# Patient Record
Sex: MALE | Race: WHITE
[De-identification: names, ages, dates, MRNs, and addresses within clinical notes are randomized per-mention and may not be internally consistent; named-entity substitution may affect disease eponyms.]

---

## 2017-01-03 ENCOUNTER — HOSPITAL ENCOUNTER (INPATIENT)
Dept: HOSPITAL 62 - ER | Age: 47
LOS: 1 days | Discharge: ANOTHER HOSPITAL | DRG: 536 | End: 2017-01-04
Attending: INTERNAL MEDICINE | Admitting: INTERNAL MEDICINE
Payer: MEDICAID

## 2017-01-03 DIAGNOSIS — F32.9: ICD-10-CM

## 2017-01-03 DIAGNOSIS — Z87.891: ICD-10-CM

## 2017-01-03 DIAGNOSIS — F41.9: ICD-10-CM

## 2017-01-03 DIAGNOSIS — N39.0: ICD-10-CM

## 2017-01-03 DIAGNOSIS — R20.0: ICD-10-CM

## 2017-01-03 DIAGNOSIS — I12.9: ICD-10-CM

## 2017-01-03 DIAGNOSIS — I25.2: ICD-10-CM

## 2017-01-03 DIAGNOSIS — M19.90: ICD-10-CM

## 2017-01-03 DIAGNOSIS — Z82.49: ICD-10-CM

## 2017-01-03 DIAGNOSIS — N18.2: ICD-10-CM

## 2017-01-03 DIAGNOSIS — N40.0: ICD-10-CM

## 2017-01-03 DIAGNOSIS — J44.9: ICD-10-CM

## 2017-01-03 DIAGNOSIS — Q05.9: ICD-10-CM

## 2017-01-03 DIAGNOSIS — Z83.6: ICD-10-CM
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
## 2017-01-03 DIAGNOSIS — I65.29: ICD-10-CM

## 2017-01-03 DIAGNOSIS — N31.9: ICD-10-CM

## 2017-01-03 DIAGNOSIS — Z86.73: ICD-10-CM

## 2017-01-03 DIAGNOSIS — D63.1: ICD-10-CM

## 2017-01-03 DIAGNOSIS — K21.9: ICD-10-CM

## 2017-01-03 DIAGNOSIS — F98.8: ICD-10-CM

## 2017-01-03 DIAGNOSIS — Z79.899: ICD-10-CM

## 2017-01-03 DIAGNOSIS — Z79.4: ICD-10-CM

## 2017-01-03 DIAGNOSIS — K59.00: ICD-10-CM

## 2017-01-03 DIAGNOSIS — Z91.030: ICD-10-CM

## 2017-01-03 DIAGNOSIS — Z83.3: ICD-10-CM

## 2017-01-03 DIAGNOSIS — Z96.641: ICD-10-CM

## 2017-01-03 DIAGNOSIS — S72.142A: Primary | ICD-10-CM

## 2017-01-03 DIAGNOSIS — F60.9: ICD-10-CM

## 2017-01-03 DIAGNOSIS — E87.1: ICD-10-CM

## 2017-01-03 DIAGNOSIS — E13.22: ICD-10-CM

## 2017-01-03 DIAGNOSIS — G47.33: ICD-10-CM

## 2017-01-03 DIAGNOSIS — G43.909: ICD-10-CM

## 2017-01-03 DIAGNOSIS — Z80.9: ICD-10-CM

## 2017-01-03 DIAGNOSIS — I25.10: ICD-10-CM

## 2017-01-03 LAB
BUN SERPL-MCNC: 18 MG/DL (ref 7–20)
CALCIUM: 9.8 MG/DL (ref 8.4–10.2)
CHLORIDE SERPL-SCNC: 91 MMOL/L (ref 98–107)
CREAT SERPL-MCNC: 1.17 MG/DL (ref 0.52–1.25)
ERYTHROCYTE [DISTWIDTH] IN BLOOD BY AUTOMATED COUNT: 17.4 % (ref 11.5–14)
ETHANOL SERPL-MCNC: < 10 MG/DL
GLUCOSE SERPL-MCNC: 143 MG/DL (ref 75–110)
HCT VFR BLD CALC: 37.8 % (ref 37.9–51)
HGB BLD-MCNC: 12.6 G/DL (ref 13.5–17)
HGB HCT DIFFERENCE: 0
MCH RBC QN AUTO: 28 PG (ref 27–33.4)
MCHC RBC AUTO-ENTMCNC: 33.4 G/DL (ref 32–36)
MCV RBC AUTO: 84 FL (ref 80–97)
PROTHROMBIN TIME: 12.6 SEC (ref 11.4–15.4)
RBC # BLD AUTO: 4.49 10^6/UL (ref 4.35–5.55)
WBC # BLD AUTO: 20.6 10^3/UL (ref 4–10.5)

## 2017-01-03 PROCEDURE — 80307 DRUG TEST PRSMV CHEM ANLYZR: CPT

## 2017-01-03 PROCEDURE — 93010 ELECTROCARDIOGRAM REPORT: CPT

## 2017-01-03 PROCEDURE — 85025 COMPLETE CBC W/AUTO DIFF WBC: CPT

## 2017-01-03 PROCEDURE — 85610 PROTHROMBIN TIME: CPT

## 2017-01-03 PROCEDURE — 71010: CPT

## 2017-01-03 PROCEDURE — 87186 SC STD MICRODIL/AGAR DIL: CPT

## 2017-01-03 PROCEDURE — 81001 URINALYSIS AUTO W/SCOPE: CPT

## 2017-01-03 PROCEDURE — 87088 URINE BACTERIA CULTURE: CPT

## 2017-01-03 PROCEDURE — 82962 GLUCOSE BLOOD TEST: CPT

## 2017-01-03 PROCEDURE — 80048 BASIC METABOLIC PNL TOTAL CA: CPT

## 2017-01-03 PROCEDURE — 36415 COLL VENOUS BLD VENIPUNCTURE: CPT

## 2017-01-03 PROCEDURE — 93005 ELECTROCARDIOGRAM TRACING: CPT

## 2017-01-03 PROCEDURE — 96374 THER/PROPH/DIAG INJ IV PUSH: CPT

## 2017-01-03 PROCEDURE — 99285 EMERGENCY DEPT VISIT HI MDM: CPT

## 2017-01-03 PROCEDURE — 87086 URINE CULTURE/COLONY COUNT: CPT

## 2017-01-03 NOTE — ER DOCUMENT REPORT
ED Hip Pain/Injury





<RINA ACE - Last Filed: 01/04/17 02:59>





- General


Mode of Arrival: Medic


Information source: Patient, Relative


TRAVEL OUTSIDE OF THE [place] IN LAST 30 DAYS: No





- HPI


Patient complains to provider of: Injury, Hip


Associated Symptoms: Other - See above





<PRICEENRIQUE - Last Filed: 01/04/17 05:53>





- General


Chief Complaint: Hip Injury


Stated Complaint: FALL LEFT HIP AND LEG PAIN


Notes: 


Patient is a 46 year old male who presents to the emergency department 

complaining of left hip pain onset around 1700. Patient reports he was knocked 

down by his neighbor's large dog and fell over the dog injuring his hip. 

Patient reports he had a total right hip replacement about 2 years ago at [facility] after a fall due to having "fragile bones". Patient is currently taking 

Aggrenox at home.  (ENRIQUE AVILA)





- Related Data


Allergies/Adverse Reactions: 


 





bee pollen [Bee Pollen] Allergy (Verified 08/26/16 11:02)


 


No Known Drug Allergies Allergy (Verified 08/26/16 11:02)


 











Past Medical History





- General


Information source: Patient





- Social History


Smoking Status: Unknown if Ever Smoked


Family History: Reviewed & Not Pertinent, COPD, DM


Patient has suicidal ideation: No


Patient has homicidal ideation: No





- Past Medical History


Cardiac Medical History: Reports: Hx Coronary Artery Disease, Hx Heart Attack, 

Hx Hypertension


Pulmonary Medical History: Reports: Hx Bronchitis, Hx COPD, Hx Pneumonia


Neurological Medical History: Reports: Hx Cerebrovascular Accident, Hx Migraine

, Hx Seizures


Endocrine Medical History: Reports: Hx Diabetes Mellitus Type 1


Renal/ Medical History: Reports: Hx Benign Prostatic Hyperplasia, Hx Kidney 

Stones


GI Medical History: Reports: Hx Gastroesophageal Reflux Disease, Hx Ulcer


Musculoskeltal Medical History: Reports Hx Arthritis


Psychiatric Medical History: Reports: Hx Anxiety - YES,CONTROLLED, Hx Attention 

Deficit Hyperactivity Disorder, Hx Depression


Past Surgical History: Reports: Hx Open Heart Surgery - correction of heart 

murmur, Hx Orthopedic Surgery - partial left hip replacement, Hx Tonsillectomy





- Immunizations


Hx Diphtheria, Pertussis, Tetanus Vaccination: No - unk


Hx Pneumococcal Vaccination: 01/01/15





<ENRIQUE AVILA - Last Filed: 01/04/17 05:53>





Review of Systems





- Review of Systems


Constitutional: No symptoms reported


EENT: No symptoms reported


Cardiovascular: No symptoms reported


Respiratory: No symptoms reported


Gastrointestinal: No symptoms reported


Genitourinary: No symptoms reported


Male Genitourinary: No symptoms reported


Musculoskeletal: See HPI, Joint pain - left hip


Skin: No symptoms reported


Hematologic/Lymphatic: No symptoms reported


Neurological/Psychological: No symptoms reported


-: Yes All other systems reviewed and negative





<ENRIQUE AVILA - Last Filed: 01/04/17 05:53>





Physical Exam





- Vital signs


Interpretation: Normal





- General


General appearance: Appears well, Alert





- HEENT


Head: Normocephalic, Atraumatic





- Respiratory


Respiratory status: No respiratory distress


Chest status: Nontender


Breath sounds: Normal


Chest palpation: Normal





- Cardiovascular


Rhythm: Regular


Heart sounds: Normal auscultation


Murmur: No


Pulses: Normal: Femoral, Dorsalis pedis





- Back


Back: Nontender - No tenderness to flank or back. No midline cervical, thorasic

, or lumbar tenderness to palpation.





- Extremities


General upper extremity: Normal inspection


General lower extremity: Other - On examination patient had left leg folded 

under right and not wanting to move it, eventually he did allow the leg to be 

straightened. Tenderness to palpation of the left hip.


Hip: Tender





- Neurological


Neuro grossly intact: Yes


Cognition: Normal


Orientation: AAOx4


Kwame Coma Scale Eye Opening: Spontaneous


Kwame Coma Scale Verbal: Oriented


Kwame Coma Scale Motor: Obeys Commands


Kwame Coma Scale Total: 15


Speech: Normal





- Psychological


Associated symptoms: Normal affect, Normal mood





- Skin


Skin Temperature: Warm


Skin Moisture: Dry


Skin Color: Normal





<ENRIQUE AVILA - Last Filed: 01/04/17 05:53>





- Vital signs


Vitals: 


 











Resp


 


 16 


 


 01/03/17 23:28








 (RINA ACE)





Course





- Laboratory


Result Diagrams: 


 01/03/17 23:09





 01/03/17 23:09





<RINA ACE - Last Filed: 01/04/17 02:59>





- Laboratory


Result Diagrams: 


 01/03/17 23:09





 01/03/17 23:09





- Consults


  ** Dr. Lord


Time consulted: 23:31 - Discussed patient's condition 





<ENRIQUE AVILA - Last Filed: 01/04/17 05:53>





- Re-evaluation


Re-evalutation: 


01/04/17 01:47


I personally performed the services described in the documentation, reviewed 

and edited the documentation which was dictated to my scribe in my presence, 

and it accurately records my words and actions.








01/04/17 02:05


Patient is adamant chief complaint left hip pain he says he tripped over a dog 

about 6 hours ago. Patient had been waiting to be seen hours before I actually 

arrived to the emergency department and within 10 minutes of seeing him he was 

given narcotic pain medication. Patient has a history of fracture on the right 

with hip replacement in [place]. He is awake alert and oriented no altered 

mental status head neck chest back or abdominal pain. He's got good perfusion 

distally with no neurological deficits intertrochanteric fracture on CT scan. 

On Aggrenox spoke with Dr. Dunlap on call for orthopedics who agreed to see the 

patient in consultation with hospitalist admission talked Dr. Paulino is going 

admit patient to the hospital.





01/04/17 02:59


 (RINA ACE)





- Vital Signs


Vital signs: 


 











Temp Pulse Resp BP Pulse Ox


 


       16       


 


       01/03/17 23:28      








 (RINA ACE)





- Laboratory


Laboratory results interpreted by me: 


 











  01/03/17 01/03/17





  23:09 23:09


 


WBC  20.6 H 


 


Hgb  12.6 L 


 


Hct  37.8 L 


 


RDW  17.4 H 


 


Seg Neuts % (Manual)  94 H 


 


Lymphocytes % (Manual)  3 L 


 


Abs Neuts (Manual)  19.4 H 


 


Sodium   127.4 L


 


Chloride   91 L


 


Carbon Dioxide   19 L


 


Glucose   143 H








 (RINA ACE)





- EKG Interpretation by Me


Additional EKG results interpreted by me: 





01/04/17 03:00


EKG interpreted by myself to reveal a sinus rhythm at 88 bpm no acute ST 

segment elevation or depression (RINA ACE)





Scribe Documentation





- Scribe


Written by Tung:: tung Clark, 1/4/17, 0025


acting as scribe for :: Jefry





<ENRIQUE AVILA - Last Filed: 01/04/17 05:53>

## 2017-01-04 VITALS — SYSTOLIC BLOOD PRESSURE: 149 MMHG | DIASTOLIC BLOOD PRESSURE: 92 MMHG

## 2017-01-04 LAB
ANION GAP SERPL CALC-SCNC: 16 MMOL/L (ref 5–19)
ANION GAP SERPL CALC-SCNC: 17 MMOL/L (ref 5–19)
ANISOCYTOSIS BLD QL SMEAR: (no result)
APPEARANCE UR: (no result)
BASOPHILS # BLD AUTO: 0.1 10^3/UL (ref 0–0.2)
BASOPHILS NFR BLD AUTO: 0.3 % (ref 0–2)
BASOPHILS NFR BLD MANUAL: 0 % (ref 0–2)
BILIRUB UR QL STRIP: NEGATIVE
BUN SERPL-MCNC: 18 MG/DL (ref 7–20)
BURR CELLS BLD QL SMEAR: SLIGHT
CALCIUM: 9.1 MG/DL (ref 8.4–10.2)
CHLORIDE SERPL-SCNC: 92 MMOL/L (ref 98–107)
CO2 SERPL-SCNC: 19 MMOL/L (ref 22–30)
CO2 SERPL-SCNC: 19 MMOL/L (ref 22–30)
CREAT SERPL-MCNC: 1.01 MG/DL (ref 0.52–1.25)
DACRYOCYTES BLD QL SMEAR: SLIGHT
EOSINOPHIL # BLD AUTO: 0 10^3/UL (ref 0–0.6)
EOSINOPHIL NFR BLD AUTO: 0.2 % (ref 0–6)
EOSINOPHIL NFR BLD MANUAL: 0 % (ref 0–6)
ERYTHROCYTE [DISTWIDTH] IN BLOOD BY AUTOMATED COUNT: 17.3 % (ref 11.5–14)
GLUCOSE SERPL-MCNC: 113 MG/DL (ref 75–110)
GLUCOSE UR STRIP-MCNC: NEGATIVE MG/DL
HCT VFR BLD CALC: 33.8 % (ref 37.9–51)
HGB BLD-MCNC: 11.4 G/DL (ref 13.5–17)
HGB HCT DIFFERENCE: 0.4
KETONES UR STRIP-MCNC: NEGATIVE MG/DL
LYMPHOCYTES # BLD AUTO: 2 10^3/UL (ref 0.5–4.7)
LYMPHOCYTES NFR BLD AUTO: 11.6 % (ref 13–45)
MCH RBC QN AUTO: 27.9 PG (ref 27–33.4)
MCHC RBC AUTO-ENTMCNC: 33.7 G/DL (ref 32–36)
MCV RBC AUTO: 83 FL (ref 80–97)
MONOCYTES # BLD AUTO: 1.6 10^3/UL (ref 0.1–1.4)
MONOCYTES NFR BLD AUTO: 9.2 % (ref 3–13)
NEUTROPHILS # BLD AUTO: 13.3 10^3/UL (ref 1.7–8.2)
NEUTS SEG NFR BLD AUTO: 78.7 % (ref 42–78)
NITRITE UR QL STRIP: NEGATIVE
OVALOCYTES BLD QL SMEAR: SLIGHT
PH UR STRIP: 7 [PH] (ref 5–9)
POIKILOCYTOSIS BLD QL SMEAR: SLIGHT
POLYCHROMASIA BLD QL SMEAR: SLIGHT
POTASSIUM SERPL-SCNC: 3.6 MMOL/L (ref 3.6–5)
POTASSIUM SERPL-SCNC: 4.4 MMOL/L (ref 3.6–5)
PROT UR STRIP-MCNC: NEGATIVE MG/DL
RBC # BLD AUTO: 4.08 10^6/UL (ref 4.35–5.55)
SCHISTOCYTES BLD QL SMEAR: SLIGHT
SODIUM SERPL-SCNC: 127.2 MMOL/L (ref 137–145)
SODIUM SERPL-SCNC: 127.4 MMOL/L (ref 137–145)
SP GR UR STRIP: 1.01
TOTAL CELLS COUNTED BLD: 100
TOXIC GRANULES BLD QL SMEAR: (no result)
UROBILINOGEN UR-MCNC: NEGATIVE MG/DL (ref ?–2)
VARIANT LYMPHS NFR BLD MANUAL: 3 % (ref 13–45)
WBC # BLD AUTO: 16.9 10^3/UL (ref 4–10.5)

## 2017-01-04 PROCEDURE — 3E0F73Z INTRODUCTION OF ANTI-INFLAMMATORY INTO RESPIRATORY TRACT, VIA NATURAL OR ARTIFICIAL OPENING: ICD-10-PCS | Performed by: INTERNAL MEDICINE

## 2017-01-04 PROCEDURE — 5A09357 ASSISTANCE WITH RESPIRATORY VENTILATION, LESS THAN 24 CONSECUTIVE HOURS, CONTINUOUS POSITIVE AIRWAY PRESSURE: ICD-10-PCS | Performed by: INTERNAL MEDICINE

## 2017-01-04 RX ADMIN — HEPARIN SODIUM SCH UNIT: 5000 INJECTION, SOLUTION INTRAVENOUS; SUBCUTANEOUS at 14:18

## 2017-01-04 RX ADMIN — IPRATROPIUM BROMIDE AND ALBUTEROL SULFATE SCH ML: 2.5; .5 SOLUTION RESPIRATORY (INHALATION) at 14:30

## 2017-01-04 RX ADMIN — KETOROLAC TROMETHAMINE PRN MG: 30 INJECTION, SOLUTION INTRAMUSCULAR at 06:23

## 2017-01-04 RX ADMIN — KETOROLAC TROMETHAMINE PRN MG: 30 INJECTION, SOLUTION INTRAMUSCULAR at 11:29

## 2017-01-04 RX ADMIN — HEPARIN SODIUM SCH UNIT: 5000 INJECTION, SOLUTION INTRAVENOUS; SUBCUTANEOUS at 06:00

## 2017-01-04 RX ADMIN — FENTANYL CITRATE PRN MCG: 50 INJECTION INTRAMUSCULAR; INTRAVENOUS at 08:21

## 2017-01-04 RX ADMIN — IPRATROPIUM BROMIDE AND ALBUTEROL SULFATE SCH ML: 2.5; .5 SOLUTION RESPIRATORY (INHALATION) at 07:53

## 2017-01-04 RX ADMIN — FENTANYL CITRATE PRN MCG: 50 INJECTION INTRAMUSCULAR; INTRAVENOUS at 12:56

## 2017-01-04 RX ADMIN — IPRATROPIUM BROMIDE AND ALBUTEROL SULFATE SCH ML: 2.5; .5 SOLUTION RESPIRATORY (INHALATION) at 20:43

## 2017-01-04 NOTE — CONSULTATION REPORT E
Consultation Report



NAME: TARA SCOTT

MRN:  [id number]               : 1970      AGE: 46Y

DATE: 2017               ROOM:  ED02  A



TO:   RITIKA GUIDO M.D.



FROM: AMARA ROQUE M.D.

      Requesting Physician



REASON FOR CONSULTATION:

Preoperative cardiac risk assessment for a patient to undergo left femoral

fracture surgery.



HISTORY OF PRESENT ILLNESS:

The patient is a 46-year-old  male with known history of

hypertension, diabetes mellitus (type 2, insulin dependent), history of

CVA in the past and history of COPD, who now smokes electronic cigarettes

and history of anxiety, ADD, and depression, and history of noncardiac

chest pain, who was admitted after accidental fall, tripping over his pet

dog and complaining of pain in the left hip, especially when weight

bearing.  He was found to have a femoral neck fracture on the left side

and is for surgical treatment for this.  The patient recently denied any

chest pain or discomfort.  There is no shortness of breath.  There is no

cough or sputum production.  There is no fever, chills, or rigors.  There

is no PND, orthopnea, or leg edema.  No recent chest pains, which from the

history, sounds noncardiac.



PAST MEDICAL HISTORY:

Past medical history is positive for:

1.   Hypertension.

2.   He states that he was told that he had an MI in  in [place].  At

that time, he was told that it was due to stress, but there were no

symptoms of heart failure.  Hence, doubt Takotsubo cardiomyopathy.

3.   He states later in , he had a stress test and was told that

everything was normal and there was no reversible ischemia or MI.

4.  He also has a history of chronic obstructive pulmonary disease and a

history of obstructive sleep apnea.

5.   The patient used to be a former smoker and now smokes electronic

cigarettes.

6.   He has a history of diabetes mellitus type 2, insulin dependent.

7.   There is no history of chronic kidney disease.

8.   The patient has had cerebrovascular accidents in the past and

transient ischemic attacks with left-sided weakness, from which he has

fully recovered, but he had numbness from his left fingers up to his left

elbow, but there is no decreasing strength.

9.   He has a history of spina bifida, for which he has had surgery.  He

states that he has to have more surgery on his spinal cord to be put back

into its usual space.  The patient is able to walk without any problems.

10.  He also has a history of anxiety, attention deficit disorder, and

depression, which are well controlled with his current medication.

11.  He also has a history of syncope for several years.  The patient

states that he is unconscious for 20-25 minutes and when he wakes up, he

usually is confused.

12.  He also has a history of migraines.  No headache since the past month

or so.



PAST SURGICAL HISTORY:

Past surgical history is positive for:

1.   Right hip fracture surgery.

2.   He has had spina bifida surgery.

3.   He also states that he had a congenital valve repair.  The details of

which are not available.

4.   He also states that in the past, he has had a partial left hip

replacement.



FAMILY HISTORY:

Positive for coronary artery disease, MI, and diabetes mellitus and cancer

of unknown origin.



ALLERGIES:

He is allergic to:

1.  BEES.

2.  POLLEN.

3.  No known drug allergies.



SOCIAL HISTORY:

The patient is smoking electronic cigarettes.  There is no ETOH abuse.



MEDICATIONS:

1.   He is on acetaminophen 650 mg p.o. q. six hours p.r.n.

2.   He is on albuterol sulfate 1 puff inhalation t.i.d. p.r.n.

3.   He is on amitriptyline 50 mg p.o. every bedtime.

4.   He is on aspirin/dipyridamole 1 capsule p.o. q. 12 hours.

5.   He is on Benztropine mesylate 0.5 mg p.o. q. 12 hours.

6.   He is on Dulcolax 20 mg p.o. x1 and 10 mg per rectum daily p.r.n.

7.   He is on glucose 40% gel 15 grams in 30 grams p.o. respectively

p.r.n. hypoglycemia.

8.   He is also on hypoglycemia precautions with dextrose 50%, 12.5 grams

in 25 grams IV p.r.n.

9.   He also is on Colace 100 mg p.o. b.i.d.

10.  He is on Fentanyl 100 mcg IV x1 and fentanyl 50 mcg IV q. four hours

p.r.n.

11.  He is on Prozac 20 mg p.o. b.i.d.

12.  He is on Flovent HFA (fluticasone propionate) 110 mcg two sprays

nasally daily.

13.  He is on Glucagon 1 mg IM p.r.n. hypoglycemia.

14.  He is on glyburide 5 mg p.o. daily.

15.  He is on heparin 5000 units subcutaneously q. eight hours for DVT

prevention.

16.  He is on hydralazine 10 mg IV q. six hours p.r.n.

17.  He is on Lantus 20 units subcutaneously daily.

18.  He is on Accu-Cheks before meals and at bedtime with sliding-scale

regular insulin coverage.

19.  He is on albuterol/ipratropium 3 mL nebulizer treatment q. six hours

while awake.

20.  He is on Toradol (ketorolac) 15 mg IV q. six hours p.r.n.

21.  Lactobacillus acidophils 250 mg p.o. daily.

22.  He is on Cephulac 20 grams p.o. x1.

23.  He is on magnesium hydroxide 400 mg p.o. b.i.d.

24.  He is on metformin 1000 mg p.o. now and 1000 mg p.o. b.i.d. before

meals and at bedtime.

25.  He is on metoprolol 50 mg p.o. q. 12 hours.

26.  He is on Zyprexa 10 mg p.o. x1 and 5 mg p.o. daily.

27.  He is on Zyprexa 10 mg p.o. every bedtime.

28.  He is on Zofran 4 mg IV q. eight hours p.r.n.

29.  He is on oxycodone 10 mg p.o. q. six hours p.r.n.

30.  He is on MiraLax 17 grams p.o. daily.

31.  He is on sennosides-docusate *------* every bedtime.

32.  He is on Zocor (simvastatin) 40 mg p.o. every p.m.

33.  He is on Topamax 200 mg p.o. q. 12 hours.



REVIEW OF SYSTEMS:

CONSTITUTIONAL:  He denies any fevers, chills, or rigors.  Complains of

generalized fatigue and weakness.



HEAD:  History of headaches and migraines, none since more than a month. 

He has no dizziness.



EYES:  No history of amblyopia or diplopia.  No history of amaurosis

fugax.



EARS:  He is hard of hearing in the left ear, but no recurrent ear

infections.  No tinnitus.  No vertigo.



NOSE:  History of nasal allergies.  The patient uses Flovent nasal spray. 

There is no history of nosebleeds.  No history of nasal polyps.



MOUTH:  No history of altered taste sensation.  No history of ulcers in

the mouth.  No history of bleeding from the gums.



THROAT:  No history of odynophagia or dysphagia.  No history of recurrent

sore throats.



SKIN:  No history of petechiae or ecchymosis.  No history of skin cancer. 

No history of psoriasis.  No pruritus.  No yellowish discoloration of the

skin.



NECK:  No neck pain.  No swelling in the neck.  No goiter.



LUNGS:  No recent wheezing, cough, or sputum production.  No symptoms

suggestive of pneumonia.  Past history of pneumonia present.  He has a

history of COPD.  At present, the patient smokes electronic cigarettes. 

There is no history of asthma.  There is no wheezing.  He has a history of

sleep apnea.  He uses CPAP.



CARDIAC:  History of hypertension present, well controlled.  As mentioned

earlier, he states he had an MI in  and was told it was due to stress.

Most likely, he had type 2 myocardial infarction/non-ST-elevation MI with

a troponin-I leak secondary to supply-demand mismatch since the patient

did not have catheterization or a stress test at that time.  In , the

patient states he had a stress test in [place] and this was negative for

MI or ischemia, as per the patient.  He has a history of noncardiac chest

pain with chest pains usually at rest in the front of the chest, lasting

for about 15-20 minutes and he states when he has his chest pain, the

chest wall is tender to touch.  This is not precipitated by exertion and

exertion does not increase the chest pain.  He also has a history of

syncope, but the patient states that he is unconscious for about 20

minutes or so, and hence, it is unlikely that this is cardiac, although he

does have palpitations.  He has no history of PND, orthopnea, or

congestive heart failure.  History of congenital valve repair.  The

details of which are not known.



ENDOCRINE:  History of diabetes mellitus type 2, insulin dependent.  No

history of polydipsia or polyuria.  No history of heat or cold

intolerance.  No history of thyroid disorder.



MUSCULOSKELETAL:  History of arthritis present.  History of chronic back

pain.  The patient is on pain medications.



GASTROINTESTINAL:  History of constipation present.  No history of fatty

food intolerance.  No history of GI bleed.  No history of jaundice.  No

history of hepatitis.  No altered bowel movements except for constipation.

No diarrhea.  No GI bleed.  No history of Giardia.



RENAL:  Past history of renal stones.  Past history of renal

insufficiency.  At present, the patient's GFR is normal.  No symptoms of

UTI.  No history of hematuria, pyuria, or dysuria.  The patient does have

a history of neurogenic bladder.



CENTRAL NERVOUS SYSTEM:  History of spina bifida.  History of several

CVA's, fully recovered, and history of several TIA's.  The patient is

chronically on Aggrenox.  The patient has not had any TIA or CVA symptoms

since .  History of migraines.  History of seizures present, none

recently.  No history of gate imbalance.



PSYCHIATRIC:  History of attention-deficit disorder.  History of anxiety

and depression.  No suicidal ideation.



VASCULAR:  No history of cough or *------* claudication.  No history of

DVT.



HEMATOLOGIC:  No history of bleeding diathesis.  No history of clotting

disorders.



PHYSICAL EXAMINATION:

GENERAL:  On examination, the patient is well built and well nourished at

present, in no acute distress.  He says that the left hip fracture pain is

controlled with medication.



VITAL SIGNS:  He is afebrile with a temperature of 97.4 degrees Fahrenheit

orally. his pulse is 77 beats per minute, blood pressure is  126/83,

respirations are 19 per minute, O2 sats are 100% on room air.



HEAD:  Atraumatic, normocephalic.



EYES:  Pupils are equal, round, regular, and reactive to light and

accommodation.  Extraocular movements are normal.  There is no

conjunctival pallor.  There is no sclerae icterus.



EARS:  Tympanic membranes are intact.  External auditory canals are clear.



NOSE:  There is no deviated nasal septum.  There is no inflammation of the

nasal mucous membranes.



MOUTH:  Mucous membranes of the mouth are moist, tongue is moist.  There

are no ulcers.  There is no bleeding from the gums.



THROAT:  There is no redness of the oropharynx.  There are no exudates.



SKIN:  There are no skin lesions.  There are no petechiae or ecchymosis. 

There are no skin rashes.



NECK:  Supple.  There is no JVD.  Carotids are equal.  There is no bruit. 

There is no goiter.  There is no lymphadenopathy.  Trachea is central.



LUNGS:  Diminished air entry, prolonged expiration without any rhonchi,

rales, or wheezing on auscultation.  On percussion, there is

hyperresonance throughout.



HEART:  S1, S2 heard.  There is no S4 gallop.  There is no S3 gallop. 

There is a systolic murmur, left sternal border and the apex.  There is no

rub.



ABDOMEN:  Soft and nontender.  There is no hepatosplenomegaly.  Bowel

sounds are well heard.  There are no tender areas or masses.



EXTREMITIES:  Femorals are diminished.  There is foreshortening and

external rotation of the left lower extremity.  Leg pulses are slightly

diminished, but there is good capillary refill.  Sensations are normal in

the lower extremities.  There is no pedal edema.  There is no cyanosis or

clubbing.



CENTRAL NERVOUS SYSTEM:  The patient is conscious, awake, alert, and

oriented x3 with no focal deficits.  He has numbness in the left forearm

and the forearm numbness is up to the left elbow.



PSYCHIATRIC:  At present, the patient does not appear to be anxious or

depressed.  His judgment and insight seem to be intact.  He is not

agitated.  His affect seems to be normal.



DIAGNOSTIC DATA:

His chest x-ray is negative.



His EKG is sinus rhythm, within normal limits.



The patient's hip x-ray shows a intertrochanteric fracture of the proximal

left femur.  There is right total hip replacement in the past.



Labs:  His white count is 16,900; hemoglobin is 11.4; hematocrit is 33.8

and his platelet count is 187,000.  The patient's pro time is 12.6, INR is

0.92.  The patient's sodium is 127.2, potassium is 3.6, chloride is 92,

CO2 is low at 19, his BUN is 18, creatinine is 1.01, GFR is greater than

60, his glucose is 113, his calcium is 9.1.  The patient's serum alcohol

is less than 10.



IMPRESSION:

1.   Accidental fall by tripping over his pet dog with resulting

intertrochanteric fracture of the left proximal femur.

2.   History of noncardiac chest pain, most likely musculoskeletal.

3.   Hypertension, well controlled.

4.   Diabetes mellitus type 2, insulin dependent.

5.   COPD.

6.   History of CVA's, TIA's, and numbness, no recurrence since many years

and the patient is on Aggrenox.

7.   Anxiety.

8.   History of congenital valve repair.  Etiology not available.

9.   History of syncope.  Etiology?  The patient states that he is

unconscious for about 25-30 minutes, which makes it unlikely that this is

cardiogenic in origin.  Most likely, it is a seizure versus secondary to

CNS causes.  Doubt cardiac etiology of this.

10.  History of spinal bifida.

11.  History of seizure disorder.

12.  Attention deficit disorder.

13.  Depression.

14.  History of migraines.

15.  Obstructive sleep apnea, on CPAP.

16.  History of past renal insufficiency.  At present, GFR normal.



RECOMMENDATIONS:

Continue the patient on his current medications including metoprolol and

would recommend continuing the patient's Aggrenox.  Continue his

respiratory treatments and p.r.n. treatment for hypoglycemia.  Continue

his glyburide.  Watch his blood sugars.



The patient would be an acceptable cardiac risk for this procedure under

anesthesia or spinal anesthesia.  This has been discussed with the

patient.  I discussed the possibility of having an MI, stroke, congestive

heart failure, or arrhythmia, although these complications would be on the

lower range list.  Continue his beta blocker, metoprolol 50 mg p.o. q. 12

hours.  Perioperatively, we will monitor the patient's heart rhythm and

place the patient postop on a telemetry monitoring situation and get

serial EKG's and enzymes.  Will follow with you.



TIME SPENT:

Note, 45 minutes were spent on this patient with more than 50% of the time

spent in direct patient care, also discussions with the patient and with

the attending physician on the case, which is the hospitalist.



CODE STATUS:

Note, this patient is a FULL CODE.  He states that his fiance is his

surrogate healthcare decision maker.









DICTATING PHYSICIAN: RITIKA GUIDO M.D.





[id number]                  DT: 2017    [id number]

PHY#: [id number]            DD: 2017    1313

ID:   [id number]           JOB#: [id number]      ACCT: [id number]



cc:RITIKA GUIDO M.D.

>

## 2017-01-04 NOTE — PDOC H&P
History of Present Illness


Admission Date/PCP: 


  01/04/17 01:09





  DANIELLE ALEJO, 





Patient complains of: Left hip pain


History of Present Illness: 


TARA SCOTT is a 46 year old male with a past medical history of spina 

bifida neurogenic bladder with recurrent urinary tract infection migraine 

headaches diabetes remote congenital heart valve repair and CVA on chronic 

Aggrenox.  Who had been in his usual state of health until becoming entangled 

with PET dog resulting in a fall to his left side and pain with weightbearing.  

He denies loss of consciousness or dizziness and states he is otherwise well.  

He is at home for several hours and unable to weight bear on the left prompting 

him to seek evaluation emergency room where his found to have an 

intertrochanteric femoral fracture on the left side with shortening and 

external rotation of the left leg and he is referred to the hospitalist for 

admission.  Patient denies any new medications. 








Past Medical History


Cardiac Medical History: Reports: Coronary Artery Disease, Myocardial Infarction

, Hypertension


Pulmonary Medical History: Reports: Bronchitis, Chronic Obstructive Pulmonary 

Disease (COPD), Pneumonia


Neurological Medical History: Reports: Ischemic CVA, Migraine, Seizures


Endocrine Medical History: Reports: Diabetes Mellitus Type 1


GI Medical History: Reports: Gastroesophageal Reflux Disease


Musculoskeltal Medical History: Reports: Arthritis


Psychiatric Medical History: Reports: Attention Deficit Hyperactivity Disorder, 

Depression, Personality Disorder, Tobacco Dependency


Hematology: 


   Denies: Anemia





Past Surgical History


Past Surgical History: Reports: Orthopedic Surgery - partial left hip 

replacement, Tonsillectomy





Social History


Information Source: Patient, Relative


Lives with: Family


Smoking Status: Current Every Day Smoker


Cigarettes Packs Per Day: 1


Frequency of Alcohol Use: Rare


Hx Recreational Drug Use: Yes


Hx Prescription Drug Abuse: No





- Advance Directive


Resuscitation Status: Full Code





Family History


Family History: Reviewed & Not Pertinent, COPD, DM


Parental Family History Reviewed: Yes


Children Family History Reviewed: Yes


Sibling(s) Family History Reviewed.: Yes





Medication/Allergy


Home Medications: 








Aspirin/Dipyridamole [Aggrenox 25 mg/200 mg Capsule SA] 1 cap.sr PO Q12 10/11/

13 


Esomeprazole Magnesium [Nexium] 40 mg PO DAILY 08/26/16 


Glyburide 5 mg PO DAILY 08/26/16 


Potassium Chloride 10 meq PO DAILY 08/26/16 


Amiloride/Hydrochlorothiazide [Amiloride HCl-Hctz 5-50 mg Tab] 1 tab PO DAILY 09 /18/16 


Simvastatin 40 mg PO QPM 09/18/16 


Acetaminophen [Tylenol 325 mg Tablet] 650 mg PO Q4HP PRN #0 tablet 09/26/16 


Fluoxetine HCl [Prozac 20 mg Capsule] 20 mg PO QPM #0 capsule 09/26/16 


Fluoxetine HCl [Prozac 20 mg Capsule] 40 mg PO DAILY #0 capsule 09/26/16 


Insulin Glargine,Hum.rec.anlog [Lantus Insulin 100 Unit/mL] 40 unit SUBCUT 

DAILY #0 insuln.pen 09/26/16 


Nicotine [Nicoderm 21 mg/24 Hr Transderm Patch] 1 each TD DAILY #0 patch.td24 09 /26/16 


Tiotropium Bromide [Spiriva Handihaler 5 Cap/Kit (18 Mcg/Cap)] 1 cap IH DAILY #

0 kit 09/26/16 


Albuterol Sulfate [Proair Respiclick] 90 mcg IH TIDP PRN 10/28/16 


Amitriptyline HCl [Elavil 50 mg Tablet] 50 mg PO QHS 10/28/16 


Aspirin/Dipyridamole [Aggrenox 25 mg-200 mg Capsule] 1 each PO BID 10/28/16 


Benztropine Mesylate [Benztropine Mesylate 0.5 mg Tablet] 0.5 mg PO BID 10/28/

16 


Ergocalciferol (Vitamin D2) [Vitamin D] 1.25 mg PO SA@1000 10/28/16 


Magnesium Oxide [Mag-Ox 400 mg Tablet] 400 mg PO BID 10/28/16 


Metoprolol Tartrate [Lopressor 50 mg Tablet] 50 mg PO BID 10/28/16 


Olanzapine [Zyprexa] 5 mg PO DAILY 10/28/16 


Olanzapine [Zyprexa] 15 mg PO QPM 10/28/16 


Acidoph/L.bulg/Bif.b/S.thermop [Bacid Caplet] 1 tab PO DAILY #30 tablet 10/29/

16 


Fluoxetine HCl [Prozac 20 mg Capsule] 40 mg PO DAILY  capsule 10/29/16 


Fluticasone Propionate [Flonase Nasal Spray 50 Mcg/Spray 16 gm] 2 spray NASL 

DAILY  spray.pump 10/29/16 


Levofloxacin [Levaquin 750 mg Tablet] 750 mg PO DAILY #21 tab 10/29/16 


Metoprolol Tartrate [Lopressor 50 mg Tablet] 50 mg PO Q12  tablet 10/29/16 


Olanzapine [Zyprexa 5 mg Tablet] 10 mg PO Q12  tablet 10/29/16 


Oxycodone HCl [Oxy-Ir 5 mg Tablet] 5 mg PO Q6HP PRN #10 tablet 10/29/16 








Allergies/Adverse Reactions: 


 





bee pollen [Bee Pollen] Allergy (Verified 08/26/16 11:02)


 


No Known Drug Allergies Allergy (Verified 08/26/16 11:02)


 











Review of Systems


Constitutional: ABSENT: chills, fever(s), headache(s), weight gain, weight loss


Eyes: ABSENT: visual disturbances


Ears: ABSENT: hearing changes


Cardiovascular: ABSENT: chest pain, dyspnea on exertion, edema, orthropnea, 

palpitations


Respiratory: ABSENT: cough, hemoptysis


Gastrointestinal: ABSENT: abdominal pain, constipation, diarrhea, hematemesis, 

hematochezia, nausea, vomiting


Genitourinary: ABSENT: dysuria, hematuria


Musculoskeletal: ABSENT: joint swelling


Integumentary: ABSENT: rash, wounds


Neurological: ABSENT: abnormal gait, abnormal speech, confusion, dizziness, 

focal weakness, syncope


Psychiatric: ABSENT: anxiety, depression, homidical ideation, suicidal ideation


Endocrine: ABSENT: cold intolerance, heat intolerance, polydipsia, polyuria


Hematologic/Lymphatic: ABSENT: easy bleeding, easy bruising





Physical Exam


Vital Signs: 


 











Temp Pulse Resp BP Pulse Ox


 


       16       


 


       01/03/17 23:28      











General appearance: PRESENT: cooperative


Head exam: PRESENT: atraumatic, normocephalic


Ear exam: PRESENT: normal external ear exam


Mouth exam: PRESENT: moist, tongue midline


Neck exam: ABSENT: carotid bruit, JVD, lymphadenopathy, thyromegaly


Respiratory exam: PRESENT: clear to auscultation david.  ABSENT: rales, rhonchi, 

wheezes


Cardiovascular exam: PRESENT: RRR.  ABSENT: diastolic murmur, rubs, systolic 

murmur


Pulses: PRESENT: normal dorsalis pedis pul


Vascular exam: PRESENT: normal capillary refill


GI/Abdominal exam: PRESENT: diminished bowel sounds, distended, hypoactive 

bowel sounds, normal bowel sounds, soft.  ABSENT: guarding, mass, organolmegaly

, rebound, tenderness


Rectal exam: PRESENT: deferred


Extremities exam: PRESENT: other - Left leg short and externally rotated


Neurological exam: PRESENT: alert, awake, oriented to person, oriented to place

, oriented to time, oriented to situation, CN II-XII grossly intact.  ABSENT: 

motor sensory deficit


Psychiatric exam: PRESENT: unusual affect - At baseline


Skin exam: PRESENT: dry, intact, warm.  ABSENT: cyanosis, rash





Results


Laboratory Results: 


 











  01/04/17





  01:47


 


Urine Color  YELLOW


 


Urine Appearance  CLOUDY


 


Urine pH  7.0


 


Ur Specific Gravity  1.015


 


Urine Protein  NEGATIVE


 


Urine Glucose (UA)  NEGATIVE


 


Urine Ketones  NEGATIVE


 


Urine Blood  SMALL H


 


Urine Nitrite  NEGATIVE


 


Ur Leukocyte Esterase  LARGE H


 


Urine WBC (Auto)  58


 


Urine RBC (Auto)  15











Impressions: 


 





Hip X-Ray  01/03/17 00:00


IMPRESSION:  Intertrochanteric fracture of the proximal left femur.  Other 

findings as noted above.


 








Chest X-Ray  01/03/17 23:29


IMPRESSION:  NO ACUTE RADIOGRAPHIC FINDING IN THE CHEST.


 














Assessment & Plan





- Diagnosis


(1) Closed left hip fracture





Is this a current diagnosis for this admission?: YesPlan: 


Mechanical fall symptomatic management with orthopedic surgery consultation.  

Though the patient does have COPD and coronary artery disease he is functional 

able to perform 5 mets without difficulty and has no immediate reversible risk 

factors justifying delay of orthopedic surgery.  EKG and chest x-ray are 

unremarkable recommending continuation of albuterol Atrovent with incentive 

spirometry.  








(2) COPD (chronic obstructive pulmonary disease)





Is this a current diagnosis for this admission?: YesPlan: 


Incentive spirometry, albuterol Atrovent, nicotine replacement








(3) Chronic kidney disease, stage 2 (mild)


Is this a current diagnosis for this admission?: YesPlan: 


Avoid nephrotoxic meds and doses reevaluate chemistry








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


Is this a current diagnosis for this admission?: YesPlan: 


Reduce long-acting Lantus insulin to half continue sliding scale insulin every 

6 hours








(5) Hypertension


Qualifiers: 


     Hypertension type: essential hypertension        Qualified Code(s): I10 - 

Essential (primary) hypertension  


Is this a current diagnosis for this admission?: YesPlan: 


Home regiment and when necessary IV hydralazine








(6) Tobacco abuse


Is this a current diagnosis for this admission?: YesPlan: 


Nicotine replacement ordered








(7) Constipation





Is this a current diagnosis for this admission?: YesPlan: 


Opiate dependence optimize bowel regiment of Colace lactulose and enema when 

necessary











- Time


Time Spent: 50 to 70 Minutes

## 2017-01-04 NOTE — PDOC CONSULTATION
History of Present Illness


Admission Date/PCP: 


  01/04/17 01:09





  DANIELLECICI ALEJO, 





Patient complains of: Left hip pain


History of Present Illness: 


Patient is a 46-year-old gentleman with significant comorbidities who tripped 

over his neighbor's dog and fell onto his left hip.  Injury happened last 

night.  Brought by EMS to [facility] for evaluation.  Complaining of 

deformity and pain and inability to weight-bear on the left hip.  X-rays taken 

showed the patient had a displaced intertrochanteric hip fracture.  She'll 

tingling in the lower extremity.  He does state he has spina bifida and is able 

to ambulate has some residual deficits.  He states he had his right hip 

fracture 2 years ago where he had a hemiarthroplasty done down in [place].  

His comorbidities include diabetes type I, cerebral strokes, myocardial 

infarction 2.  Has history of COPD.  No other orthopedic issues or complaints.





Past Medical History


Cardiac Medical History: Reports: Coronary Artery Disease, Myocardial Infarction

, Hypertension


Pulmonary Medical History: Reports: Bronchitis, Chronic Obstructive Pulmonary 

Disease (COPD), Pneumonia


Neurological Medical History: Reports: Ischemic CVA, Migraine, Seizures


Endocrine Medical History: Reports: Diabetes Mellitus Type 1


GI Medical History: Reports: Gastroesophageal Reflux Disease


Musculoskeltal Medical History: Reports: Arthritis


Psychiatric Medical History: Reports: Attention Deficit Hyperactivity Disorder, 

Depression, Personality Disorder, Tobacco Dependency


Hematology: 


   Denies: Anemia





Past Surgical History


Past Surgical History: Reports: Orthopedic Surgery - partial left hip 

replacement, Tonsillectomy





Social History


Lives with: Family


Smoking Status: Unknown if Ever Smoked


Cigarettes Packs Per Day: 1


Frequency of Alcohol Use: Rare


Hx Recreational Drug Use: Yes


Drugs: Cocaine


Hx Prescription Drug Abuse: No





- Advance Directive


Resuscitation Status: Full Code





Family History


Family History: Reviewed & Not Pertinent, COPD, DM


Parental Family History Reviewed: Yes


Children Family History Reviewed: Yes


Sibling(s) Family History Reviewed.: Yes





Medication/Allergy


Home Medications: 








Albuterol Sulfate [Proair HFA] 2 puff IH TIDP PRN 01/04/17 


Amiloride/Hydrochlorothiazide [Amiloride HCl-Hctz 5-50 mg Tab] 1 tab PO DAILY 01 /04/17 


Amitriptyline HCl [Elavil 50 Mg Tablet] 50 mg PO DAILY 01/04/17 


Aspirin/Dipyridamole [Aggrenox 25 mg-200 mg Capsule] 1 cap PO BID 01/04/17 


Benztropine Mesylate 1 mg PO DAILY 01/04/17 


Benztropine Mesylate 2 mg PO QHS 01/04/17 


Ergocalciferol (Vitamin D2) [Vitamin D2] 50,000 unit PO SA@1000 01/04/17 


Fluoxetine HCl [Prozac 20 mg Capsule] 20 mg PO QHS 01/04/17 


Fluoxetine HCl [Prozac] 40 mg PO DAILY 01/04/17 


Glyburide 5 mg PO DAILY 01/04/17 


Insulin Glargine,Hum.rec.anlog [Lantus Solostar] 40 unit SQ QHS 01/04/17 


Metoprolol Tartrate [Lopressor] 50 mg PO Q12 01/04/17 


Olanzapine 5 mg PO DAILY 01/04/17 


Olanzapine 15 mg PO QPM 01/04/17 


Oxycodone HCl/Acetaminophen [Oxycodone-Acetaminophen ] 1 tab PO Q6 01/04/ 17 


Potassium Chloride 10 meq PO DAILY 01/04/17 


Tiotropium Bromide [Spiriva Handihaler 18 mcg/dose (30 Dose)] 1 cap IH DAILY 01/ 04/17 


Tizanidine HCl [Zanaflex] 4 mg PO BIDP PRN 01/04/17 








Allergies/Adverse Reactions: 


 





bee pollen [Bee Pollen] Allergy (Verified 08/26/16 11:02)


 


No Known Drug Allergies Allergy (Verified 08/26/16 11:02)


 











Physical Exam


Vital Signs: 


 











Temp Pulse Resp BP Pulse Ox


 


 36.3 C   77   19   126/83 H  100 


 


 01/04/17 08:37  01/04/17 07:53  01/04/17 07:53  01/04/17 07:00  01/04/17 07:53








 Intake & Output











 01/03/17 01/04/17 01/05/17





 06:59 06:59 06:59


 


Weight   62.596 kg











General appearance: PRESENT: no acute distress


Head exam: PRESENT: atraumatic


Eye exam: PRESENT: EOMI.  ABSENT: nystagmus


Respiratory exam: PRESENT: symmetrical, unlabored.  ABSENT: accessory muscle use


Pulses: PRESENT: normal dorsalis pedis pul


Vascular exam: PRESENT: normal capillary refill


Neurological exam: PRESENT: alert, altered, awake, oriented to person, oriented 

to place, oriented to time, oriented to situation


Psychiatric exam: PRESENT: appropriate affect, normal mood


Skin exam: PRESENT: intact


Adult Front & Back Image: 


  __________________________














  __________________________





 1 - Patient has positive deformity of the left lower extremity.  It is 

externally rotated and shortened.  Tender to palpation over the groin.  Any 

attempt of straining of the calcific and pain.  Able to extend and flex his 

toes has good sensation light touch.  He has stiffness in the ankle joint.  He 

states he was able to move it before the injury.  He states he can't move it 

second to pain.  I tried to passively move and felt tightness in Achilles.  Has 

good sensation to light touch.








Results


Laboratory Results: 


 





 01/04/17 06:35 





 01/04/17 06:35 





 











  01/04/17 01/04/17 01/04/17





  01:47 06:35 06:35


 


WBC   16.9 H 


 


RBC   4.08 L 


 


Hgb   11.4 L 


 


Hct   33.8 L 


 


MCV   83 


 


MCH   27.9 


 


MCHC   33.7 


 


RDW   17.3 H 


 


Plt Count   187 


 


Seg Neutrophils %   78.7 H 


 


Lymphocytes %   11.6 L 


 


Monocytes %   9.2 


 


Eosinophils %   0.2 


 


Basophils %   0.3 


 


Absolute Neutrophils   13.3 H 


 


Absolute Lymphocytes   2.0 


 


Absolute Monocytes   1.6 H 


 


Absolute Eosinophils   0.0 


 


Absolute Basophils   0.1 


 


Sodium    127.2 L


 


Potassium    3.6


 


Chloride    92 L


 


Carbon Dioxide    19 L


 


Anion Gap    16


 


BUN    18


 


Creatinine    1.01


 


Est GFR ( Amer)    > 60


 


Est GFR (Non-Af Amer)    > 60


 


Glucose    113 H


 


Calcium    9.1


 


Urine Color  YELLOW  


 


Urine Appearance  CLOUDY  


 


Urine pH  7.0  


 


Ur Specific Gravity  1.015  


 


Urine Protein  NEGATIVE  


 


Urine Glucose (UA)  NEGATIVE  


 


Urine Ketones  NEGATIVE  


 


Urine Blood  SMALL H  


 


Urine Nitrite  NEGATIVE  


 


Ur Leukocyte Esterase  LARGE H  


 


Urine WBC (Auto)  58  


 


Urine RBC (Auto)  15  











Impressions: 


 





Hip X-Ray  01/03/17 00:00


IMPRESSION:  Intertrochanteric fracture of the proximal left femur.  Other 

findings as noted above.


 








Chest X-Ray  01/03/17 23:29


IMPRESSION:  NO ACUTE RADIOGRAPHIC FINDING IN THE CHEST.


 














Assessment & Plan





- Diagnosis


(1) Displaced intertrochanteric fracture of left femur, initial encounter for 

closed fracture


Qualifiers: 


     Encounter type: initial encounter        Qualified Code(s): S72.142A - 

Displaced intertrochanteric fracture of left femur, initial encounter for 

closed fracture  


Is this a current diagnosis for this admission?: YesPlan: 


46-year-old gentleman with significant medical comorbidities.  Patient cannot 

be transferred from next couple days therefore we'll proceed to do the surgery 

here tomorrow in the morning.  Discussed the risks and benefits extensively 

with the patient understands is a high-risk patient.  He would like to try to 

ambulate and therefore his wife that the risk and we'll go ahead and consent 

for surgery.  He consented for cephalo-medullary nailing of his left 

intertrochanteric hip fracture.  He will be nothing by mouth after midnight 

start physical is as documented.  Hold any anticoagulation.  Anesthesia is 

aware and has evaluated the patient.  Cardiology consult has been ordered an 

echo has been done.  Meantime pain control and not bedrest.

## 2017-01-04 NOTE — PDOC DISCHARGE SUMMARY
General





- Admit/Disc Date/PCP


Admission Date/Primary Care Provider: 


  01/04/17 01:09





  DANIELLE CHOE SAPNA, 





Discharge Date: 01/04/17





- Discharge Diagnosis


(1) Displaced intertrochanteric fracture of left femur, initial encounter for 

closed fracture


Is this a current diagnosis for this admission?: Yes





(2) UTI (urinary tract infection) due to Enterococcus


Is this a current diagnosis for this admission?: Yes





(3) COPD (chronic obstructive pulmonary disease)


Is this a current diagnosis for this admission?: Yes





(4) Constipation


Is this a current diagnosis for this admission?: Yes





(5) Anemia


Is this a current diagnosis for this admission?: Yes





(6) Chronic kidney disease, stage 2 (mild)


Is this a current diagnosis for this admission?: Yes





(7) Diabetes 1.5, managed as type 1


Is this a current diagnosis for this admission?: Yes





(8) Hyponatremia


Is this a current diagnosis for this admission?: Yes





(9) Spina bifida


Is this a current diagnosis for this admission?: Yes





(10) Carotid stenosis, asymptomatic


Is this a current diagnosis for this admission?: Yes








- Additional Information


Resuscitation Status: Full Code


Home Medications: 








Albuterol Sulfate [Proair HFA] 2 puff IH TIDP PRN 01/04/17 


Amiloride/Hydrochlorothiazide [Amiloride HCl-Hctz 5-50 mg Tab] 1 tab PO DAILY 01 /04/17 


Amitriptyline HCl [Elavil 50 Mg Tablet] 50 mg PO DAILY 01/04/17 


Aspirin/Dipyridamole [Aggrenox 25 mg-200 mg Capsule] 1 cap PO BID 01/04/17 


Benztropine Mesylate 1 mg PO DAILY 01/04/17 


Benztropine Mesylate 2 mg PO QHS 01/04/17 


Ergocalciferol (Vitamin D2) [Vitamin D2] 50,000 unit PO SA@1000 01/04/17 


Fluoxetine HCl [Prozac 20 mg Capsule] 20 mg PO QHS 01/04/17 


Fluoxetine HCl [Prozac] 40 mg PO DAILY 01/04/17 


Glyburide 5 mg PO DAILY 01/04/17 


Insulin Glargine,Hum.rec.anlog [Lantus Solostar] 40 unit SQ QHS 01/04/17 


Metoprolol Tartrate [Lopressor] 50 mg PO Q12 01/04/17 


Olanzapine 5 mg PO DAILY 01/04/17 


Olanzapine 15 mg PO QPM 01/04/17 


Oxycodone HCl/Acetaminophen [Oxycodone-Acetaminophen ] 1 tab PO Q6 01/04/ 17 


Potassium Chloride 10 meq PO DAILY 01/04/17 


Tiotropium Bromide [Spiriva Handihaler 18 mcg/dose (30 Dose)] 1 cap IH DAILY 01/ 04/17 


Tizanidine HCl [Zanaflex] 4 mg PO BIDP PRN 01/04/17 











History of Present Illness


History of Present Illness: 


Please see H&P for HPI





Hospital Course


Hospital Course: 


Patient being treated with linezolid as outpt for VRE of the urine. History of 

spina bifida and neurogenic bladder with self cath. Tripped over dog suffered 

left intertrochanteric fracture of the left femur. Anesthesia declined to 

provide anesthesia. [facility] contacted. Dr. Polanco gratefully accepted. Transfered 

in stable condition.





Physical Exam


Vital Signs: 


 











Temp Pulse Resp BP Pulse Ox


 


 97.4 F   77   17   144/98 H  93 


 


 01/04/17 08:37  01/04/17 07:53  01/04/17 13:01  01/04/17 13:00  01/04/17 13:01








 Intake & Output











 01/03/17 01/04/17 01/05/17





 06:59 06:59 06:59


 


Weight   62.596 kg











Exam: 


General appearance: PRESENT: cooperative


Head exam: PRESENT: atraumatic, normocephalic


Ear exam: PRESENT: normal external ear exam


Mouth exam: PRESENT: moist, tongue midline


Neck exam: ABSENT: carotid bruit, JVD, lymphadenopathy, thyromegaly


Respiratory exam: PRESENT: clear to auscultation david.  ABSENT: rales, rhonchi, 

wheezes


Cardiovascular exam: PRESENT: RRR.  ABSENT: diastolic murmur, rubs, systolic 

murmur


Pulses: PRESENT: normal dorsalis pedis pul


Vascular exam: PRESENT: normal capillary refill


GI/Abdominal exam: PRESENT: diminished bowel sounds, distended, hypoactive 

bowel sounds, normal bowel sounds, soft.  ABSENT: guarding, mass, organolmegaly

, rebound, tenderness


Rectal exam: PRESENT: deferred


Extremities exam: PRESENT: other - Left leg short and externally rotated


Neurological exam: PRESENT: alert, awake, oriented to person, oriented to place

, oriented to time, oriented to situation, CN II-XII grossly intact.  ABSENT: 

motor sensory deficit


Psychiatric exam: PRESENT: unusual affect - At baseline


Skin exam: PRESENT: dry, intact, warm.  ABSENT: cyanosis, rash





Results


Laboratory Results: 


 





 01/04/17 06:35 





 01/04/17 06:35 





 











  01/04/17 01/04/17 01/04/17





  01:47 06:35 06:35


 


WBC   16.9 H 


 


RBC   4.08 L 


 


Hgb   11.4 L 


 


Hct   33.8 L 


 


MCV   83 


 


MCH   27.9 


 


MCHC   33.7 


 


RDW   17.3 H 


 


Plt Count   187 


 


Seg Neutrophils %   78.7 H 


 


Lymphocytes %   11.6 L 


 


Monocytes %   9.2 


 


Eosinophils %   0.2 


 


Basophils %   0.3 


 


Absolute Neutrophils   13.3 H 


 


Absolute Lymphocytes   2.0 


 


Absolute Monocytes   1.6 H 


 


Absolute Eosinophils   0.0 


 


Absolute Basophils   0.1 


 


Sodium    127.2 L


 


Potassium    3.6


 


Chloride    92 L


 


Carbon Dioxide    19 L


 


Anion Gap    16


 


BUN    18


 


Creatinine    1.01


 


Est GFR ( Amer)    > 60


 


Est GFR (Non-Af Amer)    > 60


 


Glucose    113 H


 


Calcium    9.1


 


Urine Color  YELLOW  


 


Urine Appearance  CLOUDY  


 


Urine pH  7.0  


 


Ur Specific Gravity  1.015  


 


Urine Protein  NEGATIVE  


 


Urine Glucose (UA)  NEGATIVE  


 


Urine Ketones  NEGATIVE  


 


Urine Blood  SMALL H  


 


Urine Nitrite  NEGATIVE  


 


Ur Leukocyte Esterase  LARGE H  


 


Urine WBC (Auto)  58  


 


Urine RBC (Auto)  15  











Impressions: 


 





Hip X-Ray  01/03/17 00:00


IMPRESSION:  Intertrochanteric fracture of the proximal left femur.  Other 

findings as noted above.


 








Chest X-Ray  01/03/17 23:29


IMPRESSION:  NO ACUTE RADIOGRAPHIC FINDING IN THE CHEST.


 














Qualifiers


**PATEINT BEING DISCHARGED WITH ANY OF THE FOLLOWING DIAGNOSIS?: No





Plan


Time Spent: Greater than 30 Minutes

## 2017-05-11 ENCOUNTER — HOSPITAL ENCOUNTER (EMERGENCY)
Dept: HOSPITAL 62 - ER | Age: 47
LOS: 1 days | Discharge: HOME | End: 2017-05-12
Payer: MEDICAID

## 2017-05-11 DIAGNOSIS — E10.9: ICD-10-CM

## 2017-05-11 DIAGNOSIS — R26.9: Primary | ICD-10-CM

## 2017-05-11 DIAGNOSIS — I25.2: ICD-10-CM

## 2017-05-11 DIAGNOSIS — Z96.642: ICD-10-CM

## 2017-05-11 DIAGNOSIS — K21.9: ICD-10-CM

## 2017-05-11 DIAGNOSIS — I10: ICD-10-CM

## 2017-05-11 DIAGNOSIS — Z86.73: ICD-10-CM

## 2017-05-11 DIAGNOSIS — J44.9: ICD-10-CM

## 2017-05-11 DIAGNOSIS — R26.2: ICD-10-CM

## 2017-05-11 DIAGNOSIS — I25.10: ICD-10-CM

## 2017-05-11 LAB
ALBUMIN SERPL-MCNC: 4.5 G/DL (ref 3.5–5)
ALP SERPL-CCNC: 150 U/L (ref 38–126)
ALT SERPL-CCNC: 33 U/L (ref 21–72)
ANION GAP SERPL CALC-SCNC: 18 MMOL/L (ref 5–19)
AST SERPL-CCNC: 23 U/L (ref 17–59)
BASOPHILS # BLD AUTO: 0.1 10^3/UL (ref 0–0.2)
BASOPHILS NFR BLD AUTO: 0.7 % (ref 0–2)
BILIRUB DIRECT SERPL-MCNC: 0.4 MG/DL (ref 0–0.4)
BILIRUB SERPL-MCNC: 0.6 MG/DL (ref 0.2–1.3)
BUN SERPL-MCNC: 10 MG/DL (ref 7–20)
CALCIUM: 9.5 MG/DL (ref 8.4–10.2)
CHLORIDE SERPL-SCNC: 87 MMOL/L (ref 98–107)
CO2 SERPL-SCNC: 21 MMOL/L (ref 22–30)
CREAT SERPL-MCNC: 1.01 MG/DL (ref 0.52–1.25)
EOSINOPHIL # BLD AUTO: 0.2 10^3/UL (ref 0–0.6)
EOSINOPHIL NFR BLD AUTO: 2.6 % (ref 0–6)
ERYTHROCYTE [DISTWIDTH] IN BLOOD BY AUTOMATED COUNT: 17.3 % (ref 11.5–14)
GLUCOSE SERPL-MCNC: 117 MG/DL (ref 75–110)
HCT VFR BLD CALC: 37.7 % (ref 37.9–51)
HGB BLD-MCNC: 12.5 G/DL (ref 13.5–17)
HGB HCT DIFFERENCE: -0.2
LYMPHOCYTES # BLD AUTO: 2.1 10^3/UL (ref 0.5–4.7)
LYMPHOCYTES NFR BLD AUTO: 22.3 % (ref 13–45)
MCH RBC QN AUTO: 29.2 PG (ref 27–33.4)
MCHC RBC AUTO-ENTMCNC: 33.2 G/DL (ref 32–36)
MCV RBC AUTO: 88 FL (ref 80–97)
MONOCYTES # BLD AUTO: 0.6 10^3/UL (ref 0.1–1.4)
MONOCYTES NFR BLD AUTO: 6.8 % (ref 3–13)
NEUTROPHILS # BLD AUTO: 6.3 10^3/UL (ref 1.7–8.2)
NEUTS SEG NFR BLD AUTO: 67.6 % (ref 42–78)
POTASSIUM SERPL-SCNC: 3.8 MMOL/L (ref 3.6–5)
PROT SERPL-MCNC: 8 G/DL (ref 6.3–8.2)
RBC # BLD AUTO: 4.29 10^6/UL (ref 4.35–5.55)
SODIUM SERPL-SCNC: 125.7 MMOL/L (ref 137–145)
WBC # BLD AUTO: 9.3 10^3/UL (ref 4–10.5)

## 2017-05-11 PROCEDURE — 36415 COLL VENOUS BLD VENIPUNCTURE: CPT

## 2017-05-11 PROCEDURE — 85025 COMPLETE CBC W/AUTO DIFF WBC: CPT

## 2017-05-11 PROCEDURE — 99284 EMERGENCY DEPT VISIT MOD MDM: CPT

## 2017-05-11 PROCEDURE — 80053 COMPREHEN METABOLIC PANEL: CPT

## 2017-05-12 VITALS — DIASTOLIC BLOOD PRESSURE: 75 MMHG | SYSTOLIC BLOOD PRESSURE: 118 MMHG

## 2017-05-12 NOTE — ER DOCUMENT REPORT
ED Extremity Problem, Lower





- General


Chief Complaint: Trouble Walking


Stated Complaint: SHAKING,UNSTEADY,WEAKNESS


Time Seen by Provider: 05/11/17 22:37


Mode of Arrival: Wheelchair


Information source: Patient


Notes: 


Patient is a 46-year-old male who comes into the emergency department today 

with unsteady gait, problems walking, falling, and tremors.  Patient has had 

these going on for approximately a week and has an appointment with a 

neurologist coming up this coming week.  Patient has seen multiple neurologists 

for similar issues in the past who have never diagnosed him with anything so he 

stopped going to them.  He denies any overall weakness, confusion, 

lightheadedness, dizziness.  He states "my legs just keep given out on me."  He 

denies any back pain.  He denies any numbness or tingling.


TRAVEL OUTSIDE OF THE [place] IN LAST 30 DAYS: No





- Related Data


Allergies/Adverse Reactions: 


 





bee pollen [Bee Pollen] Allergy (Verified 05/11/17 20:10)


 


ketorolac [From Toradol] Allergy (Verified 05/11/17 20:15)


 











Past Medical History





- General


Information source: Patient





- Social History


Smoking Status: Unknown if Ever Smoked


Family History: Reviewed & Not Pertinent, COPD, DM





- Past Medical History


Cardiac Medical History: Reports: Hx Coronary Artery Disease, Hx Heart Attack, 

Hx Hypertension


Pulmonary Medical History: Reports: Hx Bronchitis, Hx COPD, Hx Pneumonia


Neurological Medical History: Reports: Hx Cerebrovascular Accident, Hx Migraine

, Hx Seizures


Endocrine Medical History: Reports: Hx Diabetes Mellitus Type 1


Renal/ Medical History: Reports: Hx Benign Prostatic Hyperplasia, Hx Kidney 

Stones.  Denies: Hx Peritoneal Dialysis


GI Medical History: Reports: Hx Gastroesophageal Reflux Disease, Hx Ulcer


Musculoskeltal Medical History: Reports Hx Arthritis


Psychiatric Medical History: Reports: Hx Anxiety - YES,CONTROLLED, Hx Attention 

Deficit Hyperactivity Disorder, Hx Depression, Hx Personality Disorder


Past Surgical History: Reports: Hx Open Heart Surgery - correction of heart 

murmur, Hx Orthopedic Surgery - partial left hip replacement, Hx Tonsillectomy





- Immunizations


Hx Diphtheria, Pertussis, Tetanus Vaccination: No - unk


Hx Pneumococcal Vaccination: 01/01/15





Review of Systems





- Review of Systems


Constitutional: No symptoms reported


EENT: No symptoms reported


Cardiovascular: No symptoms reported


Respiratory: No symptoms reported


Gastrointestinal: No symptoms reported


Genitourinary: No symptoms reported


Male Genitourinary: No symptoms reported


Musculoskeletal: No symptoms reported


Skin: No symptoms reported


Hematologic/Lymphatic: No symptoms reported


Neurological/Psychological: See HPI





Physical Exam





- Vital signs


Vitals: 





 











Temp Pulse Resp BP Pulse Ox


 


 98.7 F   82   16   113/71   100 


 


 05/11/17 20:10  05/11/17 20:10  05/11/17 20:10  05/11/17 20:10  05/11/17 20:10














- Notes


Notes: 


PHYSICAL EXAMINATION: 


GENERAL: Appears much older than stated age, in no acute distress. 


HEAD: Atraumatic, normocephalic. 


EYES: Pupils equal round and reactive to light, extraocular movements intact, 

sclera anicteric, conjunctiva are normal. 


ENT: ear canals without erythema or foreign body, TMs pearly grey with good 

bony landmarks, nares patent, oropharynx clear without exudates. Moist mucous 

membranes. 


NECK: Normal range of motion, supple without lymphadenopathy 


LUNGS: CTAB and equal. No wheezes rales or rhonchi. 


HEART: Regular rate and rhythm without murmurs


ABDOMEN: Soft, no tenderness. No guarding, no rebound 


BACK: no vertebral tenderness, normal ROM


GI/: no CVA tenderness


EXTREMITIES: Normal range of motion, no pitting edema. No cyanosis. 


NEUROLOGICAL: pt unsteady on feet, but otherwise good strength with exam 

bilaterally, equal, Cranial nerves grossly intact. Normal sensory/motor exams. 


PSYCH: Normal mood, normal affect. 


SKIN: Warm, Dry, normal turgor, no rashes or lesions noted 

















Course





- Re-evaluation


Re-evalutation: 





05/12/17 00:02


Lab work is a patient's baseline today with a normal white blood cell count.  

Patient has chronic low sodium.





- Vital Signs


Vital signs: 





 











Temp Pulse Resp BP Pulse Ox


 


 98.7 F   82   16   113/71   100 


 


 05/11/17 20:10  05/11/17 20:10  05/11/17 20:10  05/11/17 20:10  05/11/17 20:10














- Laboratory


Result Diagrams: 


 05/11/17 23:08





 05/11/17 23:08


Laboratory results interpreted by me: 





 











  05/11/17 05/11/17





  23:08 23:08


 


RBC  4.29 L 


 


Hgb  12.5 L 


 


Hct  37.7 L 


 


RDW  17.3 H 


 


Sodium   125.7 L


 


Chloride   87 L


 


Carbon Dioxide   21 L


 


Glucose   117 H


 


Alkaline Phosphatase   150 H














Discharge





- Discharge


Clinical Impression: 


 Unsteady gait





Condition: Stable


Disposition: HOME, SELF-CARE


Additional Instructions: 


Return immediately for any new or worsening symptoms.





Follow up with primary care provider, call tomorrow to make followup 

appointment.

## 2017-09-11 ENCOUNTER — HOSPITAL ENCOUNTER (EMERGENCY)
Dept: HOSPITAL 62 - ER | Age: 47
Discharge: HOME | End: 2017-09-11
Payer: MEDICAID

## 2017-09-11 VITALS — DIASTOLIC BLOOD PRESSURE: 69 MMHG | SYSTOLIC BLOOD PRESSURE: 118 MMHG

## 2017-09-11 DIAGNOSIS — R07.9: Primary | ICD-10-CM

## 2017-09-11 DIAGNOSIS — I10: ICD-10-CM

## 2017-09-11 DIAGNOSIS — G80.9: ICD-10-CM

## 2017-09-11 LAB
ALBUMIN SERPL-MCNC: 4.3 G/DL (ref 3.5–5)
ALP SERPL-CCNC: 133 U/L (ref 38–126)
ALT SERPL-CCNC: 36 U/L (ref 21–72)
ANION GAP SERPL CALC-SCNC: 16 MMOL/L (ref 5–19)
AST SERPL-CCNC: 35 U/L (ref 17–59)
BASOPHILS # BLD AUTO: 0.2 10^3/UL (ref 0–0.2)
BASOPHILS NFR BLD AUTO: 1.3 % (ref 0–2)
BILIRUB DIRECT SERPL-MCNC: 0.4 MG/DL (ref 0–0.4)
BILIRUB SERPL-MCNC: 0.4 MG/DL (ref 0.2–1.3)
BUN SERPL-MCNC: 10 MG/DL (ref 7–20)
CALCIUM: 9.7 MG/DL (ref 8.4–10.2)
CHLORIDE SERPL-SCNC: 94 MMOL/L (ref 98–107)
CK SERPL-CCNC: 72 U/L (ref 55–170)
CO2 SERPL-SCNC: 22 MMOL/L (ref 22–30)
CREAT SERPL-MCNC: 0.92 MG/DL (ref 0.52–1.25)
EOSINOPHIL # BLD AUTO: 0.4 10^3/UL (ref 0–0.6)
EOSINOPHIL NFR BLD AUTO: 3.1 % (ref 0–6)
ERYTHROCYTE [DISTWIDTH] IN BLOOD BY AUTOMATED COUNT: 16.5 % (ref 11.5–14)
GLUCOSE SERPL-MCNC: 199 MG/DL (ref 75–110)
HCT VFR BLD CALC: 37.3 % (ref 37.9–51)
HGB BLD-MCNC: 12.6 G/DL (ref 13.5–17)
HGB HCT DIFFERENCE: 0.5
LYMPHOCYTES # BLD AUTO: 3.6 10^3/UL (ref 0.5–4.7)
LYMPHOCYTES NFR BLD AUTO: 28.3 % (ref 13–45)
MCH RBC QN AUTO: 30.3 PG (ref 27–33.4)
MCHC RBC AUTO-ENTMCNC: 33.7 G/DL (ref 32–36)
MCV RBC AUTO: 90 FL (ref 80–97)
MONOCYTES # BLD AUTO: 0.9 10^3/UL (ref 0.1–1.4)
MONOCYTES NFR BLD AUTO: 7.1 % (ref 3–13)
NEUTROPHILS # BLD AUTO: 7.7 10^3/UL (ref 1.7–8.2)
NEUTS SEG NFR BLD AUTO: 60.2 % (ref 42–78)
POTASSIUM SERPL-SCNC: 4.1 MMOL/L (ref 3.6–5)
PROT SERPL-MCNC: 7.4 G/DL (ref 6.3–8.2)
RBC # BLD AUTO: 4.15 10^6/UL (ref 4.35–5.55)
SODIUM SERPL-SCNC: 132 MMOL/L (ref 137–145)
WBC # BLD AUTO: 12.8 10^3/UL (ref 4–10.5)

## 2017-09-11 PROCEDURE — 93010 ELECTROCARDIOGRAM REPORT: CPT

## 2017-09-11 PROCEDURE — 99285 EMERGENCY DEPT VISIT HI MDM: CPT

## 2017-09-11 PROCEDURE — 93005 ELECTROCARDIOGRAM TRACING: CPT

## 2017-09-11 PROCEDURE — 80053 COMPREHEN METABOLIC PANEL: CPT

## 2017-09-11 PROCEDURE — 36415 COLL VENOUS BLD VENIPUNCTURE: CPT

## 2017-09-11 PROCEDURE — 82550 ASSAY OF CK (CPK): CPT

## 2017-09-11 PROCEDURE — 84484 ASSAY OF TROPONIN QUANT: CPT

## 2017-09-11 PROCEDURE — 85025 COMPLETE CBC W/AUTO DIFF WBC: CPT

## 2017-09-11 PROCEDURE — 71010: CPT

## 2017-09-11 NOTE — RADIOLOGY REPORT (SQ)
EXAM DESCRIPTION:  CHEST SINGLE VIEW



COMPLETED DATE/TIME:  9/11/2017 3:48 am



REASON FOR STUDY:  chest pain



COMPARISON:  1/3/2017.



EXAM PARAMETERS:  NUMBER OF VIEWS: One view.

TECHNIQUE: Single frontal radiographic view of the chest acquired.

RADIATION DOSE: NA

LIMITATIONS: None.



FINDINGS:  LUNGS AND PLEURA: No opacities, masses or pneumothorax. No pleural effusion.

MEDIASTINUM AND HILAR STRUCTURES: No masses.  Contour normal.

HEART AND VASCULAR STRUCTURES: Heart normal in size.  Normal vasculature.

BONES: No acute findings.  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

HARDWARE: None in the chest.

OTHER: No other significant finding.



IMPRESSION:  NO ACUTE RADIOGRAPHIC FINDING IN THE CHEST.



TECHNICAL DOCUMENTATION:  JOB ID:  [id number]

## 2017-09-11 NOTE — ER DOCUMENT REPORT
ED Cardiac





- General


Chief Complaint: Chest Pain > 30


Stated Complaint: CHEST PAIN


Time Seen by Provider: 09/11/17 02:55


Notes: 


The patient is a 47-year-old male, past medical history hypertension, cerebral 

palsy, prior CVA, presents with 1 hour of substernal burning sensation.  He has 

had multiple episodes like this in the past and they usually resolve after Pepto

-Bismol.  Tonight, the symptoms did not resolve after the Pepto-Bismol.  He had 

a stress test a few months ago for similar complaints, which the patient said 

was normal.  Patient denies shortness of breath, abdominal pain, nausea, 

vomiting, diaphoresis, back pain, fevers, cough, hemoptysis or rash.


TRAVEL OUTSIDE OF THE [place] IN LAST 30 DAYS: No





- Related Data


Allergies/Adverse Reactions: 


 





bee pollen [Bee Pollen] Allergy (Verified 09/11/17 03:01)


 


ketorolac [From Toradol] Allergy (Verified 09/11/17 03:01)


 











Past Medical History





- General


Information source: Patient





- Social History


Smoking Status: Unknown if Ever Smoked


Family History: Reviewed & Not Pertinent, COPD, DM


Patient has suicidal ideation: No


Patient has homicidal ideation: No





- Past Medical History


Cardiac Medical History: Reports: Hx Coronary Artery Disease, Hx Heart Attack, 

Hx Hypertension


Pulmonary Medical History: Reports: Hx Bronchitis, Hx COPD, Hx Pneumonia


Neurological Medical History: Reports: Hx Cerebrovascular Accident, Hx Migraine

, Hx Seizures


Endocrine Medical History: Reports: Hx Diabetes Mellitus Type 1


Renal/ Medical History: Reports: Hx Benign Prostatic Hyperplasia, Hx Kidney 

Stones.  Denies: Hx Peritoneal Dialysis


GI Medical History: Reports: Hx Gastroesophageal Reflux Disease, Hx Ulcer


Musculoskeltal Medical History: Reports Hx Arthritis


Psychiatric Medical History: Reports: Hx Anxiety - YES,CONTROLLED, Hx Attention 

Deficit Hyperactivity Disorder, Hx Depression, Hx Personality Disorder


Past Surgical History: Reports: Hx Open Heart Surgery - correction of heart 

murmur, Hx Orthopedic Surgery - partial left hip replacement, Hx Tonsillectomy





- Immunizations


Hx Diphtheria, Pertussis, Tetanus Vaccination: No - unk


Hx Pneumococcal Vaccination: 01/01/15





Review of Systems





- Review of Systems


Notes: 


REVIEW OF SYSTEMS:


CONSTITUTIONAL: -fevers, -chills


EENT: -eye pain, -difficulty swallowing, -nasal congestion


CARDIOVASCULAR: +chest pain, -syncope.


RESPIRATORY: -cough, -SOB


GASTROINTESTINAL:  -abdominal pain, - nausea, -vomiting, -diarrhea


GENITOURINARY: -dysuria, -hematuria


MUSCULOSKELETAL: -back pain, -neck pain


SKIN: -rash or skin lesions.


HEMATOLOGIC: -easy bruising or bleeding.


LYMPHATIC: -swollen, enlarged glands.


NEUROLOGICAL: -altered mental status or loss of consciousness, -headache, -

neurologic symptoms


PSYCHIATRIC: -anxiety, -depression.


ALL OTHER SYSTEMS REVIEWED AND NEGATIVE.





Physical Exam





- Vital signs


Vitals: 


 











Temp Pulse Resp BP Pulse Ox


 


 97.6 F   67   20   161/92 H  100 


 


 09/11/17 02:48  09/11/17 02:48  09/11/17 02:48  09/11/17 02:48  09/11/17 02:48














- Notes


Notes: 


PHYSICAL EXAMINATION:





GENERAL: Well-appearing, well-nourished and in no acute distress.





HEAD: Atraumatic, normocephalic.





EYES: Pupils equal round and reactive to light, extraocular movements intact, 

sclera anicteric, conjunctiva are normal.





ENT: nares patent, oropharynx clear without exudates.  Moist mucous membranes.





NECK: Normal range of motion, supple without lymphadenopathy





LUNGS: Breath sounds clear to auscultation bilaterally and equal.  No wheezes 

rales or rhonchi.





HEART: Regular rate and rhythm without murmurs





ABDOMEN: Soft, nontender, normoactive bowel sounds.  No guarding, no rebound.  

No masses appreciated.





EXTREMITIES: Normal range of motion, no pitting or edema.  No cyanosis.





NEUROLOGICAL: Cranial nerves grossly intact.  Normal speech, normal gait.  

Normal sensory and motor exams.





PSYCH: Normal mood, normal affect.





SKIN: Warm, Dry, normal turgor, no rashes or lesions noted.





Course





- Re-evaluation


Re-evalutation: 


Patient appears well.  His EKG and 2 sets of troponins do not show any acute 

abnormalities or evidence of acute coronary disease at this time.  He has had 

multiple ER visits for similar complaints.  He has a slight leukocytosis, which 

is common for him compared to his prior lab results. His chest x-ray does not 

show any acute abnormalities.  Patient's HEART score is 3. He is PERC negative 

and symptoms are atypical for aortic dissection or esophageal perforation at 

this time. Will discharge patient home with follow-up at his primary care 

physician and cardiologist for further evaluation and treatment.





- Vital Signs


Vital signs: 


 











Temp Pulse Resp BP Pulse Ox


 


 97.6 F   67   13   121/84   100 


 


 09/11/17 02:48  09/11/17 02:48  09/11/17 05:01  09/11/17 05:01  09/11/17 05:01














- Laboratory


Result Diagrams: 


 09/11/17 03:30





 09/11/17 03:30


Laboratory results interpreted by me: 


 











  09/11/17 09/11/17





  03:30 03:30


 


WBC   12.8 H


 


RBC   4.15 L


 


Hgb   12.6 L


 


Hct   37.3 L


 


RDW   16.5 H


 


Plt Count   148 L


 


Sodium  132.0 L 


 


Chloride  94 L 


 


Glucose  199 H 


 


Alkaline Phosphatase  133 H 














- Diagnostic Test


Radiology reviewed: Image reviewed, Reports reviewed


Radiology results interpreted by me: 


CXR: NAD





- EKG Interpretation by Me


EKG shows normal: Sinus rhythm, Axis, Intervals, QRS Complexes, ST-T Waves


Rate: Normal


When compared to previous EKG there are: No significant change





Discharge





- Discharge


Clinical Impression: 


Chest pain


Qualifiers:


 Chest pain type: unspecified Qualified Code(s): R07.9 - Chest pain, unspecified





Condition: Stable


Disposition: HOME, SELF-CARE


Additional Instructions: 


CHEST PAIN OF UNCLEAR CAUSE:





     The exact cause of your chest pain isn't clear.  Fortunately, there is no 

evidence of a dangerous medical condition.  Further testing may be required to 

find the source of the pain.


     Most often, we find that this pain is coming from the chest wall -- the 

muscles or rib joints in the chest.  But chest pain can come from the lung and 

lung lining, the esophagus, the heart valves or heart lining, and even the 

stomach or gallbladder.


     Rest.  Eat lightly until the pain is gone.  We may prescribe medicine for 

pain and inflammation.


     You should call the physician immediately if the pain radiates to the 

shoulder, jaw or arms; if you start to run a fever or develop a cough; or if 

you develop shortness of breath, or other new or alarming symptoms.








NORMAL EXAM AND WORKUP:


     At this time, your examination and workup show no significant abnormality.

  No significant abnormal physical findings were noted.  All laboratory, EKG, 

and imaging (x-ray, CT scans, ultrasound) studies that were ordered show no 

significant abnormality.


     Although your examination and all studies that were ordered showed no 

significant abnormal finding, there are no examinations and no studies that are 

100% accurate.  There is always the possibility that some abnormality could 

exist and not be detected with physical examination or within the limits and 

capabilities of laboratory and other studies.


     You should return or follow up as you were instructed on your visit today 

for further evaluation if your symptoms do not resolve.








CHEST WALL PAIN:


     Your chest pain may be coming from the chest wall. This is often caused by 

straining the muscles or joints in the chest during physical activity, direct 

trauma, coughing, or vigorous vomiting.  Persons with arthritis are especially 

prone to this type of pain, due to inflammation of the cartilage joints near 

the breast bone. Occasionally, no cause can be found.


     Rest from strenuous physical activity.  This kind of chest pain is usually 

made worse by movement of the chest.  Depending on the symptoms, we may 

prescribe medicine for pain, muscle relaxation, and antiinflammatory effects.


     If the pain is new, and seems to be due to muscle strain, cold packs can 

help.  Otherwise, apply gentle warmth to the painful area for 15 minutes every 

hour or two.


     You should call contact the doctor immediately if things change. Further 

evaluation is needed if you develop a fever or cough, if the nature of the pain 

changes, or if you become short of breath.








ANGINA EPISODE:


     Your physician has diagnosed the pain you experienced as an episode of 

angina.  Angina occurs when a portion of the heart muscle temporarily lacks 

oxygen.  It does not cause any permanent heart damage, but serves as a warning.


     Hospitalization is not necessary now.  Evaluation of your cardiac condition

, and medical therapy for angina will be necessary.  It's important you be sure 

to keep all appointments and take medication exactly as prescribed.


     Angina is usually treated with a type of "nitrate" medication. This is 

available as ointment, pills, or sublingual (under the tongue) tablets.  

Depending on your clinical situation, other medications may be added to help 

control angina.  These may include beta blockers or calcium blockers.


     If episodes of angina are occurring with increased frequency, or if chest 

pain lasts longer than 15 minutes or does not respond to nitroglycerin, you 

must seek emergency medical care immediately.








ACID REFLUX DISEASE (GERD):


     Gastro-Esophageal Reflux Disease (GERD) is caused by stomach acid 

refluxing back up into the esophagus. The valve at the end of the esophagus may 

be weak. This is common in persons with a hiatal hernia. GERD symptoms can 

include indigestion, chest pain, heartburn, or food "sticking." Certain foods, 

alcohol, and aspirin can make GERD worse.


     Treatment depends on the severity. Usually, antacids or acid-suppressing 

medicines are used. When the esophagus is acutely inflamed, the physician will 

often prescribe membrane-protective drugs such as Carafate.  Some patients 

benefit from medication such as Reglan that tightens the valve at the top of 

the stomach.


     Avoid those foods that bring on your symptoms. For many people, these 

foods are coffee, chocolate, onions, garlic, and carbonated drinks. Don't use 

alcohol, aspirin, caffeine, or tobacco. Don't eat late at night -- within 4 

hours of bedtime. Don't over-eat. If necessary, elevate the head of your bed 

about 4 inches so that stomach acid will not roll up into your esophagus.


     Call the doctor if you develop severe chest pain, inability to swallow 

fluids, fever, or worsening symptoms.








ASPIRIN:


     Aspirin has been shown to have a beneficial effect on blood circulation by 

reducing the clotting effect of platelets in the blood.  These beneficial 

effects can be achieved by taking just a single baby (81 mg) aspirin a day.  It 

is recommended that any person over the age of forty take a single baby aspirin 

every day for heart and brain circulation, unless you are allergic to aspirin 

or have some significant bleeding disorder.  It is strongly recommended that 

people who have proven cardiac or blood circulation disturbances should take a 

baby aspirin every day.








ANTACID THERAPY:


     You have been instructed to start antacid therapy.  Antacids directly 

neutralize stomach acid.  This is useful for acid irritation of the esophagus, 

gastritis, and ulcers.


     You should take two tablespoons of antacid one hour after each meal and 

three hours after each meal.  If you are not eating, take the antacid every two 

hours.  If you are using a concentrate (such as Maalox TC), use only one 

tablespoon.


     Many antacids affect the bowels.  The most common problem is diarrhea.  In 

this case, a pure aluminum hydroxide antacid (such as AlternaGel) can be 

substituted for some or all doses.  If the problem is constipation, add a 

teaspoon of Milk of Magnesia to each dose.


     Call the doctor if you experience continued diarrhea or constipation, or 

if you develop lightheadedness, bloody stool or vomitus, severe abdominal pain, 

or black stool.








PRILOSEC (ACID PUMP INHIBITOR): 


     Prilosec (omeprazole) is an acid-pump inhibitor.  It blocks the secretion 

of hydrogen ions in the acid-producing cells of the stomach. Prilosec keeps 

your stomach from making acid.


     Take all medication as prescribed, even after the pain is gone. Regular 

antacids may be added as needed if you have symptoms while taking this medicine.


     There are usually no side effects from this medication.  Contact your 

doctor if there is fever, rash, yellow skin color, increasing abdominal pain, 

weakness, or unusual bruising.


     Return at once if you develop lightheadedness, black or bloody stool, or 

bloody vomitus.





FOLLOW-UP CARE:


If you have been referred to a physician for follow-up care, call the physician

s office for an appointment as you were instructed or within the next two days.

  If you experience worsening or a significant change in your symptoms, notify 

the physician immediately or return to the Emergency Department at any time for 

re-evaluation.


Referrals: 


RITIKA GUIDO MD [ACTIVE STAFF] - Follow up as needed


JONO WEBB MD [ACTIVE STAFF] - Follow up as needed

## 2018-06-14 ENCOUNTER — HOSPITAL ENCOUNTER (EMERGENCY)
Dept: HOSPITAL 62 - ER | Age: 48
Discharge: HOME | End: 2018-06-14
Payer: MEDICAID

## 2018-06-14 VITALS — DIASTOLIC BLOOD PRESSURE: 76 MMHG | SYSTOLIC BLOOD PRESSURE: 120 MMHG

## 2018-06-14 DIAGNOSIS — Z87.891: ICD-10-CM

## 2018-06-14 DIAGNOSIS — J40: Primary | ICD-10-CM

## 2018-06-14 DIAGNOSIS — I25.2: ICD-10-CM

## 2018-06-14 DIAGNOSIS — R07.9: ICD-10-CM

## 2018-06-14 DIAGNOSIS — I25.10: ICD-10-CM

## 2018-06-14 DIAGNOSIS — E10.9: ICD-10-CM

## 2018-06-14 DIAGNOSIS — I10: ICD-10-CM

## 2018-06-14 DIAGNOSIS — Z86.73: ICD-10-CM

## 2018-06-14 LAB
ADD MANUAL DIFF: NO
ALBUMIN SERPL-MCNC: 3.7 G/DL (ref 3.5–5)
ALP SERPL-CCNC: 90 U/L (ref 38–126)
ALT SERPL-CCNC: 26 U/L (ref 21–72)
ANION GAP SERPL CALC-SCNC: 13 MMOL/L (ref 5–19)
AST SERPL-CCNC: 22 U/L (ref 17–59)
BASOPHILS # BLD AUTO: 0.1 10^3/UL (ref 0–0.2)
BASOPHILS NFR BLD AUTO: 1 % (ref 0–2)
BILIRUB DIRECT SERPL-MCNC: 0.2 MG/DL (ref 0–0.4)
BILIRUB SERPL-MCNC: 0.2 MG/DL (ref 0.2–1.3)
BUN SERPL-MCNC: 14 MG/DL (ref 7–20)
CALCIUM: 9.2 MG/DL (ref 8.4–10.2)
CHLORIDE SERPL-SCNC: 94 MMOL/L (ref 98–107)
CO2 SERPL-SCNC: 21 MMOL/L (ref 22–30)
EOSINOPHIL # BLD AUTO: 0.2 10^3/UL (ref 0–0.6)
EOSINOPHIL NFR BLD AUTO: 1.8 % (ref 0–6)
ERYTHROCYTE [DISTWIDTH] IN BLOOD BY AUTOMATED COUNT: 15.3 % (ref 11.5–14)
GLUCOSE SERPL-MCNC: 149 MG/DL (ref 75–110)
HCT VFR BLD CALC: 33.3 % (ref 37.9–51)
HGB BLD-MCNC: 11.1 G/DL (ref 13.5–17)
LYMPHOCYTES # BLD AUTO: 2.8 10^3/UL (ref 0.5–4.7)
LYMPHOCYTES NFR BLD AUTO: 26.5 % (ref 13–45)
MCH RBC QN AUTO: 28.9 PG (ref 27–33.4)
MCHC RBC AUTO-ENTMCNC: 33.3 G/DL (ref 32–36)
MCV RBC AUTO: 87 FL (ref 80–97)
MONOCYTES # BLD AUTO: 0.8 10^3/UL (ref 0.1–1.4)
MONOCYTES NFR BLD AUTO: 7.8 % (ref 3–13)
NEUTROPHILS # BLD AUTO: 6.6 10^3/UL (ref 1.7–8.2)
NEUTS SEG NFR BLD AUTO: 62.9 % (ref 42–78)
PLATELET # BLD: 259 10^3/UL (ref 150–450)
POTASSIUM SERPL-SCNC: 4.3 MMOL/L (ref 3.6–5)
PROT SERPL-MCNC: 6.4 G/DL (ref 6.3–8.2)
RBC # BLD AUTO: 3.83 10^6/UL (ref 4.35–5.55)
SODIUM SERPL-SCNC: 127.6 MMOL/L (ref 137–145)
TOTAL CELLS COUNTED % (AUTO): 100 %
WBC # BLD AUTO: 10.5 10^3/UL (ref 4–10.5)

## 2018-06-14 PROCEDURE — 84484 ASSAY OF TROPONIN QUANT: CPT

## 2018-06-14 PROCEDURE — 93010 ELECTROCARDIOGRAM REPORT: CPT

## 2018-06-14 PROCEDURE — 36415 COLL VENOUS BLD VENIPUNCTURE: CPT

## 2018-06-14 PROCEDURE — 71045 X-RAY EXAM CHEST 1 VIEW: CPT

## 2018-06-14 PROCEDURE — 94640 AIRWAY INHALATION TREATMENT: CPT

## 2018-06-14 PROCEDURE — 99285 EMERGENCY DEPT VISIT HI MDM: CPT

## 2018-06-14 PROCEDURE — 85025 COMPLETE CBC W/AUTO DIFF WBC: CPT

## 2018-06-14 PROCEDURE — 93005 ELECTROCARDIOGRAM TRACING: CPT

## 2018-06-14 PROCEDURE — 80053 COMPREHEN METABOLIC PANEL: CPT

## 2018-06-14 NOTE — RADIOLOGY REPORT (SQ)
EXAM DESCRIPTION: 



XR CHEST 1 VIEW



COMPLETED DATE/TME:  06/14/2018 03:04



CLINICAL HISTORY: chest pain



COMPARISON: 9/11/2017



FINDINGS: Single frontal view of the chest.  The

cardiomediastinal silhouette has normal size and contour. No

consolidation, pneumothorax, or pleural effusion.  Left chest

wall configuration is stable. No acute osseous abnormalities.

Leads overlie the chest.  Upper abdominal soft tissues are

unremarkable.



IMPRESSION:



1. No acute pulmonary process identified.

## 2018-06-14 NOTE — ER DOCUMENT REPORT
ED General





- General


Chief Complaint: Chest Pain > 30


Stated Complaint: CHEST PAINS


Time Seen by Provider: 06/14/18 02:55


Notes: 


Patient is a pleasant 47-year-old male who presents with complaint of recurrent 

cough and some chest pain.  Started not feeling well yesterday.  Today he 

started coughing and is having some pain in his chest as well.  Pain is 

sternal.  He does have history of acid reflux.  He did take some Maalox which 

that helped just a small amount.  He said he had pneumonia a year and a half 

ago and this feels somewhat similar.  He does have a history of recurrent 

bronchitis.  He did quit smoking cigarettes 3 years ago.  He still smokes E 

cigarettes.  No fevers at home.  No vomiting.  No diarrhea.  He says he did 

have a history of a heart attack back in early 2000 but did not require any 

stents in his heart.  No other complaints at this time.  Patient had a negative 

stress test last year.





TRAVEL OUTSIDE OF THE [place] IN LAST 30 DAYS: No





- Related Data


Allergies/Adverse Reactions: 


 





bee pollen [Bee Pollen] Allergy (Verified 09/11/17 03:01)


 


ketorolac [From Toradol] Allergy (Verified 09/11/17 03:01)


 











Past Medical History





- Social History


Smoking Status: Former Smoker


Frequency of alcohol use: None


Drug Abuse: None


Family History: Reviewed & Not Pertinent, COPD, DM





- Past Medical History


Cardiac Medical History: Reports: Hx Coronary Artery Disease, Hx Heart Attack, 

Hx Hypertension


Pulmonary Medical History: Reports: Hx Bronchitis, Hx COPD, Hx Pneumonia


Neurological Medical History: Reports: Hx Cerebrovascular Accident, Hx Migraine

, Hx Seizures


Endocrine Medical History: Reports: Hx Diabetes Mellitus Type 1


Renal/ Medical History: Reports: Hx Benign Prostatic Hyperplasia, Hx Kidney 

Stones.  Denies: Hx Peritoneal Dialysis


GI Medical History: Reports: Hx Gastroesophageal Reflux Disease, Hx Ulcer


Musculoskeltal Medical History: Reports Hx Arthritis


Psychiatric Medical History: Reports: Hx Anxiety - YES,CONTROLLED, Hx Attention 

Deficit Hyperactivity Disorder, Hx Depression, Hx Personality Disorder


Past Surgical History: Reports: Hx Open Heart Surgery - correction of heart 

murmur, Hx Orthopedic Surgery - partial left hip replacement, Hx Tonsillectomy





- Immunizations


Hx Diphtheria, Pertussis, Tetanus Vaccination: No - unk


Hx Pneumococcal Vaccination: 01/01/15





Review of Systems





- Review of Systems


Notes: 


My Normal Review Basic





REVIEW OF SYSTEMS:


CONSTITUTIONAL :  Denies fever,  chills, or sweats.  Denies recent illness.


EENT:   Denies eye, ear, throat, or mouth pain or symptoms.  Denies nasal or 

sinus congestion.


CARDIOVASCULAR: Chest pain


RESPIRATORY: Cough and congestion.  Wheezing.


GASTROINTESTINAL:  Denies abdominal pain.  Denies nausea, vomiting, or 

diarrhea.  Denies constipation.  Last BM: 


MUSCULOSKELETAL:  Denies neck or back pain or joint pain or swelling.


SKIN:   Denies rash or skin lesions.


NEUROLOGICAL:  Denies altered mental status or loss of consciousness.  Denies 

headache.  Denies weakness or paralysis or loss of use of either side.  Denies 

problems with gait or speech.  Denies sensory or motor loss.


ALL OTHER SYSTEMS REVIEWED AND NEGATIVE.








Physical Exam





- Vital signs


Vitals: 


 











Temp Pulse Resp BP Pulse Ox


 


 97.6 F   84   18   122/84   96 


 


 06/14/18 02:46  06/14/18 02:46  06/14/18 02:46  06/14/18 02:46  06/14/18 02:46














- Notes


Notes: 





General Appearance: Well nourished, alert, cooperative, no acute distress, no 

obvious discomfort.


Vitals: reviewed, See vital signs table.


Eyes: PERRL, EOMI, Conjuctiva clear


Mouth: No decreasd moisture


Throat: No tonsillar inflammation, No airway obstruction,  No lymphadenopathy


Chest wall: Pain is easily reproducible to palpation over anterior chest wall 

over the sternal area.  Pain reproduced with palpation of the same pain at the 

patient's been having.


Neck: Supple, no neck tenderness


Lungs: Diffuse wheezing, No rales, some rhonci, No accessory muscle use, good 

air exchange bilaterally.


Heart: Normal rate, Regular rythm, No murmur, no rub


Abdomen: Normal BS, soft, No rigidity, No abdominal tenderness, No guarding, no 

rebound, 


Extremities: strength 5/5 in all extremities, good pulses in all extremities, 

no swelling or tenderness in the extremities, no edema.


Skin: warm, dry, appropriate color, no rash


Neuro: speech clear, oriented x 3, normal affect, responds appropriately to 

questions.





Course





- Re-evaluation


Re-evalutation: 





06/14/18 04:00


On repeat auscultation patient's wheezing and rhonchi are improving but not 

clear.  I will order albuterol treatment to help clear further.


06/14/18 05:39





Reevaluation patient's lung auscultation is improved significantly.  He looks 

well.  He has no distress.  Vital signs have normalized except for just a mild 

tachycardia that occurred after receiving albuterol treatment.  At this time I 

feel safe to be discharged home.  I feel that his chest pain is most likely 

related to the coughing and bronchitis.  I feel this way because it hurts when 

he coughs or takes deep breath and is very tender to palpate over the sternal 

area.  Will place on prednisone.  I will have him increase his Lantus dosage 

from 40 units at night to 50 units at night to help compensate for increases in 

his blood sugar.  I informed him his wife to close on his blood sugar to return 

to ER if it is recurrently high.  I encourage him return to ER immediately if 

he has fevers, difficulty breathing, worsening chest pain, or she feels unwell.

  Patient and wife agree with plan and he will be discharged home.





Dictation of this chart was performed using voice recognition software; 

therefore, there may be some unintended grammatical errors.





- Vital Signs


Vital signs: 


 











Temp Pulse Resp BP Pulse Ox


 


 97.6 F   84   19   122/84   97 


 


 06/14/18 02:46  06/14/18 02:46  06/14/18 03:07  06/14/18 02:46  06/14/18 03:07














- Laboratory


Result Diagrams: 


 06/14/18 03:31





 06/14/18 03:31


Laboratory results interpreted by me: 


 











  06/14/18 06/14/18





  03:31 03:31


 


RBC  3.83 L 


 


Hgb  11.1 L 


 


Hct  33.3 L 


 


RDW  15.3 H 


 


Sodium   127.6 L


 


Chloride   94 L


 


Carbon Dioxide   21 L


 


Glucose   149 H














- EKG Interpretation by Me


Additional EKG results interpreted by me: 





06/14/18 02:56


EKG is reviewed and interpreted by me.  EKG shows sinus rhythm with rate of 87 

bpm.  No ST segment elevation or depression.  No ischemic T-wave inversions.  

VA interval, QRS duration, QTc intervals are within normal range.  No old EKG 

available for comparison.





Discharge





- Discharge


Clinical Impression: 


 Bronchitis





Chest pain


Qualifiers:


 Chest pain type: unspecified Qualified Code(s): R07.9 - Chest pain, unspecified





Condition: Good


Disposition: HOME, SELF-CARE


Additional Instructions: 











BRONCHITIS WITH BRONCHOSPASM (WHEEZING):





     You have  bronchitis with bronchospasm (wheezing).  Sometimes people 

develop wheezing with a chest cold.  This occurs either because of an 

underlying tendency toward asthma or because the virus itself irritates the 

bronchial tubes.  This irritation causes cough, shortness of breath, and 

wheezing.


     Emergency treatment of bronchospasm may include adrenaline shots or 

bronchodilator aerosol.  You may feel lightheaded and have a rapid pulse for an 

hour or two.  Rest and get plenty of fluids.


     At home, we'll treat you with a bronchodilator inhaler. Corticosteroids 

may be required for some patients. Until you recover, avoid chemical fumes, 

dusts, pollens, and exercising in very cold or dry air. If you smoke, stop now!


     Most cases of bronchitis get better without antibiotics.  We prescribe 

antibiotics when we believe bacteria are damaging your airways, or if there's 

high risk the bronchitis will worsen into pneumonia. Increase your fluid 

intake. A cool mist humidifier may make your lungs more comfortable.  An 

expectorant (cough medicine that loosens phlegm) can help.


     Repeated episodes of bronchitis and bronchospasm may result in lung damage 

-- for example, chronic bronchitis, recurrent pneumonias, or emphysema. If you 

develop a fever, increased wheezing, chest pain, or severe shortness of breath, 

you should contact the doctor immediately.








INHALED BRONCHODILATORS:


     You have received a treatment of and/or prescription for an inhaled 

bronchodilator -- a medication which stimulates the airways in the lung to 

dilate.  This improves the flow of air in asthma, bronchitis, and emphysema.


     These medicines have some similarity to adrenaline, and can cause similar 

side effects:  shakiness, racing heart, and a sense of nervousness.  These side 

effects decrease with time.  Contact your doctor if these side effects are 

severe.


     Do not over-use the medicine.  Too-frequent use of the inhaler may make it 

ineffective.  Call your doctor if the inhaler is not controlling your symptoms 

at the prescribed doses.








STEROID MEDICATION:


     You have been given an injection of or oral medicine of the cortisone/

steroid class.  This medication is used to control inflammation or allergy.  

Dewayne t is usually only given for a short period of time, until the acute process 

subsides.


     There are usually no side effects from short-term use of cortisone-like 

medications.  Some persons feel an increased sense of well-being and are not 

sleepy at bedtime.  Long-term use of cortisone medications is best avoided, 

unless required for a severe condition.  If your condition does not remit, or 

relapses after the course of corticosteroid medication, you should consult your 

physician.











SMOKING:


     If you smoke, you should stop smoking.  The tar and chemicals in cigarette 

smoke are harmful.  Smoking has been shown to cause:


          emphysema


          chronic bronchitis


          lung cancer


          mouth and throat cancer


          stomach and pancreas cancer


          premature aging


          birth defects


     In addition, smoking increases ear and lung infections in children of 

smokers.








FOLLOW-UP CARE:


If you have been referred to a physician for follow-up care, call the physician

s office for an appointment as you were instructed or within the next two days.

  If you experience worsening or a significant change in your symptoms, notify 

the physician immediately or return to the Emergency Department at any time for 

re-evaluation.











The Prednisone may increase your blood sugar some; therefore, I want you to 

increase your Lantus dose to 50 Units at night. Please follow up with your 

doctor on Friday for reevaluation. Please return to the ER if you have fevers, 

blood sugar recurrently above 350, worsening chest pain, or difficulty 

breathing. Please use your Proventil inhaler as 2 puffs every 2-4 hours for 

wheezing. I have prescribed you a second inhaler in case you run out of your 

Proventil inhaler at home. 


Prescriptions: 


Albuterol Sulfate [Proair HFA Inhalation Aerosol 8.5 gm MDI] 2 puff IH Q4H PRN #

1 mdi


 PRN Reason: 


Prednisone 30 mg PO DAILY #12 tablet

## 2018-06-24 ENCOUNTER — HOSPITAL ENCOUNTER (EMERGENCY)
Dept: HOSPITAL 62 - ER | Age: 48
Discharge: HOME | End: 2018-06-24
Payer: MEDICAID

## 2018-06-24 VITALS — SYSTOLIC BLOOD PRESSURE: 140 MMHG | DIASTOLIC BLOOD PRESSURE: 82 MMHG

## 2018-06-24 DIAGNOSIS — L50.9: ICD-10-CM

## 2018-06-24 DIAGNOSIS — E10.9: ICD-10-CM

## 2018-06-24 DIAGNOSIS — I25.2: ICD-10-CM

## 2018-06-24 DIAGNOSIS — I25.10: ICD-10-CM

## 2018-06-24 DIAGNOSIS — T78.40XA: Primary | ICD-10-CM

## 2018-06-24 DIAGNOSIS — I10: ICD-10-CM

## 2018-06-24 DIAGNOSIS — J44.9: ICD-10-CM

## 2018-06-24 PROCEDURE — 99282 EMERGENCY DEPT VISIT SF MDM: CPT

## 2018-06-24 NOTE — ER DOCUMENT REPORT
ED General





- General


Chief Complaint: Rash


Stated Complaint: RASH


Time Seen by Provider: 06/24/18 21:26


Notes: 





Patient is a 48-year-old male who presents with diffuse rash.  Reports that 

this rash is over his chest, back, bilateral upper extremities and low 

posterior neck.  He states that started relatively abruptly several hours prior 

to arrival has been ongoing since that time.  He describes as being severely 

pruritic.  He has not training to improve his symptoms.  Nothing worsened his 

symptoms and he is uncertain what triggered this event.  He has not seen his 

general doctor regarding today's concerns.  He denies a history of similar 

symptoms in the past.  No known history of allergies.  He denies any difficulty 

breathing, nausea, vomiting or syncope.


TRAVEL OUTSIDE OF THE [place] IN LAST 30 DAYS: No





- Related Data


Allergies/Adverse Reactions: 


 





bee pollen [Bee Pollen] Allergy (Verified 09/11/17 03:01)


 


ketorolac [From Toradol] Allergy (Verified 09/11/17 03:01)


 











Past Medical History





- General


Information source: Patient, Relative





- Social History


Smoking Status: Never Smoker


Chew tobacco use (# tins/day): No


Frequency of alcohol use: None


Drug Abuse: None


Lives with: Family


Family History: Reviewed & Not Pertinent, COPD, DM


Patient has suicidal ideation: No


Patient has homicidal ideation: No





- Past Medical History


Cardiac Medical History: Reports: Hx Coronary Artery Disease, Hx Heart Attack, 

Hx Hypertension


Pulmonary Medical History: Reports: Hx Bronchitis, Hx COPD, Hx Pneumonia


Neurological Medical History: Reports: Hx Cerebrovascular Accident, Hx Migraine

, Hx Seizures


Endocrine Medical History: Reports: Hx Diabetes Mellitus Type 1


Renal/ Medical History: Reports: Hx Benign Prostatic Hyperplasia, Hx Kidney 

Stones.  Denies: Hx Peritoneal Dialysis


GI Medical History: Reports: Hx Gastroesophageal Reflux Disease, Hx Ulcer


Musculoskeltal Medical History: Reports Hx Arthritis


Psychiatric Medical History: Reports: Hx Anxiety - YES,CONTROLLED, Hx Attention 

Deficit Hyperactivity Disorder, Hx Depression, Hx Personality Disorder


Past Surgical History: Reports: Hx Open Heart Surgery - correction of heart 

murmur, Hx Orthopedic Surgery - partial left hip replacement, Hx Tonsillectomy





- Immunizations


Hx Diphtheria, Pertussis, Tetanus Vaccination: No - unk


Hx Pneumococcal Vaccination: 01/01/15





Review of Systems





- Review of Systems


Notes: 





Constitutional: Negative for fever.


HENT: Negative for sore throat.


Eyes: Negative for visual changes.


Cardiovascular: Negative for chest pain.


Respiratory: Negative for shortness of breath.


Gastrointestinal: Negative for abdominal pain, vomiting or diarrhea.


Genitourinary: Negative for dysuria.


Musculoskeletal: Negative for back pain.


Skin: Positive for rash.


Neurological: Negative for headaches, weakness or numbness.





10 point ROS negative except as marked above and in HPI.





Physical Exam





- Vital signs


Vitals: 


 











Temp Pulse Resp BP Pulse Ox


 


 97.7 F   98   17   136/83 H  100 


 


 06/24/18 21:03  06/24/18 21:03  06/24/18 21:03  06/24/18 21:03  06/24/18 21:03











Interpretation: Normal


Notes: 





PHYSICAL EXAMINATION:





GENERAL: Well-appearing, well-nourished and in no acute distress.





HEAD: Atraumatic, normocephalic.





EYES: Pupils equal round and reactive to light, extraocular movements intact, 

sclera anicteric, conjunctiva are normal.





ENT: nares patent, oropharynx clear without exudates.  Moist mucous membranes.





NECK: Normal range of motion, supple without lymphadenopathy





LUNGS: Breath sounds clear to auscultation bilaterally and equal.  No wheezes 

rales or rhonchi.





HEART: Regular rate and rhythm without murmurs





ABDOMEN: Soft, nontender, normoactive bowel sounds.  No guarding, no rebound.  

No masses appreciated.





EXTREMITIES: Normal range of motion, no pitting or edema.  No cyanosis.





NEUROLOGICAL: No focal neurological deficits. Moves all extremities 

spontaneously and on command.





PSYCH: Normal mood, normal affect.





SKIN: Warm, Dry, normal turgor, diffuse urticarial lesions over the chest, back 

and bilateral upper extremities





Course





- Re-evaluation


Re-evalutation: 





06/24/18 21:59


Patient presents with symptoms consistent with an allergic reaction without 

anaphylaxis.  Only cutaneous involvement with multiple areas of hives.  Vitals 

otherwise within normal limits at time of arrival.  No respiratory, GI, 

cardiovascular, or oral pharyngeal symptoms.  Patient has been started on a 

short course of prednisone and will recommend ongoing antihistamine therapy as 

an outpatient. At this time will discharge with return precautions and follow-

up recommendations.  Verbal discharge instructions given a the bedside and 

opportunity for questions given. Medication warnings reviewed. Patient is in 

agreement with this plan and has verbalized understanding of return precautions 

and the need for primary care follow-up in the next 24-72 hours.





- Vital Signs


Vital signs: 


 











Temp Pulse Resp BP Pulse Ox


 


 97.7 F   96   16   140/82 H  98 


 


 06/24/18 21:03  06/24/18 22:00  06/24/18 22:00  06/24/18 22:00  06/24/18 22:00














Discharge





- Discharge


Clinical Impression: 


 Urticaria





Allergic reaction


Qualifiers:


 Encounter type: initial encounter Qualified Code(s): T78.40XA - Allergy, 

unspecified, initial encounter





Condition: Good


Disposition: HOME, SELF-CARE


Additional Instructions: 


You were seen today for hives.  This can be either allergic, autoimmune, or 

environmental in origin.  You can continue to take cetirizine 10mg up to 3 

times daily as needed for itching.  Please also take the steroids as 

prescribed.  IF YOU DEVELOP DIFFICULTY BREATHING, SPREADING OF HIVES, VOMITING, 

LIGHTHEADEDNESS,  IMMEDIATELY AND CALL 911.  Please follow-up with your primary 

care physician in the next 1-2 days.


Prescriptions: 


Prednisone [Deltasone 20 mg Tablet] 3 tab PO DAILY 5 Days  tablet


Referrals: 


CRISTIAN BRINK MD [Primary Care Provider] - Follow up as needed

## 2018-07-09 ENCOUNTER — HOSPITAL ENCOUNTER (INPATIENT)
Dept: HOSPITAL 62 - ER | Age: 48
LOS: 4 days | Discharge: HOME | DRG: 690 | End: 2018-07-13
Attending: INTERNAL MEDICINE | Admitting: INTERNAL MEDICINE
Payer: MEDICAID

## 2018-07-09 DIAGNOSIS — I25.10: ICD-10-CM

## 2018-07-09 DIAGNOSIS — K21.9: ICD-10-CM

## 2018-07-09 DIAGNOSIS — E87.1: ICD-10-CM

## 2018-07-09 DIAGNOSIS — N31.8: ICD-10-CM

## 2018-07-09 DIAGNOSIS — I25.2: ICD-10-CM

## 2018-07-09 DIAGNOSIS — E03.9: ICD-10-CM
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
## 2018-07-09 DIAGNOSIS — I50.9: ICD-10-CM

## 2018-07-09 DIAGNOSIS — Z86.73: ICD-10-CM

## 2018-07-09 DIAGNOSIS — S01.81XA: ICD-10-CM

## 2018-07-09 DIAGNOSIS — D50.8: ICD-10-CM

## 2018-07-09 DIAGNOSIS — N39.0: Primary | ICD-10-CM

## 2018-07-09 DIAGNOSIS — E10.9: ICD-10-CM

## 2018-07-09 DIAGNOSIS — J44.9: ICD-10-CM

## 2018-07-09 DIAGNOSIS — R07.9: ICD-10-CM

## 2018-07-09 DIAGNOSIS — F31.9: ICD-10-CM

## 2018-07-09 DIAGNOSIS — R33.9: ICD-10-CM

## 2018-07-09 DIAGNOSIS — N40.0: ICD-10-CM

## 2018-07-09 DIAGNOSIS — G62.9: ICD-10-CM

## 2018-07-09 DIAGNOSIS — Q05.4: ICD-10-CM

## 2018-07-09 DIAGNOSIS — F17.210: ICD-10-CM
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
## 2018-07-09 DIAGNOSIS — I11.0: ICD-10-CM

## 2018-07-09 DIAGNOSIS — K56.7: ICD-10-CM

## 2018-07-09 LAB
ADD MANUAL DIFF: NO
ALBUMIN SERPL-MCNC: 3.8 G/DL (ref 3.5–5)
ALP SERPL-CCNC: 122 U/L (ref 38–126)
ALT SERPL-CCNC: 32 U/L (ref 21–72)
ANION GAP SERPL CALC-SCNC: 12 MMOL/L (ref 5–19)
ANION GAP SERPL CALC-SCNC: 12 MMOL/L (ref 5–19)
ANION GAP SERPL CALC-SCNC: 13 MMOL/L (ref 5–19)
ANION GAP SERPL CALC-SCNC: 13 MMOL/L (ref 5–19)
APPEARANCE UR: (no result)
APTT PPP: (no result) S
AST SERPL-CCNC: 23 U/L (ref 17–59)
BARBITURATES UR QL SCN: NEGATIVE
BASOPHILS # BLD AUTO: 0 10^3/UL (ref 0–0.2)
BASOPHILS NFR BLD AUTO: 0.5 % (ref 0–2)
BILIRUB DIRECT SERPL-MCNC: 0.3 MG/DL (ref 0–0.4)
BILIRUB SERPL-MCNC: 0.4 MG/DL (ref 0.2–1.3)
BILIRUB UR QL STRIP: NEGATIVE
BUN SERPL-MCNC: 9 MG/DL (ref 7–20)
CALCIUM: 8.5 MG/DL (ref 8.4–10.2)
CALCIUM: 8.7 MG/DL (ref 8.4–10.2)
CALCIUM: 8.8 MG/DL (ref 8.4–10.2)
CALCIUM: 8.8 MG/DL (ref 8.4–10.2)
CHLORIDE SERPL-SCNC: 81 MMOL/L (ref 98–107)
CHLORIDE SERPL-SCNC: 83 MMOL/L (ref 98–107)
CHLORIDE SERPL-SCNC: 83 MMOL/L (ref 98–107)
CHLORIDE SERPL-SCNC: 84 MMOL/L (ref 98–107)
CO2 SERPL-SCNC: 23 MMOL/L (ref 22–30)
CO2 SERPL-SCNC: 23 MMOL/L (ref 22–30)
CO2 SERPL-SCNC: 25 MMOL/L (ref 22–30)
CO2 SERPL-SCNC: 28 MMOL/L (ref 22–30)
CREAT UR-MCNC: 14.5 MG/DL (ref 22–328)
EOSINOPHIL # BLD AUTO: 0.1 10^3/UL (ref 0–0.6)
EOSINOPHIL NFR BLD AUTO: 1.6 % (ref 0–6)
ERYTHROCYTE [DISTWIDTH] IN BLOOD BY AUTOMATED COUNT: 15.3 % (ref 11.5–14)
GLUCOSE SERPL-MCNC: 128 MG/DL (ref 75–110)
GLUCOSE SERPL-MCNC: 139 MG/DL (ref 75–110)
GLUCOSE SERPL-MCNC: 174 MG/DL (ref 75–110)
GLUCOSE SERPL-MCNC: 175 MG/DL (ref 75–110)
GLUCOSE UR STRIP-MCNC: NEGATIVE MG/DL
HCT VFR BLD CALC: 32.5 % (ref 37.9–51)
HGB BLD-MCNC: 11.2 G/DL (ref 13.5–17)
KETONES UR STRIP-MCNC: NEGATIVE MG/DL
LYMPHOCYTES # BLD AUTO: 1.7 10^3/UL (ref 0.5–4.7)
LYMPHOCYTES NFR BLD AUTO: 20.6 % (ref 13–45)
MCH RBC QN AUTO: 29.7 PG (ref 27–33.4)
MCHC RBC AUTO-ENTMCNC: 34.5 G/DL (ref 32–36)
MCV RBC AUTO: 86 FL (ref 80–97)
METHADONE UR QL SCN: NEGATIVE
MONOCYTES # BLD AUTO: 0.9 10^3/UL (ref 0.1–1.4)
MONOCYTES NFR BLD AUTO: 10.2 % (ref 3–13)
NEUTROPHILS # BLD AUTO: 5.6 10^3/UL (ref 1.7–8.2)
NEUTS SEG NFR BLD AUTO: 67.1 % (ref 42–78)
NITRITE UR QL STRIP: NEGATIVE
OSMOLALITY,URINE: 211 MOSM/KG (ref 300–900)
PCP UR QL SCN: NEGATIVE
PH UR STRIP: 8 [PH] (ref 5–9)
PLATELET # BLD: 266 10^3/UL (ref 150–450)
POTASSIUM SERPL-SCNC: 3.7 MMOL/L (ref 3.6–5)
POTASSIUM SERPL-SCNC: 3.9 MMOL/L (ref 3.6–5)
POTASSIUM SERPL-SCNC: 4.4 MMOL/L (ref 3.6–5)
POTASSIUM SERPL-SCNC: 4.5 MMOL/L (ref 3.6–5)
PROT SERPL-MCNC: 6.7 G/DL (ref 6.3–8.2)
PROT UR STRIP-MCNC: NEGATIVE MG/DL
RBC # BLD AUTO: 3.78 10^6/UL (ref 4.35–5.55)
SODIUM SERPL-SCNC: 118.3 MMOL/L (ref 137–145)
SODIUM SERPL-SCNC: 119.6 MMOL/L (ref 137–145)
SODIUM SERPL-SCNC: 120.6 MMOL/L (ref 137–145)
SODIUM SERPL-SCNC: 121.1 MMOL/L (ref 137–145)
SP GR UR STRIP: 1.01
TOTAL CELLS COUNTED % (AUTO): 100 %
URINE AMPHETAMINES SCREEN: NEGATIVE
URINE BENZODIAZEPINES SCREEN: NEGATIVE
URINE COCAINE SCREEN: NEGATIVE
URINE MARIJUANA (THC) SCREEN: NEGATIVE
URINE SODIUM: 61 MMOL/L (ref 30–90)
UROBILINOGEN UR-MCNC: NEGATIVE MG/DL (ref ?–2)
WBC # BLD AUTO: 8.4 10^3/UL (ref 4–10.5)

## 2018-07-09 PROCEDURE — 83930 ASSAY OF BLOOD OSMOLALITY: CPT

## 2018-07-09 PROCEDURE — 70450 CT HEAD/BRAIN W/O DYE: CPT

## 2018-07-09 PROCEDURE — 82962 GLUCOSE BLOOD TEST: CPT

## 2018-07-09 PROCEDURE — 80053 COMPREHEN METABOLIC PANEL: CPT

## 2018-07-09 PROCEDURE — 36415 COLL VENOUS BLD VENIPUNCTURE: CPT

## 2018-07-09 PROCEDURE — 84484 ASSAY OF TROPONIN QUANT: CPT

## 2018-07-09 PROCEDURE — 83935 ASSAY OF URINE OSMOLALITY: CPT

## 2018-07-09 PROCEDURE — 87086 URINE CULTURE/COLONY COUNT: CPT

## 2018-07-09 PROCEDURE — 93306 TTE W/DOPPLER COMPLETE: CPT

## 2018-07-09 PROCEDURE — 71045 X-RAY EXAM CHEST 1 VIEW: CPT

## 2018-07-09 PROCEDURE — 74176 CT ABD & PELVIS W/O CONTRAST: CPT

## 2018-07-09 PROCEDURE — 74022 RADEX COMPL AQT ABD SERIES: CPT

## 2018-07-09 PROCEDURE — 94640 AIRWAY INHALATION TREATMENT: CPT

## 2018-07-09 PROCEDURE — 85027 COMPLETE CBC AUTOMATED: CPT

## 2018-07-09 PROCEDURE — 82570 ASSAY OF URINE CREATININE: CPT

## 2018-07-09 PROCEDURE — 81001 URINALYSIS AUTO W/SCOPE: CPT

## 2018-07-09 PROCEDURE — 80061 LIPID PANEL: CPT

## 2018-07-09 PROCEDURE — 84300 ASSAY OF URINE SODIUM: CPT

## 2018-07-09 PROCEDURE — 84443 ASSAY THYROID STIM HORMONE: CPT

## 2018-07-09 PROCEDURE — 93005 ELECTROCARDIOGRAM TRACING: CPT

## 2018-07-09 PROCEDURE — 93010 ELECTROCARDIOGRAM REPORT: CPT

## 2018-07-09 PROCEDURE — G0378 HOSPITAL OBSERVATION PER HR: HCPCS

## 2018-07-09 PROCEDURE — 80307 DRUG TEST PRSMV CHEM ANLYZR: CPT

## 2018-07-09 PROCEDURE — 85025 COMPLETE CBC W/AUTO DIFF WBC: CPT

## 2018-07-09 PROCEDURE — 80048 BASIC METABOLIC PNL TOTAL CA: CPT

## 2018-07-09 PROCEDURE — 71275 CT ANGIOGRAPHY CHEST: CPT

## 2018-07-09 PROCEDURE — 99285 EMERGENCY DEPT VISIT HI MDM: CPT

## 2018-07-09 RX ADMIN — INSULIN LISPRO PRN UNIT: 100 INJECTION, SOLUTION INTRAVENOUS; SUBCUTANEOUS at 14:26

## 2018-07-09 RX ADMIN — Medication SCH ML: at 13:50

## 2018-07-09 RX ADMIN — ACETAMINOPHEN PRN MG: 325 TABLET ORAL at 20:38

## 2018-07-09 RX ADMIN — LANSOPRAZOLE SCH MG: 15 TABLET, ORALLY DISINTEGRATING, DELAYED RELEASE ORAL at 18:02

## 2018-07-09 RX ADMIN — BENZTROPINE MESYLATE SCH MG: 1 TABLET ORAL at 18:01

## 2018-07-09 RX ADMIN — BUSPIRONE HYDROCHLORIDE SCH MG: 10 TABLET ORAL at 23:41

## 2018-07-09 RX ADMIN — FLUOXETINE SCH MG: 20 CAPSULE ORAL at 23:41

## 2018-07-09 RX ADMIN — Medication SCH ML: at 21:40

## 2018-07-09 RX ADMIN — INSULIN GLARGINE SCH UNIT: 100 INJECTION, SOLUTION SUBCUTANEOUS at 23:41

## 2018-07-09 RX ADMIN — ASPIRIN SCH MG: 325 TABLET, DELAYED RELEASE ORAL at 13:56

## 2018-07-09 NOTE — ER DOCUMENT REPORT
ED General





- General


Chief Complaint: Chest Pain


Stated Complaint: CHEST PAIN


Time Seen by Provider: 07/09/18 04:03


Notes: 


Patient is a 48-year-old male who presents with complaint of chest pain.  

Apparently has a history of previous MI in 2005; however, they do not know 

exactly cause heart attack the patient had a clean heart cath without any 

obstruction.  Patient has history of recurrent angina.  He last had a stress 

test in February.  This was normal.  He is followed by cardiology down in 

[place].  Patient had chest pain that felt like a heaviness on his chest 

with some pain going into his left arm and neck today.  No fevers.  No 

vomiting.  No difficulty breathing.  No other complaints at this time.  Says 

pain is starting to improve some.  He did take Aggrenox at home just prior to 

coming in.





TRAVEL OUTSIDE OF THE [place] IN LAST 30 DAYS: No





- Related Data


Allergies/Adverse Reactions: 


 





bee pollen [Bee Pollen] Allergy (Verified 09/11/17 03:01)


 


ketorolac [From Toradol] Allergy (Verified 09/11/17 03:01)


 











Past Medical History





- Social History


Smoking Status: Former Smoker


Cigarette use (# per day): No - still uses e-cigarettes


Frequency of alcohol use: None


Drug Abuse: None


Family History: Reviewed & Not Pertinent, COPD, DM





- Past Medical History


Cardiac Medical History: Reports: Hx Coronary Artery Disease, Hx Heart Attack, 

Hx Hypertension


Pulmonary Medical History: Reports: Hx Bronchitis, Hx COPD, Hx Pneumonia


Neurological Medical History: Reports: Hx Cerebrovascular Accident, Hx Migraine

, Hx Seizures


Endocrine Medical History: Reports: Hx Diabetes Mellitus Type 1


Renal/ Medical History: Reports: Hx Benign Prostatic Hyperplasia, Hx Kidney 

Stones.  Denies: Hx Peritoneal Dialysis


GI Medical History: Reports: Hx Gastroesophageal Reflux Disease, Hx Ulcer


Musculoskeltal Medical History: Reports Hx Arthritis


Psychiatric Medical History: Reports: Hx Anxiety - YES,CONTROLLED, Hx Attention 

Deficit Hyperactivity Disorder, Hx Depression, Hx Personality Disorder


Past Surgical History: Reports: Hx Open Heart Surgery - correction of heart 

murmur, Hx Orthopedic Surgery - partial left hip replacement, Hx Tonsillectomy





- Immunizations


Hx Diphtheria, Pertussis, Tetanus Vaccination: No - unk


Hx Pneumococcal Vaccination: 01/01/15





Review of Systems





- Review of Systems


Notes: 





My Normal Review Basic





REVIEW OF SYSTEMS:


CONSTITUTIONAL :  Denies fever,  chills, or sweats.  Denies recent illness.


EENT:   Denies eye, ear, throat, or mouth pain or symptoms.  Denies nasal or 

sinus congestion.


CARDIOVASCULAR: Tightness


RESPIRATORY:  Denies cough, cold, or chest congestion.  Denies shortness of 

breath, difficulty breathing, or wheezing.


GASTROINTESTINAL:  Denies abdominal pain.  Denies nausea, vomiting, or 

diarrhea. 


MUSCULOSKELETAL:  Denies neck or back pain or joint pain or swelling.


SKIN:   Denies rash or skin lesions.


NEUROLOGICAL:  Denies altered mental status or loss of consciousness.  Denies 

headache.  Denies weakness or paralysis or loss of use of either side.  Denies 

problems with gait or speech.  Denies sensory or motor loss.


ALL OTHER SYSTEMS REVIEWED AND NEGATIVE.





Physical Exam





- Vital signs


Vitals: 


 











Pulse Resp BP Pulse Ox


 


 91   18   150/86 H  100 


 


 07/09/18 03:45  07/09/18 03:45  07/09/18 03:45  07/09/18 03:45














- Notes


Notes: 





General Appearance: Well nourished, alert, cooperative, no acute distress, no 

obvious discomfort.


Vitals: reviewed, See vital signs table.


Head: no swelling or tenderness to the head


Eyes: PERRL, EOMI, Conjuctiva clear


Mouth: No decreasd moisture


Neck: Supple, no neck tenderness, No thyromegaly


Chest wall: Some pain palpation of the chest wall which patient says is chronic.


Lungs: No wheezing, No rales, No rhonci, No accessory muscle use, good air 

exchange bilaterally.


Heart: Normal rate, Regular rythm, No murmur, no rub


Abdomen: Normal BS, soft, No rigidity, No abdominal tenderness, No guarding, no 

rebound, no abdominal masses, no organomegaly


Extremities: strength 5/5 in all extremities, good pulses in all extremities, 

no swelling or tenderness in the extremities, no edema.


Skin: warm, dry, appropriate color, no rash


Neuro: speech clear, oriented x 3, normal affect, responds appropriately to 

questions.





Course





- Re-evaluation


Re-evalutation: 





07/12/18 06:14


Due to patient's history and concerning presentation of his chest pain I think 

is appropriate to admit the patient for chest pain workup.  Usually when I take 

care of his chest pain is more respiratory related and that is related to 

wheezing cough and congestion.  This time he had sensation of heaviness on his 

chest with pain rating to his jaw and arm which is not typical for his 

presentation.  This is more typical that of cardiac disease.  Also patient has 

significant hyponatremia that is much worse than it has been in the past.  

Patient says his last sodium was 128 and that was just a few days ago.  At this 

time I feel the patient is appropriate for admission.  I did speak with the 

nurse practitioner covering hospitalist service.  She agrees to evaluate the 

patient for admission.





Dictation of this chart was performed using voice recognition software; 

therefore, there may be some unintended grammatical errors.





- Vital Signs


Vital signs: 


 











Temp Pulse Resp BP Pulse Ox


 


 98.4 F   85   22 H  114/80   95 


 


 07/11/18 19:29  07/12/18 02:00  07/11/18 19:29  07/11/18 19:29  07/11/18 19:29














- Laboratory


Result Diagrams: 


 07/11/18 08:36





 07/11/18 08:36


Laboratory results interpreted by me: 


 











  07/09/18 07/09/18 07/09/18





  04:14 04:14 04:14


 


RBC  3.78 L  


 


Hgb  11.2 L  


 


Hct  32.5 L  


 


RDW  15.3 H  


 


Sodium   120.6 L* 


 


Chloride   81 L 


 


Glucose   174 H 


 


POC Glucose   


 


Serum Osmolality    249 L


 


TSH   


 


Urine Blood   


 


Ur Leukocyte Esterase   


 


Urine Osmolality   


 


Urine Creatinine   














  07/09/18 07/09/18 07/09/18





  04:14 10:47 10:47


 


RBC   


 


Hgb   


 


Hct   


 


RDW   


 


Sodium   


 


Chloride   


 


Glucose   


 


POC Glucose   


 


Serum Osmolality   


 


TSH  4.89 H  


 


Urine Blood    SMALL H


 


Ur Leukocyte Esterase    LARGE H


 


Urine Osmolality   211 L 


 


Urine Creatinine   14.5 L 














  07/09/18 07/09/18 07/09/18





  11:43 11:56 15:08


 


RBC   


 


Hgb   


 


Hct   


 


RDW   


 


Sodium  118.3 L*   119.6 L*


 


Chloride  83 L   84 L


 


Glucose  175 H   139 H


 


POC Glucose   187 H 


 


Serum Osmolality   


 


TSH   


 


Urine Blood   


 


Ur Leukocyte Esterase   


 


Urine Osmolality   


 


Urine Creatinine   














  07/09/18 07/09/18 07/09/18





  17:33 17:40 22:51


 


RBC   


 


Hgb   


 


Hct   


 


RDW   


 


Sodium   121.1 L 


 


Chloride   83 L 


 


Glucose   128 H 


 


POC Glucose  136 H   132 H


 


Serum Osmolality   


 


TSH   


 


Urine Blood   


 


Ur Leukocyte Esterase   


 


Urine Osmolality   


 


Urine Creatinine   














  07/10/18 07/10/18 07/10/18





  04:15 04:15 05:14


 


RBC  3.64 L  


 


Hgb  10.9 L  


 


Hct  31.5 L  


 


RDW  15.2 H  


 


Sodium   123.4 L 


 


Chloride   89 L 


 


Glucose   169 H 


 


POC Glucose    185 H


 


Serum Osmolality   


 


TSH   


 


Urine Blood   


 


Ur Leukocyte Esterase   


 


Urine Osmolality   


 


Urine Creatinine   














  07/10/18 07/10/18





  12:17 12:18


 


RBC  


 


Hgb  


 


Hct  


 


RDW  


 


Sodium   124.8 L


 


Chloride   90 L


 


Glucose   176 H


 


POC Glucose  185 H 


 


Serum Osmolality  


 


TSH  


 


Urine Blood  


 


Ur Leukocyte Esterase  


 


Urine Osmolality  


 


Urine Creatinine  














- EKG Interpretation by Me


Additional EKG results interpreted by me: 





07/09/18 04:04


EKG is reviewed and interpreted by me.  EKG shows sinus rhythm with rate of 80 

bpm.  No ST segment elevation or depression.  No ischemic T-wave inversions.  

OH interval, QRS duration are within normal range.  QTc interval is prolonged.





Discharge





- Discharge


Clinical Impression: 


 Hyponatremia





Chest pain


Qualifiers:


 Chest pain type: unspecified Qualified Code(s): R07.9 - Chest pain, unspecified





Condition: Stable


Disposition: ADMITTED AS OBSERVATION


Admitting Provider: Hospitalist


Unit Admitted: Telemetry

## 2018-07-09 NOTE — RADIOLOGY REPORT (SQ)
EXAM DESCRIPTION:  CTA CHEST



COMPLETED DATE/TIME:  7/9/2018 9:50 am



REASON FOR STUDY:  chest pin; please time to eval thoracic aneurysm



COMPARISON:  None.



TECHNIQUE:  CT scan of the chest performed using helical scanning technique with dynamic intravenous 
contrast injection.  Images reviewed with lung, soft tissue and bone windows.  Reconstructed coronal 
and sagittal MPR images reviewed.

Additional 3 dimensional post-processing performed to develop Maximal Intensity Projection images (MI
P).  All images stored on PACS.

All CT scanners at this facility use dose modulation, iterative reconstruction, and/or weight based d
osing when appropriate to reduce radiation dose to as low as reasonably achievable (ALARA).

[facility]: Dose Right  [facility]: CareDose    [facility]: Dose Right    [facility]: Teradose 4D    [facility]: Smart Technologies



CONTRAST TYPE AND DOSE:  contrast/concentration: Isovue 370.00 mg/ml; Total Contrast Delivered: 48.0 
ml; Total Saline Delivered: 70.0 ml

Contrast bolus optimized for the thoracic aorta.



RENAL FUNCTION:  Creatinine 0.76



RADIATION DOSE:  CT Rad equipment meets quality standard of care and radiation dose reduction techniq
ues were employed. CTDIvol: 14.3 - 16.5 mGy. DLP: 503 mGy-cm. .



LIMITATIONS:  None.



FINDINGS:  LUNGS AND PLEURA: There is patchy alveolar infiltrate in the right upper lobe, superior se
gment right lower lobe and just above the left hemidiaphragm, edema versus pneumonia.

Thickened interlobular septa are present with patchy ground-glass opacity from mild alveolar and inte
rstitial edema.

No pleural effusions.  No pneumothorax.  No pulmonary nodules.

AORTA AND GREAT VESSELS: No thoracic aortic aneurysm.  No thoracic aortic dissection.

HEART: No pericardial effusion. No significant coronary artery calcifications.

PULMONARY ARTERIES: Contrast bolus not optimized for pulmonary arteries.

HILAR AND MEDIASTINAL STRUCTURES: No identified masses or abnormal nodes.

HARDWARE: None in the chest.

UPPER ABDOMEN: No significant findings.  Limited exam.

THYROID AND OTHER SOFT TISSUES: No masses.  No adenopathy.

BONES: Old right lateral 6th and 9th rib fractures.  Old left posterior 4th rib fracture.  Old left l
ateral 5th 6th and 7th rib fractures.

3D MIPS: Confirm above findings.

OTHER: There is circumferential distal esophageal wall thickening and luminal narrowing.  This could 
be due to reflux esophagitis.  Sandhu's esophagus could not be excluded.



IMPRESSION:  No CT angio evidence of thoracic aortic dissection.  No thoracic aortic aneurysm.

Circumferential distal esophageal wall thickening, question reflux esophagitis.  Sandhu's esophagus 
or esophageal neoplasm could not be excluded.

Old bilateral rib fractures.



COMMENT:  Quality ID # [id number]: Final reports with documentation of one or more dose reduction techniques
 (e.g., Automated exposure control, adjustment of the mA and/or kV according to patient size, use of 
iterative reconstruction technique)



TECHNICAL DOCUMENTATION:  JOB ID:  [id number]

 2011 [employer]- All Rights Reserved



Reading location - IP/workstation name: [facility]

## 2018-07-09 NOTE — RADIOLOGY REPORT (SQ)
Clinical History : chest pain ,



Exam : Portable AP view of the chest 7/9/2018 4:18 AM CDT



Comparisons : Portable AP view of the chest June 14, 2018



Findings :

 





The lungs are clear without focal consolidation or pleural

effusion.



The heart is normal in size.  The mediastinal contours are normal

in appearance.



The thoracic spine is age appropriate. There is stable deformity

of the left chest wall laterally. The shoulders are unremarkable.



Limited evaluation of the upper abdomen demonstrates no gross

abnormalities.



Impression:



No acute cardiopulmonary disease (stable appearing chest).

## 2018-07-09 NOTE — PDOC H&P
History of Present Illness


Admission Date/PCP: 


  18 08:18





  CRISTIAN BRINK MD





Patient complains of: Chest pain


History of Present Illness: 


TARA SCOTT is a 48 year old male with an extensive past medical history 

significant for MI without stents, CVA without deficits, hypertension, insulin-

dependent diabetes mellitus, hydrocephalus, chronic pain, bipolar and 

personality disorders who presented to the emergency department today with a 

complaint of chest pain that woke him from sleep radiating to his neck and left 

shoulder associated with dizziness and nausea.  The patient attempted to sleep 

throughout but the pain worsened and so he presented to the emergency 

department where his pain was relieved with 1 tab of nitroglycerin.  He reports 

that following nitroglycerin, the chest pain resolved but he then noted a 

pulsating discomfort to his back between his shoulder blades.


Evaluation in the emergency department revealed stable vital signs, EKG 

demonstrated normal sinus rhythm, chest x-ray was benign, and an unremarkable 

laboratory evaluation other than hyponatremia with a sodium of 120.6.  Initial 

troponin was negative.  The patient had paperwork for most recent appointment 

at his neurologist showing his sodium 1 week ago was 128.  The patient's 

significant other does report that the patient's Trileptal was doubled at that 

time; unclear if the current dose is.  There were no other medication changes 

made.


He is referred to the hospitalist service for admission and management of 

hyponatremia and chest pain rule out.





Past Medical History


Cardiac Medical History: Reports: Congestive Heart Failure, Coronary Artery 

Disease, Myocardial Infarction, Hypertension


Pulmonary Medical History: Reports: Bronchitis, Chronic Obstructive Pulmonary 

Disease (COPD), Pneumonia


EENT Medical History: Reports: None


Neurological Medical History: Reports: Migraine, Seizures, Other - Hydrocephalus

, spina bifida


Endocrine Medical History: Reports: Diabetes Mellitus Type 1


Renal/ Medical History: Reports: None


Malignancy Medical History: Reports: None


GI Medical History: Reports: Gastroesophageal Reflux Disease


Musculoskeltal Medical History: Reports: Arthritis


Psychiatric Medical History: Reports: Attention Deficit Hyperactivity Disorder, 

Depression, Personality Disorder, Tobacco Dependency


Hematology: Reports: Anemia


Infectious Medical History: Reports: None





Past Surgical History


Past Surgical History: Reports: Orthopedic Surgery - Bilateral hip, 

Tonsillectomy, Other - Ventral septal defect repair as infant





Social History


Information Source: Patient


Lives with: Spouse/Significant other


Smoking Status: Current Every Day Smoker


Cigarettes Packs Per Day: 1 - Currently Vaps


Number of Years Smokin


Frequency of Alcohol Use: Rare


Hx Recreational Drug Use: Yes


Drugs: Cocaine


Hx Prescription Drug Abuse: No





- Advance Directive


Resuscitation Status: Full Code


Surrogate healthcare decision maker:: 


The patient's significant other, Michaela Reeves, [phone]








Family History


Family History: COPD, DM, Hypertension


Parental Family History Reviewed: Yes


Children Family History Reviewed: Yes


Sibling(s) Family History Reviewed.: Yes





Medication/Allergy


Home Medications: 








Baclofen [Baclofen 10 mg Tablet] 10 mg PO BIDP PRN 18 


Benztropine Mesylate [Cogentin 1 mg Tablet] 1 mg PO BID 18 


Buspirone HCl [Buspar 10 mg Tablet] 5 mg PO Q12 18 


Ergocalciferol (Vitamin D2) [Drisdol 50,000 unit (1.25MG) Capsule] 50,000 unit 

PO SA@1000 18 


Esomeprazole Mag Trihydrate [Nexium] 40 mg PO DAILY 18 


Fluoxetine HCl [Prozac] 20 mg PO DAILY 18 


Fluoxetine HCl [Prozac] 40 mg PO Q12 18 


Insulin Aspart [Novolog Flexpen] 6 units SQ MEALS 18 


Insulin Glargine,Hum.rec.anlog [Lantus Solostar] 15 units SQ QHS 18 


Lisinopril [Prinivil 2.5 mg Tablet] 2.5 mg PO DAILY 18 


Metoprolol Succinate [Toprol Xl 25 mg Tab.sr] 12.5 mg PO DAILY 18 


Olanzapine [Zyprexa] 15 mg PO QHS 18 


Oxcarbazepine [Trileptal] 600 mg PO BID 18 


Potassium Chloride [Klor-Con 10 Meq Capsule ER] 10 meq PO DAILY 18 


Simvastatin [Zocor 40 mg Tablet] 40 mg PO QHS 18 


Tiotropium Bromide [Spiriva Handihaler 18 mcg/dose (30 Dose)] 1 cap IH DAILY  








Allergies/Adverse Reactions: 


 





bee pollen [Bee Pollen] Allergy (Verified 17 03:01)


 


ketorolac [From Toradol] Allergy (Verified 17 03:01)


 











Review of Systems


Constitutional: PRESENT: fatigue.  ABSENT: chills, fever(s), headache(s), 

weight gain, weight loss


Eyes: ABSENT: visual disturbances


Ears: ABSENT: hearing changes


Cardiovascular: PRESENT: chest pain.  ABSENT: dyspnea on exertion, edema, 

orthropnea, palpitations


Respiratory: ABSENT: cough, hemoptysis


Gastrointestinal: PRESENT: nausea.  ABSENT: abdominal pain, constipation, 

diarrhea, hematemesis, hematochezia, vomiting


Genitourinary: ABSENT: dysuria, hematuria


Musculoskeletal: ABSENT: joint swelling


Integumentary: ABSENT: rash, wounds


Neurological: PRESENT: vertigo.  ABSENT: abnormal gait, abnormal speech, 

confusion, dizziness, focal weakness, syncope


Psychiatric: ABSENT: anxiety, depression, homidical ideation, suicidal ideation


Endocrine: ABSENT: cold intolerance, heat intolerance, polydipsia, polyuria


Hematologic/Lymphatic: ABSENT: easy bleeding, easy bruising





Physical Exam


Vital Signs: 


 











Temp Pulse Resp BP Pulse Ox


 


 97.9 F   84   17   151/88 H  99 


 


 18 15:30  18 15:30  18 15:30  18 15:30  18 15:30








 Intake & Output











 07/08/18 07/09/18 07/10/18





 06:59 06:59 06:59


 


Intake Total   266


 


Output Total   1600


 


Balance   -1334











General appearance: PRESENT: no acute distress, disheveled, well-developed, well

-nourished


Head exam: PRESENT: atraumatic, normocephalic


Eye exam: PRESENT: conjunctiva pink, EOMI, PERRLA.  ABSENT: scleral icterus


Ear exam: PRESENT: normal external ear exam


Mouth exam: PRESENT: moist, tongue midline


Teeth exam: PRESENT: poor dentation


Neck exam: ABSENT: carotid bruit, JVD, lymphadenopathy, thyromegaly


Respiratory exam: PRESENT: clear to auscultation david, symmetrical, unlabored.  

ABSENT: rales, rhonchi, wheezes


Cardiovascular exam: PRESENT: RRR, +S1, +S2.  ABSENT: diastolic murmur, rubs, 

systolic murmur


Pulses: PRESENT: normal dorsalis pedis pul


Vascular exam: PRESENT: normal capillary refill


GI/Abdominal exam: PRESENT: distended, normal bowel sounds, soft.  ABSENT: 

guarding, mass, organolmegaly, rebound, tenderness


Rectal exam: PRESENT: deferred


Extremities exam: PRESENT: full ROM.  ABSENT: calf tenderness, clubbing, pedal 

edema


Neurological exam: PRESENT: alert, awake, oriented to person, oriented to place

, oriented to time, oriented to situation, CN II-XII grossly intact.  ABSENT: 

motor sensory deficit


Psychiatric exam: PRESENT: normal mood, unusual affect, other - Labile mood.  

ABSENT: homicidal ideation, suicidal ideation


Skin exam: PRESENT: dry, intact, warm.  ABSENT: cyanosis, rash





Results


Laboratory Results: 


 





 18 15:08 





 











  18





  10:47 10:47 11:43


 


Sodium    118.3 L*


 


Potassium    4.5


 


Chloride    83 L


 


Carbon Dioxide    23


 


Anion Gap    12


 


BUN    9


 


Creatinine    0.70


 


Est GFR ( Amer)    > 60


 


Est GFR (Non-Af Amer)    > 60


 


Glucose    175 H


 


Calcium    8.5


 


Urine Color   STRAW 


 


Urine Appearance   SLIGHTLY-CLOUDY 


 


Urine pH   8.0 


 


Ur Specific Gravity   1.013 


 


Urine Protein   NEGATIVE 


 


Urine Glucose (UA)   NEGATIVE 


 


Urine Ketones   NEGATIVE 


 


Urine Blood   SMALL H 


 


Urine Nitrite   NEGATIVE 


 


Ur Leukocyte Esterase   LARGE H 


 


Urine WBC (Auto)   45 


 


Urine RBC (Auto)   8 


 


Urine Osmolality  211 L  














  18





  15:08


 


Sodium  119.6 L*


 


Potassium  4.4


 


Chloride  84 L


 


Carbon Dioxide  23


 


Anion Gap  13


 


BUN  9


 


Creatinine  0.64


 


Est GFR ( Amer)  > 60


 


Est GFR (Non-Af Amer)  > 60


 


Glucose  139 H


 


Calcium  8.8


 


Urine Color 


 


Urine Appearance 


 


Urine pH 


 


Ur Specific Gravity 


 


Urine Protein 


 


Urine Glucose (UA) 


 


Urine Ketones 


 


Urine Blood 


 


Urine Nitrite 


 


Ur Leukocyte Esterase 


 


Urine WBC (Auto) 


 


Urine RBC (Auto) 


 


Urine Osmolality 








 











  18





  11:43 15:08


 


Troponin I  < 0.012  < 0.012











Impressions: 


 





Chest/Abdomen CTA  18 08:57


IMPRESSION:  No CT angio evidence of thoracic aortic dissection.  No thoracic 

aortic aneurysm.


Circumferential distal esophageal wall thickening, question reflux esophagitis.

  Sandhu's esophagus or esophageal neoplasm could not be excluded.


Old bilateral rib fractures.


 














Assessment & Plan





- Diagnosis


(1) Chest pain


Qualifiers: 


   Chest pain type: unspecified   Qualified Code(s): R07.9 - Chest pain, 

unspecified   


Is this a current diagnosis for this admission?: Yes   


Plan: 


The patient presented to the emergency department with crushing chest pain 

radiating to his neck and arm with uncomfortable pulsating sensation to his mid 

back.  He has multiple risk factors including previous MI, hypertension, 

diabetes, tobacco use.


EKG demonstrated normal sinus rhythm without ST segment elevation or depression.


Chest x-ray is benign.


Initial troponin was negative.


Reviewed cardiac stress test done at the patient's outpatient cardiologist, Dr. Montana, in 2019: Post stress LVEF 35%, small anterior apical 

nonreversible area of ischemia secondary to scar noted, left ventricle 

hypokinesis.


CTA of the chest ruled out thoracic aneurysm.





The patient is admitted to the medical floor on continuous cardiac telemetry.


We will trend serial troponins.


We will evaluate TSH.


Continue PPI.


We will obtain echocardiogram.  


Nitroglycerin tabs 3 as needed for chest pain; IV morphine for unrelieved 

chest pain.








(2) Hyponatremia


Is this a current diagnosis for this admission?: Yes   


Plan: 


Patient with sodium of 120 on admission today; baseline hyponatremia of 127.  

This likely secondary to numerous medications contributing to hyponatremia.  

Most notably, the patient's Trileptal was increased 1 week ago.





He will be placed on IV maintenance fluids for correction.


We will trend serial chemistries.


Seizure precautions.


Holding Trileptal.








(3) Anemia


Qualifiers: 


   Anemia type: iron deficiency   Iron deficiency anemia type: inadequate 

dietary iron intake   Qualified Code(s): D50.8 - Other iron deficiency anemias 

  


Is this a current diagnosis for this admission?: Yes   


Plan: 


Chronic anemia; at baseline hemoglobin.


No evidence of ongoing bleeding.  Vital signs are stable.





Place patient on multivitamin with iron.








(4) COPD (chronic obstructive pulmonary disease)


Is this a current diagnosis for this admission?: Yes   


Plan: 


Stable; without exacerbation.





Continue the patient's home dose Spiriva.


As needed nebulizer treatments.








(5) Diabetes


Qualifiers: 


   Diabetes mellitus type: type 2   Diabetes mellitus long term insulin use: 

with long term use   Diabetes mellitus complication status: with kidney 

complications   Diabetes mellitus complication detail: with chronic kidney 

disease   Chronic kidney disease stage: stage 2 (mild)   Qualified Code(s): 

E11.22 - Type 2 diabetes mellitus with diabetic chronic kidney disease; N18.2 - 

Chronic kidney disease, stage 2 (mild); N18.2 - Chronic kidney disease, stage 2 

(mild); Z79.4 - Long term (current) use of insulin; Z79.4 - Long term (current) 

use of insulin; Z79.4 - Long term (current) use of insulin; Z79.4 - Long term (

current) use of insulin   


Is this a current diagnosis for this admission?: Yes   


Plan: 


The patient is placed on a consistent carb diet.


Accu-Cheks before meals and at bedtime with Humalog for sliding scale coverage.


Continue the patient's home dose Lantus.








(6) Hypertension


Qualifiers: 


   Hypertension type: essential hypertension   Qualified Code(s): I10 - 

Essential (primary) hypertension   


Is this a current diagnosis for this admission?: Yes   


Plan: 


Continue patient's home dose medication; metoprolol and lisinopril








(7) Hypothyroidism


Is this a current diagnosis for this admission?: Yes   


Plan: 


TSH is high; 4.89.


Per patient and significant other, he has not been told that he has 

hypothyroidism.





Initiate levothyroxine 25 mcg daily.








(8) Bipolar 1 disorder


Is this a current diagnosis for this admission?: Yes   


Plan: 


We will continue the patient's home medications; BuSpar, Cogentin, Prozac, and 

Zyprexa.








(9) Neuropathic pain


Is this a current diagnosis for this admission?: Yes   


Plan: 


Continue the patient's home dose baclofen.


Holding baclofen secondary to hyponatremia.








(10) Nicotine abuse


Is this a current diagnosis for this admission?: Yes   


Plan: 


Previous smoker with 30-pack-year history.  Now the high-dose nicotine.


Nicotine cessation is encouraged.  NicoDerm patch is provided.








- Time


Time Spent: Greater than 70 Minutes


Smoking Cessation Education: 3 to 10 minutes


Medications reviewed and adjusted accordingly: Yes

## 2018-07-09 NOTE — XCELERA REPORT
[address] [address]

                             Tel: [phone]

                             Fax: [phone]



                    Transthoracic Echocardiogram Report

____________________________________________________________________________



Name: TARA SCOTT

MRN: [id number]                Age: 48 yrs

Gender: Male                   : 1970

Patient Status: Inpatient      Patient Location: [address] [place] [address]

Account #: [id number]

Study Date: 2018 11:08 AM

Accession #: [id number]

____________________________________________________________________________





____________________________________________________________________________



Procedure: A two-dimensional transthoracic echocardiogram with color flow

and Doppler was performed. The study was technically difficult with many

images being suboptimal in quality. The study was technically limited with

all images being suboptimal in quality.

Reason For Study: Chest pain, dyspnea, hx MI and CHF



History: Chest pain, dyspnea, hx MI and CHF.

Ordering Physician: FRANCES WHITMORE

Performed By: La Nena Sage

____________________________________________________________________________





Interpretation Summary

The left ventricle is normal in size.

There is normal left ventricular wall thickness.

LV EF is > than 60%

Left ventricular systolic function is normal.

Doppler measurements suggest impaired left ventricular relaxation, which is

associated with grade I/IV or mild diastolic dysfunction

The left ventricular wall motion is normal.

There is no thrombus.

RV and RA not visualised, hence cannot comment.

The left atrial size is normal.

There is no evidence of mitral valve prolapse.

There is no mitral valve stenosis.

There is a trace to mild amount of mitral regurgitation

There is no aortic valve stenosis

There is no LVOT obstruction.

No aortic regurgitation is present.

There is a trace amount of tricuspid regurgitation

There is mild pulmonary hypertension by echo

RVSP is 38 mm of Hg , with RA mean of 10.

There is no pericardial effusion.

____________________________________________________________________________





MMode/2D Measurements & Calculations

RVDd: 2.7 cm       LVIDd: 5.0 cm FS: 29.0 %          Ao root diam: 2.9 cm

IVSd: 0.78 cm      LVIDs: 3.5 cm EDV(Teich): 115.5 mlAo root area: 6.5 cm2

                   LVPWd: 0.81 cmESV(Teich): 51.4 ml

                                 EF(Teich): 55.5 %

        _____________________________________________________________



LVOT diam: 1.6 cm

LVOT area: 2.1 cm2



Doppler Measurements & Calculations

MV E max corrina:      MV dec slope:        Ao V2 max:        LV V1 max P.4 cm/sec                            114.6 cm/sec      3.1 mmHg

MV A max corrina:      687.8 cm/sec2        Ao max PG:        LV V1 max:

156.6 cm/sec       MV dec time:         5.3 mmHg          87.5 cm/sec

MV E/A: 0.83       0.19 sec

                                        DU(V,D): 1.6 cm2

        _____________________________________________________________

PA V2 max:         PI max corrina:          TR max corrina:

118.2 cm/sec       156.8 cm/sec         263.4 cm/sec

PA max P.6 mmHgPI max P.9 mmHg  TR max PG:

                   PI dec slope:        27.8 mmHg



                   108.2 cm/sec2

____________________________________________________________________________



Left Ventricle

The left ventricle is normal in size. There is normal left ventricular wall

thickness. LV EF is > than 60%. Left ventricular systolic function is

normal. Doppler measurements suggest impaired left ventricular relaxation,

which is associated with grade I/IV or mild diastolic dysfunction. The left

ventricular wall motion is normal. There is no thrombus. There is no

ventricular septal defect visualized.



Right Ventricle

RV and RA not visualised, hence cannot comment.



Atria

The left atrial size is normal. The interatrial septum is intact with no

evidence for an atrial septal defect.



Mitral Valve

There is mild mitral annular calcification. There is no evidence of mitral

valve prolapse. There is no vegetation seen on the mitral valve. There is

no mitral valve stenosis. There is a trace to mild amount of mitral

regurgitation.



Aortic Valve

There is no aortic valvular vegetation. There is no aortic valve stenosis.

There is no LVOT obstruction. No aortic regurgitation is present.





Tricuspid Valve

There is no tricuspid stenosis. There is a trace amount of tricuspid

regurgitation. There is mild pulmonary hypertension by echo. RVSP is 38 mm

of Hg , with RA mean of 10.



Pulmonic Valve

There is no pulmonic valvular stenosis. There is a trace amount of pulmonic

regurgitation.



Great Vessels

The aortic root is normal size.



Effusions

There is no pericardial effusion.





____________________________________________________________________________

Electronically signed by:      Meeta Connolly      on 2018 07:16

PM



CC: FRANCES WHITMORE

>

Meeta Connolly

## 2018-07-09 NOTE — EKG REPORT
SEVERITY:- ABNORMAL ECG -

PACEMAKER SPIKES OR ARTIFACTS

SINUS RHYTHM

BORDERLINE PROLONGED QT INTERVAL

:

Confirmed by: Meeta Connolly MD 09-Jul-2018 09:15:27

## 2018-07-10 LAB
ANION GAP SERPL CALC-SCNC: 11 MMOL/L (ref 5–19)
ANION GAP SERPL CALC-SCNC: 12 MMOL/L (ref 5–19)
BUN SERPL-MCNC: 7 MG/DL (ref 7–20)
BUN SERPL-MCNC: 8 MG/DL (ref 7–20)
CALCIUM: 8.6 MG/DL (ref 8.4–10.2)
CALCIUM: 8.8 MG/DL (ref 8.4–10.2)
CHLORIDE SERPL-SCNC: 89 MMOL/L (ref 98–107)
CHLORIDE SERPL-SCNC: 90 MMOL/L (ref 98–107)
CHOLEST SERPL-MCNC: 110.08 MG/DL (ref 0–200)
CO2 SERPL-SCNC: 23 MMOL/L (ref 22–30)
CO2 SERPL-SCNC: 23 MMOL/L (ref 22–30)
ERYTHROCYTE [DISTWIDTH] IN BLOOD BY AUTOMATED COUNT: 15.2 % (ref 11.5–14)
GLUCOSE SERPL-MCNC: 169 MG/DL (ref 75–110)
GLUCOSE SERPL-MCNC: 176 MG/DL (ref 75–110)
HCT VFR BLD CALC: 31.5 % (ref 37.9–51)
HGB BLD-MCNC: 10.9 G/DL (ref 13.5–17)
LDLC SERPL DIRECT ASSAY-MCNC: 50 MG/DL (ref ?–100)
MCH RBC QN AUTO: 30 PG (ref 27–33.4)
MCHC RBC AUTO-ENTMCNC: 34.7 G/DL (ref 32–36)
MCV RBC AUTO: 87 FL (ref 80–97)
PLATELET # BLD: 248 10^3/UL (ref 150–450)
POTASSIUM SERPL-SCNC: 4.1 MMOL/L (ref 3.6–5)
POTASSIUM SERPL-SCNC: 4.8 MMOL/L (ref 3.6–5)
RBC # BLD AUTO: 3.64 10^6/UL (ref 4.35–5.55)
SODIUM SERPL-SCNC: 123.4 MMOL/L (ref 137–145)
SODIUM SERPL-SCNC: 124.8 MMOL/L (ref 137–145)
TRIGL SERPL-MCNC: 58 MG/DL (ref ?–150)
VLDLC SERPL CALC-MCNC: 12 MG/DL (ref 10–31)
WBC # BLD AUTO: 8 10^3/UL (ref 4–10.5)

## 2018-07-10 RX ADMIN — METOPROLOL SUCCINATE SCH MG: 25 TABLET, EXTENDED RELEASE ORAL at 09:38

## 2018-07-10 RX ADMIN — BENZTROPINE MESYLATE SCH MG: 1 TABLET ORAL at 09:37

## 2018-07-10 RX ADMIN — INSULIN LISPRO PRN UNIT: 100 INJECTION, SOLUTION INTRAVENOUS; SUBCUTANEOUS at 12:40

## 2018-07-10 RX ADMIN — Medication SCH ML: at 05:00

## 2018-07-10 RX ADMIN — INSULIN GLARGINE SCH UNIT: 100 INJECTION, SOLUTION SUBCUTANEOUS at 22:17

## 2018-07-10 RX ADMIN — LANSOPRAZOLE SCH MG: 15 TABLET, ORALLY DISINTEGRATING, DELAYED RELEASE ORAL at 05:18

## 2018-07-10 RX ADMIN — LANSOPRAZOLE SCH MG: 15 TABLET, ORALLY DISINTEGRATING, DELAYED RELEASE ORAL at 17:39

## 2018-07-10 RX ADMIN — CEFTRIAXONE SODIUM SCH MG: 1 INJECTION, POWDER, FOR SOLUTION INTRAMUSCULAR; INTRAVENOUS at 17:39

## 2018-07-10 RX ADMIN — BUSPIRONE HYDROCHLORIDE SCH MG: 10 TABLET ORAL at 22:13

## 2018-07-10 RX ADMIN — FLUOXETINE SCH MG: 20 CAPSULE ORAL at 09:40

## 2018-07-10 RX ADMIN — INSULIN LISPRO PRN UNIT: 100 INJECTION, SOLUTION INTRAVENOUS; SUBCUTANEOUS at 08:21

## 2018-07-10 RX ADMIN — ASPIRIN SCH MG: 325 TABLET, DELAYED RELEASE ORAL at 09:38

## 2018-07-10 RX ADMIN — PHENAZOPYRIDINE HYDROCHLORIDE SCH MG: 100 TABLET ORAL at 22:18

## 2018-07-10 RX ADMIN — Medication SCH ML: at 21:50

## 2018-07-10 RX ADMIN — OLANZAPINE SCH MG: 5 TABLET, FILM COATED ORAL at 22:14

## 2018-07-10 RX ADMIN — BENZTROPINE MESYLATE SCH MG: 1 TABLET ORAL at 17:39

## 2018-07-10 RX ADMIN — FLUOXETINE SCH MG: 20 CAPSULE ORAL at 22:14

## 2018-07-10 RX ADMIN — BUSPIRONE HYDROCHLORIDE SCH MG: 10 TABLET ORAL at 09:37

## 2018-07-10 RX ADMIN — ACETAMINOPHEN PRN MG: 325 TABLET ORAL at 05:16

## 2018-07-10 RX ADMIN — TIOTROPIUM BROMIDE SCH CAP: 18 CAPSULE ORAL; RESPIRATORY (INHALATION) at 09:43

## 2018-07-10 RX ADMIN — SIMVASTATIN SCH MG: 40 TABLET, FILM COATED ORAL at 22:14

## 2018-07-10 RX ADMIN — Medication SCH ML: at 13:25

## 2018-07-10 NOTE — RADIOLOGY REPORT (SQ)
EXAM DESCRIPTION: 



CT HEAD WITHOUT IV CONTRAST



COMPLETED DATE/TME:  07/10/2018 00:00



CLINICAL HISTORY: pt. fell and has laceration to head



COMPARISON: 9/17/2016



TECHNIQUE: Axial CT of the head obtained from the skull apex to

the skull base without contrast.



FINDINGS: 

No acute intracranial hemorrhage identified. No mass, mass

effect, shift of the midline, abnormal extra-axial fluid

collection or CT evidence of acute ischemic change identified.

The ventricular system remains dilated out of proportion to

sulcal spaces. This finding is unchanged.  No acute abnormalities

of the supratentorial white matter, basal ganglia, cerebellum, or

brainstem.



The visualized paranasal sinuses and the mastoids are clear.

  No skull fracture identified.  Visualized orbits and globes are

unremarkable. Atherosclerotic calcification of the intracranial

internal carotid arteries.



DLP: 1096.90 mGy-cm



IMPRESSION:

1.  No acute intracranial abnormality identified.



2.  Stable dilatation of the ventricular system.



This exam was performed according to our departmental

dose-optimization program, which includes automated exposure

control, adjustment of the mA and/or kV according to patient size

and/or use of iterative reconstruction technique.

## 2018-07-10 NOTE — RADIOLOGY REPORT (SQ)
EXAM DESCRIPTION:  ACUTE ABDOMEN SERIES



COMPLETED DATE/TIME:  7/10/2018 4:37 pm



REASON FOR STUDY:  dyspnea, abdomina pain w/ distention



COMPARISON:  None.



NUMBER OF VIEWS:  Three views.



TECHNIQUE:  Frontal chest, supine abdomen and upright/decubitus abdomen radiographic images acquired.




LIMITATIONS:  None.



FINDINGS:  CHEST: Lungs clear of infiltrates.

FREE AIR: None. No abnormal gas collections.

BOWEL GAS PATTERN: Moderate gaseous distension small and large bowel.  Few air-fluid levels on the ri
ght.  Minimal fecal material descending colon.

CALCIFICATIONS: No suspicious calcifications.

HARDWARE: None in the abdomen.

SOFT TISSUES: No gross mass or suggestion of organomegaly.

BONES: Total hip replacement on the right.  Hip pinning on the left.

OTHER: No other significant finding.



IMPRESSION:  Moderate ileus -probably nonobstructive however few air-fluid levels are noted on the ri
ght.



TECHNICAL DOCUMENTATION:  JOB ID:  [id number]

 2011 [employer]- All Rights Reserved



Reading location - IP/workstation name: [email or web address]

## 2018-07-10 NOTE — PDOC PROGRESS REPORT
Subjective


Progress Note for:: 07/10/18


Subjective:: 


The patient is a 48-year-old male with an extensive past medical history 

significant for MI without stents, CVA without deficits, hypertension, insulin-

dependent diabetes mellitus, hydrocephalus, chronic pain, bipolar and 

personality disorders who was admitted 7/9/18 for chest pain and hyponatremia.





Patient is seen on afternoon rounds with his significant other.  He is found 

resting in bed on room air; slightly tachypneic secondary to abdominal 

discomfort.  The patient reports that he feels like he has a UTI secondary to 

bladder spasms, abdominal discomfort, frequent need to urinate; of note the 

patient has a neurogenic bladder and typically self caths 4 times daily but has 

been requesting the nursing staff to attempt catheterizations nearly hourly.


The patient did have a fall overnight trying to get out of bed to use the 

restroom; hitting his head.  He denies LOC, headaches, dizziness.  He sustained 

a small laceration to his right forehead; covered with Band-Aid.  Head CT was 

negative.  He has no other injuries related to his fall and admits to not 

utilizing the call bell.


He denies fever, chills, although is noted to be diaphoretic, chest pain, 

palpitations, nausea, vomiting, diarrhea and constipation.  He does endorse 

slight dyspnea, chronic nonproductive cough, abdominal discomfort, and frequent 

need to void.


Reason For Visit: 


CHEST PAIN, HYPONATREMIA








Physical Exam


Vital Signs: 


 











Temp Pulse Resp BP Pulse Ox


 


 98.3 F   94   18   130/69 H  97 


 


 07/10/18 15:56  07/10/18 15:56  07/10/18 15:56  07/10/18 15:56  07/10/18 15:56











General appearance: PRESENT: cooperative, disheveled, mild distress, well-

developed, well-nourished


Head exam: PRESENT: normocephalic, other - 1 cm shallow laceration to right 

forehead


Eye exam: PRESENT: conjunctiva pink, EOMI, PERRLA.  ABSENT: scleral icterus


Ear exam: PRESENT: normal external ear exam


Mouth exam: PRESENT: moist, tongue midline


Teeth exam: PRESENT: poor dentation


Neck exam: ABSENT: carotid bruit, JVD, lymphadenopathy, thyromegaly


Respiratory exam: PRESENT: prolonged expiratory phas, rhonchi, tachypnea.  

ABSENT: rales, wheezes


Cardiovascular exam: PRESENT: RRR, +S1, +S2.  ABSENT: diastolic murmur, rubs, 

systolic murmur


Pulses: PRESENT: normal dorsalis pedis pul


Vascular exam: PRESENT: normal capillary refill


GI/Abdominal exam: PRESENT: distended, firm, hypoactive bowel sounds, 

tenderness - Generalized, other - Frequent flatus/eructation.  ABSENT: guarding

, mass, organolmegaly, rebound


Rectal exam: PRESENT: deferred


Gentrourinary exam: PRESENT: other - Bladder scan >999 ml


Extremities exam: PRESENT: full ROM.  ABSENT: calf tenderness, clubbing, pedal 

edema


Neurological exam: PRESENT: alert, awake, oriented to person, oriented to place

, oriented to time, oriented to situation, CN II-XII grossly intact.  ABSENT: 

motor sensory deficit


Psychiatric exam: PRESENT: appropriate affect, normal mood.  ABSENT: homicidal 

ideation, suicidal ideation


Focused psych exam: PRESENT: restlessness


Skin exam: PRESENT: dry, intact, warm.  ABSENT: cyanosis, rash





Results


Impressions: 


 





Chest/Abdomen CTA  07/09/18 08:57


IMPRESSION:  No CT angio evidence of thoracic aortic dissection.  No thoracic 

aortic aneurysm.


Circumferential distal esophageal wall thickening, question reflux esophagitis.

  Sandhu's esophagus or esophageal neoplasm could not be excluded.


Old bilateral rib fractures.


 








Acute Abdomen Series  07/10/18 00:00


IMPRESSION:  Moderate ileus -probably nonobstructive however few air-fluid 

levels are noted on the right.


 








Head CT  07/10/18 00:00


IMPRESSION:


1.  No acute intracranial abnormality identified.


 


2.  Stable dilatation of the ventricular system.


 


This exam was performed according to our departmental


dose-optimization program, which includes automated exposure


control, adjustment of the mA and/or kV according to patient size


and/or use of iterative reconstruction technique.


 














Assessment & Plan





- Diagnosis


(1) Chest pain


Qualifiers: 


   Chest pain type: unspecified   Qualified Code(s): R07.9 - Chest pain, 

unspecified   


Is this a current diagnosis for this admission?: Yes   


Plan: 


Resolved; no further episodes of chest pain.  Discomfort was most likely GI/ 

in origin given his development of urinary retention and ileus.


The patient presented to the emergency department with crushing chest pain 

radiating to his neck and arm with uncomfortable pulsating sensation to his mid 

back.  He has multiple risk factors including previous MI, hypertension, 

diabetes, tobacco use.


EKG demonstrated normal sinus rhythm without ST segment elevation or depression.


Chest x-ray is benign.


Troponin negative x 3


Reviewed cardiac stress test done at the patient's outpatient cardiologist, Dr. Montana, in February 2019: Post stress LVEF 35%, small anterior apical 

nonreversible area of ischemia secondary to scar noted, left ventricle 

hypokinesis.


CTA of the chest ruled out thoracic aneurysm.


Echocardiogram benign; LVEF > 60%, mild diastolic dysfunction, mild pulmonary 

hypertension.





The patient is admitted to the medical floor on continuous cardiac telemetry.


Continue PPI.


Nitroglycerin tabs 3 as needed for chest pain; IV morphine for unrelieved 

chest pain.








(2) Hyponatremia


Is this a current diagnosis for this admission?: Yes   


Plan: 


Trending up; Na currently 124.8.


Patient with sodium of 120 on admission; baseline hyponatremia of 127.  This 

likely secondary to numerous medications contributing to hyponatremia.  Most 

notably, the patient's Trileptal was increased 1 week ago.





He will be placed on IV maintenance fluids for correction.


We will trend serial chemistries.


Seizure precautions.


Holding Trileptal.








(3) Anemia


Qualifiers: 


   Anemia type: iron deficiency   Iron deficiency anemia type: inadequate 

dietary iron intake   Qualified Code(s): D50.8 - Other iron deficiency anemias 

  


Is this a current diagnosis for this admission?: Yes   


Plan: 


Chronic anemia; slightly decreased today secondary to hemodilution.


No evidence of ongoing bleeding.  Vital signs are stable.





Continue multivitamin with iron.








(4) COPD (chronic obstructive pulmonary disease)


Is this a current diagnosis for this admission?: Yes   


Plan: 


Stable; without exacerbation.  Slightly tachypneic today, although I believe 

this to be due to his abdominal distention and discomfort.


Chest x-ray is clear.





Continue the patient's home dose Spiriva.


Supplemental oxygen as needed to maintain saturations > 88%.


As needed nebulizer treatments.








(5) Diabetes


Qualifiers: 


   Diabetes mellitus type: type 2   Diabetes mellitus long term insulin use: 

with long term use   Diabetes mellitus complication status: with kidney 

complications   Diabetes mellitus complication detail: with chronic kidney 

disease   Chronic kidney disease stage: stage 2 (mild)   Qualified Code(s): 

E11.22 - Type 2 diabetes mellitus with diabetic chronic kidney disease; N18.2 - 

Chronic kidney disease, stage 2 (mild); N18.2 - Chronic kidney disease, stage 2 

(mild); Z79.4 - Long term (current) use of insulin; Z79.4 - Long term (current) 

use of insulin; Z79.4 - Long term (current) use of insulin; Z79.4 - Long term (

current) use of insulin   


Is this a current diagnosis for this admission?: Yes   


Plan: 


The patient is placed on a consistent carb diet.


Accu-Cheks before meals and at bedtime with Humalog for sliding scale coverage.


Continue the patient's home dose Lantus.








(6) Hypertension


Qualifiers: 


   Hypertension type: essential hypertension   Qualified Code(s): I10 - 

Essential (primary) hypertension   


Is this a current diagnosis for this admission?: Yes   


Plan: 


Continue patient's home dose medication; metoprolol and lisinopril








(7) Hypothyroidism


Is this a current diagnosis for this admission?: Yes   


Plan: 


TSH is high; 4.89.


Per patient and significant other, he has not been told that he has 

hypothyroidism.





Continue levothyroxine 25 mcg daily.








(8) Bipolar 1 disorder


Is this a current diagnosis for this admission?: Yes   


Plan: 


We will continue the patient's home medications; BuSpar, Cogentin, Prozac, and 

Zyprexa.








(9) Neuropathic pain


Is this a current diagnosis for this admission?: Yes   


Plan: 


Continue the patient's home dose baclofen.


Holding Trilpital secondary to hyponatremia.





Should be able to resume lower dose Trileptal tomorrow if sodium continues to 

trend upward.








(10) Nicotine abuse


Is this a current diagnosis for this admission?: Yes   


Plan: 


Previous smoker with 30-pack-year history.  Now the high-dose nicotine.


Nicotine cessation is encouraged.  NicoDerm patch is provided.








(11) Fall


Qualifiers: 


   Encounter type: initial encounter   Qualified Code(s): W19.XXXA - 

Unspecified fall, initial encounter   


Is this a current diagnosis for this admission?: Yes   


Plan: 


The patient fell overnight attempting to get out of bed independently, hitting 

his head.


Head CT was benign.


No other injuries.








(12) Urinary retention


Is this a current diagnosis for this admission?: Yes   


Plan: 


Secondary to urinary tract infection.


Patient typically self caths every 6 hours but has been requesting nursing 

staff to do in and out caths hourly.





Scott catheter is placed.








(13) UTI (urinary tract infection)


Qualifiers: 


   Hematuria presence: with hematuria 


Is this a current diagnosis for this admission?: Yes   


Plan: 


Urinalysis is positive for UTI.


Urine culture is pending.





The patient is empirically placed on ceftriaxone.


Pyridium 3 times daily for bladder spasms/discomfort.


Continue IV maintenance fluids.








(14) Ileus


Is this a current diagnosis for this admission?: Yes   


Plan: 


The patient had significant abdominal distention and discomfort today with 

hypoactive bowel sounds.  No nausea or vomiting.


Acute abdominal series demonstrated moderate ileus with right-sided air-fluid 

levels.


CT of the abdomen and pelvis is pending.


Patient has been n.p.o.


Surgery has been consulted; appreciate Dr. Gutierrez's evaluation recommendations.








- Time


Time Spent with patient: 35 or more minutes


Medications reviewed and adjusted accordingly: Yes





- Inpatient Certification


Based on my medical assessment, after consideration of the patient's 

comorbidities, presenting symptoms, or acuity I expect that the services needed 

warrant INPATIENT care.: Yes


I certify that my determination is in accordance with my understanding of 

Medicare's requirements for reasonable and necessary INPATIENT services [42 CFR 

412.3e].: Yes


Medical Necessity: Need Close Monitoring Due to Risk of Patient Decompensation, 

Need For IV Fluids, Need for IV Antibiotics

## 2018-07-11 LAB
ANION GAP SERPL CALC-SCNC: 15 MMOL/L (ref 5–19)
BUN SERPL-MCNC: 9 MG/DL (ref 7–20)
CALCIUM: 9.4 MG/DL (ref 8.4–10.2)
CHLORIDE SERPL-SCNC: 100 MMOL/L (ref 98–107)
CO2 SERPL-SCNC: 24 MMOL/L (ref 22–30)
ERYTHROCYTE [DISTWIDTH] IN BLOOD BY AUTOMATED COUNT: 15.2 % (ref 11.5–14)
GLUCOSE SERPL-MCNC: 142 MG/DL (ref 75–110)
HCT VFR BLD CALC: 36.6 % (ref 37.9–51)
HGB BLD-MCNC: 12.5 G/DL (ref 13.5–17)
MCH RBC QN AUTO: 29.9 PG (ref 27–33.4)
MCHC RBC AUTO-ENTMCNC: 34 G/DL (ref 32–36)
MCV RBC AUTO: 88 FL (ref 80–97)
PLATELET # BLD: 290 10^3/UL (ref 150–450)
POTASSIUM SERPL-SCNC: 4.6 MMOL/L (ref 3.6–5)
RBC # BLD AUTO: 4.17 10^6/UL (ref 4.35–5.55)
SODIUM SERPL-SCNC: 139.4 MMOL/L (ref 137–145)
WBC # BLD AUTO: 8.3 10^3/UL (ref 4–10.5)

## 2018-07-11 RX ADMIN — PEDIATRIC MULTIPLE VITAMINS W/ IRON CHEW TAB 18 MG SCH TAB: 18 CHEW TAB at 09:37

## 2018-07-11 RX ADMIN — POTASSIUM CHLORIDE SCH MEQ: 1.5 SOLUTION ORAL at 09:31

## 2018-07-11 RX ADMIN — ASPIRIN 325 MG ORAL TABLET SCH MG: 325 PILL ORAL at 09:40

## 2018-07-11 RX ADMIN — SODIUM CHLORIDE PRN ML: 9 INJECTION, SOLUTION INTRAVENOUS at 15:52

## 2018-07-11 RX ADMIN — INSULIN LISPRO PRN UNIT: 100 INJECTION, SOLUTION INTRAVENOUS; SUBCUTANEOUS at 13:22

## 2018-07-11 RX ADMIN — BENZTROPINE MESYLATE SCH MG: 1 TABLET ORAL at 09:35

## 2018-07-11 RX ADMIN — CEFTRIAXONE SODIUM SCH MG: 1 INJECTION, POWDER, FOR SOLUTION INTRAMUSCULAR; INTRAVENOUS at 15:51

## 2018-07-11 RX ADMIN — SIMVASTATIN SCH MG: 40 TABLET, FILM COATED ORAL at 21:44

## 2018-07-11 RX ADMIN — BENZTROPINE MESYLATE SCH MG: 1 TABLET ORAL at 17:38

## 2018-07-11 RX ADMIN — LISINOPRIL SCH MG: 5 TABLET ORAL at 09:34

## 2018-07-11 RX ADMIN — TIOTROPIUM BROMIDE SCH CAP: 18 CAPSULE ORAL; RESPIRATORY (INHALATION) at 09:38

## 2018-07-11 RX ADMIN — DOCUSATE SODIUM SCH MG: 100 CAPSULE, LIQUID FILLED ORAL at 17:30

## 2018-07-11 RX ADMIN — METOPROLOL SUCCINATE SCH MG: 25 TABLET, EXTENDED RELEASE ORAL at 09:33

## 2018-07-11 RX ADMIN — DOCUSATE SODIUM SCH MG: 100 CAPSULE, LIQUID FILLED ORAL at 09:40

## 2018-07-11 RX ADMIN — LEVOTHYROXINE SODIUM SCH MG: 25 TABLET ORAL at 06:27

## 2018-07-11 RX ADMIN — FLUOXETINE SCH MG: 20 CAPSULE ORAL at 09:40

## 2018-07-11 RX ADMIN — INSULIN GLARGINE SCH UNIT: 100 INJECTION, SOLUTION SUBCUTANEOUS at 21:43

## 2018-07-11 RX ADMIN — INSULIN LISPRO PRN UNIT: 100 INJECTION, SOLUTION INTRAVENOUS; SUBCUTANEOUS at 17:37

## 2018-07-11 RX ADMIN — PHENAZOPYRIDINE HYDROCHLORIDE SCH MG: 100 TABLET ORAL at 13:22

## 2018-07-11 RX ADMIN — FLUOXETINE SCH MG: 20 CAPSULE ORAL at 21:44

## 2018-07-11 RX ADMIN — BUSPIRONE HYDROCHLORIDE SCH MG: 10 TABLET ORAL at 21:44

## 2018-07-11 RX ADMIN — Medication SCH ML: at 21:45

## 2018-07-11 RX ADMIN — INSULIN LISPRO PRN UNIT: 100 INJECTION, SOLUTION INTRAVENOUS; SUBCUTANEOUS at 08:50

## 2018-07-11 RX ADMIN — OLANZAPINE SCH MG: 5 TABLET, FILM COATED ORAL at 21:44

## 2018-07-11 RX ADMIN — Medication SCH ML: at 05:13

## 2018-07-11 RX ADMIN — OXCARBAZEPINE SCH MG: 150 TABLET, FILM COATED ORAL at 21:44

## 2018-07-11 RX ADMIN — BUSPIRONE HYDROCHLORIDE SCH MG: 10 TABLET ORAL at 09:34

## 2018-07-11 RX ADMIN — Medication SCH ML: at 13:13

## 2018-07-11 RX ADMIN — PHENAZOPYRIDINE HYDROCHLORIDE SCH MG: 100 TABLET ORAL at 06:27

## 2018-07-11 RX ADMIN — IPRATROPIUM BROMIDE AND ALBUTEROL SULFATE PRN ML: 2.5; .5 SOLUTION RESPIRATORY (INHALATION) at 14:19

## 2018-07-11 RX ADMIN — FLUOXETINE SCH MG: 20 CAPSULE ORAL at 09:37

## 2018-07-11 RX ADMIN — TAMSULOSIN HYDROCHLORIDE SCH MG: 0.4 CAPSULE ORAL at 18:17

## 2018-07-11 RX ADMIN — LANSOPRAZOLE SCH MG: 15 TABLET, ORALLY DISINTEGRATING, DELAYED RELEASE ORAL at 17:37

## 2018-07-11 RX ADMIN — PHENAZOPYRIDINE HYDROCHLORIDE SCH MG: 100 TABLET ORAL at 21:44

## 2018-07-11 RX ADMIN — LANSOPRAZOLE SCH MG: 15 TABLET, ORALLY DISINTEGRATING, DELAYED RELEASE ORAL at 06:27

## 2018-07-11 NOTE — PDOC PROGRESS REPORT
Subjective


Progress Note for:: 07/11/18


Subjective:: 





48-year-old male with UTI and ileus.  The patient is having bowel movements and 

tolerating liquids.  Patient denies chest pain, shortness of breath, fevers, 

chills, fatigue, malaise, dizziness, orthostasis.


Reason For Visit: 


CHEST PAIN, HYPONATREMIA








Physical Exam


Vital Signs: 


 











Temp Pulse Resp BP Pulse Ox


 


 98.3 F   84   20   117/82   94 


 


 07/11/18 04:18  07/11/18 04:18  07/11/18 04:18  07/11/18 04:18  07/11/18 04:18








 Intake & Output











 07/09/18 07/10/18 07/11/18





 06:59 06:59 06:59


 


Intake Total   980


 


Output Total   2900


 


Balance   -1920


 


Weight   65.1 kg











General appearance: PRESENT: no acute distress


Head exam: PRESENT: atraumatic, normocephalic


Eye exam: PRESENT: EOMI, PERRLA.  ABSENT: scleral icterus


Neck exam: ABSENT: meningismus, tenderness, thyromegaly, tracheal deviation


Respiratory exam: PRESENT: unlabored.  ABSENT: tachypnea, wheezes


Pulses: PRESENT: normal radial pulses


Vascular exam: PRESENT: normal capillary refill.  ABSENT: pallor


GI/Abdominal exam: PRESENT: distended - Mild, soft.  ABSENT: tenderness


Rectal exam: PRESENT: deferred


Neurological exam: PRESENT: alert, awake, oriented to person, oriented to place

, oriented to time, oriented to situation, CN II-XII grossly intact


Psychiatric exam: ABSENT: agitated, anxious, depressed


Skin exam: ABSENT: cyanosis, erythema, jaundice





Results


Impressions: 


 





Chest/Abdomen CTA  07/09/18 08:57


IMPRESSION:  No CT angio evidence of thoracic aortic dissection.  No thoracic 

aortic aneurysm.


Circumferential distal esophageal wall thickening, question reflux esophagitis.

  Sandhu's esophagus or esophageal neoplasm could not be excluded.


Old bilateral rib fractures.


 








Acute Abdomen Series  07/10/18 00:00


IMPRESSION:  Moderate ileus -probably nonobstructive however few air-fluid 

levels are noted on the right.


 








Head CT  07/10/18 00:00


IMPRESSION:


1.  No acute intracranial abnormality identified.


 


2.  Stable dilatation of the ventricular system.


 


This exam was performed according to our departmental


dose-optimization program, which includes automated exposure


control, adjustment of the mA and/or kV according to patient size


and/or use of iterative reconstruction technique.


 














Assessment & Plan





- Diagnosis


(1) Ileus


Is this a current diagnosis for this admission?: Yes   





(2) UTI (urinary tract infection)


Qualifiers: 


   Hematuria presence: with hematuria 


Is this a current diagnosis for this admission?: Yes   





- Plan Summary


Plan Summary: 





48-year-old male with UTI and ileus.  The patient's bowels are moving today.  

The patient feels well.  Advance diet as tolerated.  I will see the patient 

again on an as-needed basis.  He is renotify with any questions or concerns.

## 2018-07-11 NOTE — PDOC PROGRESS REPORT
Subjective


Progress Note for:: 07/11/18


Subjective:: 


The patient is a 48-year-old male with an extensive past medical history 

significant for MI without stents, CVA without deficits, hypertension, insulin-

dependent diabetes mellitus, hydrocephalus, chronic pain, bipolar and 

personality disorders who was admitted 7/9/18 for chest pain and hyponatremia.





Patient is seen on afternoon rounds with his significant other.  He is found 

resting in bed comfortably on room air.  He reports that he has had several 

loose bowel movements today and continues to pass gas.  His abdominal 

distention and discomfort have resolved.


His only complaint at present he is a migraine headache, similar to his normal 

headache discomfort, and not associated with phonophobia, photophobia, nausea 

or vomiting.


He denies fever, chills, chest pain, palpitations, dyspnea, orthopnea, 

abdominal pain, nausea and vomiting.


Overall, he reports that he is feeling much improved and is looking forward to 

his discharge to home.


Reason For Visit: 


CHEST PAIN, HYPONATREMIA








Physical Exam


Vital Signs: 


 











Temp Pulse Resp BP Pulse Ox


 


 98.4 F   95   19   121/61   95 


 


 07/11/18 14:57  07/11/18 14:57  07/11/18 14:57  07/11/18 14:57  07/11/18 14:57








 Intake & Output











 07/10/18 07/11/18 07/12/18





 06:59 06:59 06:59


 


Intake Total  980 1355


 


Output Total  2900 1750


 


Balance  -1920 -395


 


Weight  65.1 kg 











General appearance: PRESENT: no acute distress, cooperative, disheveled, well-

developed, well-nourished, other - Overweight


Head exam: PRESENT: atraumatic, normocephalic


Eye exam: PRESENT: conjunctiva pink, EOMI, PERRLA.  ABSENT: scleral icterus


Ear exam: PRESENT: normal external ear exam


Mouth exam: PRESENT: moist, tongue midline


Teeth exam: PRESENT: poor dentation


Neck exam: ABSENT: carotid bruit, JVD, lymphadenopathy, thyromegaly


Respiratory exam: PRESENT: clear to auscultation david, prolonged expiratory phas

, rhonchi - Occasional, symmetrical, unlabored.  ABSENT: rales, wheezes


Cardiovascular exam: PRESENT: RRR, +S1, +S2.  ABSENT: diastolic murmur, rubs, 

systolic murmur


Pulses: PRESENT: normal dorsalis pedis pul


Vascular exam: PRESENT: normal capillary refill


GI/Abdominal exam: PRESENT: distended - Significant improvement from yesterday, 

normal bowel sounds, soft.  ABSENT: guarding, mass, organolmegaly, rebound, 

tenderness


Rectal exam: PRESENT: deferred


Extremities exam: PRESENT: full ROM.  ABSENT: calf tenderness, clubbing, pedal 

edema


Neurological exam: PRESENT: alert, awake, oriented to person, oriented to place

, oriented to time, oriented to situation, CN II-XII grossly intact.  ABSENT: 

motor sensory deficit


Psychiatric exam: PRESENT: appropriate affect, normal mood.  ABSENT: homicidal 

ideation, suicidal ideation


Skin exam: PRESENT: dry, intact, warm.  ABSENT: cyanosis, rash





Results


Laboratory Results: 


 





 07/11/18 08:36 





 07/11/18 08:36 





 











  07/11/18 07/11/18





  08:36 08:36


 


WBC  8.3 


 


RBC  4.17 L 


 


Hgb  12.5 L 


 


Hct  36.6 L 


 


MCV  88 


 


MCH  29.9 


 


MCHC  34.0 


 


RDW  15.2 H 


 


Plt Count  290 


 


Sodium   139.4


 


Potassium   4.6


 


Chloride   100


 


Carbon Dioxide   24


 


Anion Gap   15


 


BUN   9


 


Creatinine   0.72


 


Est GFR ( Amer)   > 60


 


Est GFR (Non-Af Amer)   > 60


 


Glucose   142 H


 


Calcium   9.4











Impressions: 


 





Chest/Abdomen CTA  07/09/18 08:57


IMPRESSION:  No CT angio evidence of thoracic aortic dissection.  No thoracic 

aortic aneurysm.


Circumferential distal esophageal wall thickening, question reflux esophagitis.

  Sandhu's esophagus or esophageal neoplasm could not be excluded.


Old bilateral rib fractures.


 








Acute Abdomen Series  07/10/18 00:00


IMPRESSION:  Moderate ileus -probably nonobstructive however few air-fluid 

levels are noted on the right.


 








Head CT  07/10/18 00:00


IMPRESSION:


1.  No acute intracranial abnormality identified.


 


2.  Stable dilatation of the ventricular system.


 


This exam was performed according to our departmental


dose-optimization program, which includes automated exposure


control, adjustment of the mA and/or kV according to patient size


and/or use of iterative reconstruction technique.


 








Abdomen/Pelvis CT  07/11/18 00:00


IMPRESSION:  No CT evidence of bowel obstruction


 














Assessment & Plan





- Diagnosis


(1) Chest pain


Qualifiers: 


   Chest pain type: unspecified   Qualified Code(s): R07.9 - Chest pain, 

unspecified   


Is this a current diagnosis for this admission?: Yes   


Plan: 


Resolved; no further episodes of chest pain.  Discomfort was most likely GI/ 

in origin given his development of urinary retention and ileus.


The patient presented to the emergency department with crushing chest pain 

radiating to his neck and arm with uncomfortable pulsating sensation to his mid 

back.  He has multiple risk factors including previous MI, hypertension, 

diabetes, tobacco use.


EKG demonstrated normal sinus rhythm without ST segment elevation or depression.


Chest x-ray is benign.


Troponin negative x 3


Reviewed cardiac stress test done at the patient's outpatient cardiologist, Dr. Montana, in February 2019: Post stress LVEF 35%, small anterior apical 

nonreversible area of ischemia secondary to scar noted, left ventricle 

hypokinesis.


CTA of the chest ruled out thoracic aneurysm.


Echocardiogram benign; LVEF > 60%, mild diastolic dysfunction, mild pulmonary 

hypertension.





The patient is admitted to the medical floor on continuous cardiac telemetry.


Continue PPI.


Nitroglycerin tabs 3 as needed for chest pain.








(2) Hyponatremia


Is this a current diagnosis for this admission?: Yes   


Plan: 


Resolved; sodium currently 139.4.


Patient with sodium of 120 on admission; baseline hyponatremia of 127.  This 

likely secondary to numerous medications contributing to hyponatremia.  Most 

notably, the patient's Trileptal was increased 1 week ago.





He will be placed on IV maintenance fluids for correction; maintenance fluids 

are decreased.


We will trend serial chemistries.


Seizure precautions.


We will resume low-dose Trileptal; 150 mg p.o. twice daily.  The patient and 

family member were strongly cautioned that they will require repeat lab 

evaluation 1 week following discharge as well as follow-up with his neurologist 

to monitor hyponatremia and adjust medications further as necessary.








(3) Anemia


Qualifiers: 


   Anemia type: iron deficiency   Iron deficiency anemia type: inadequate 

dietary iron intake   Qualified Code(s): D50.8 - Other iron deficiency anemias 

  


Is this a current diagnosis for this admission?: Yes   


Plan: 


Chronic anemia; improved today.


Hemoglobin currently 12.5 with baseline of 12-13.


No evidence of bleeding.  Vital signs are stable.





Continue multivitamin with iron.








(4) COPD (chronic obstructive pulmonary disease)


Is this a current diagnosis for this admission?: Yes   


Plan: 


Stable; without exacerbation.  


Chest x-ray is clear.





Continue the patient's home dose Spiriva.


Supplemental oxygen as needed to maintain saturations > 88%.


As needed nebulizer treatments.








(5) Diabetes


Qualifiers: 


   Diabetes mellitus type: type 2   Diabetes mellitus long term insulin use: 

with long term use   Diabetes mellitus complication status: with kidney 

complications   Diabetes mellitus complication detail: with chronic kidney 

disease   Chronic kidney disease stage: stage 2 (mild)   Qualified Code(s): 

E11.22 - Type 2 diabetes mellitus with diabetic chronic kidney disease; N18.2 - 

Chronic kidney disease, stage 2 (mild); N18.2 - Chronic kidney disease, stage 2 

(mild); Z79.4 - Long term (current) use of insulin; Z79.4 - Long term (current) 

use of insulin; Z79.4 - Long term (current) use of insulin; Z79.4 - Long term (

current) use of insulin   


Is this a current diagnosis for this admission?: Yes   


Plan: 


Appropriate blood glucose control with current regimen.


The patient is placed on a consistent carb diet.


Accu-Cheks before meals and at bedtime with Humalog for sliding scale coverage.


Continue the patient's home dose Lantus.








(6) Hypertension


Qualifiers: 


   Hypertension type: essential hypertension   Qualified Code(s): I10 - 

Essential (primary) hypertension   


Is this a current diagnosis for this admission?: Yes   


Plan: 


Normotensive at present.


Continue patient's home dose medication; metoprolol and lisinopril








(7) Hypothyroidism


Is this a current diagnosis for this admission?: Yes   


Plan: 


TSH is high; 4.89.


Per patient and significant other, he has not been told that he has 

hypothyroidism.





Continue levothyroxine 25 mcg daily.


The patient will require follow-up labs in 6 weeks.








(8) Bipolar 1 disorder


Is this a current diagnosis for this admission?: Yes   


Plan: 


We will continue the patient's home medications; BuSpar, Cogentin, Prozac, and 

Zyprexa.








(9) Neuropathic pain


Is this a current diagnosis for this admission?: Yes   


Plan: 


Continue the patient's home dose baclofen.





Have resumed low-dose Trileptal; 150 mg twice daily.  This is a reduced dose 

from his home prescription.


The patient and family member are strongly cautioned that the patient will 

require repeat laboratory evaluation within 1 week.  He will also need follow-

up with his neurologist; if hyponatremia reoccurs Trileptal may no longer be 

safe for him.  He will require close monitoring.  The patient's significant 

other confirms understanding with plan to make an appointment with both his 

primary care and neurologist.








(10) Nicotine abuse


Is this a current diagnosis for this admission?: Yes   


Plan: 


Previous smoker with 30-pack-year history.  Now the high-dose nicotine.


Nicotine cessation is encouraged.  NicoDerm patch is provided.








(11) Fall


Qualifiers: 


   Encounter type: initial encounter   Qualified Code(s): W19.XXXA - 

Unspecified fall, initial encounter   


Is this a current diagnosis for this admission?: Yes   


Plan: 


The patient fell overnight attempting to get out of bed independently, hitting 

his head.


Head CT was benign.


No other injuries.








(12) Urinary retention


Is this a current diagnosis for this admission?: Yes   


Plan: 


Secondary to urinary tract infection.


Patient typically self caths every 6 hours but has been requesting nursing 

staff to do in and out caths hourly.





Scott catheter is placed.


The patient is placed on Flomax.  He will need to follow-up with his 

established urologist after discharge.








(13) UTI (urinary tract infection)


Qualifiers: 


   Hematuria presence: with hematuria 


Is this a current diagnosis for this admission?: Yes   


Plan: 


Urinalysis is positive for UTI.


Urine culture is pending.





The patient is empirically placed on ceftriaxone.


Pyridium 3 times daily for bladder spasms/discomfort.


Continue gentle IV maintenance fluids.








(14) Ileus


Is this a current diagnosis for this admission?: Yes   


Plan: 


Improved; the patient had significant abdominal distention and discomfort today 

with hypoactive bowel sounds.  Nursing reports that after drinking the oral 

contrast, the patient passed a significant amount of gas followed by several 

loose stools and resolution of his abdominal discomfort.


Acute abdominal series demonstrated moderate ileus with right-sided air-fluid 

levels.


CT of the abdomen and pelvis was negative for evidence of bowel obstruction.





Surgery has been consulted; appreciate Dr. Gutierrez's evaluation recommendations.

  No indications for surgical intervention at this time; the patient was placed 

on a full liquid diet and advanced as tolerated.  


He is currently mildly distended, hyperactive bowel sounds, and pain-free.  He 

continues to have loose stools and pass gas.  He tolerated a full liquid diet 

through the day today and so will begin advancing for dinner.








- Time


Time Spent with patient: 25-34 minutes


Medications reviewed and adjusted accordingly: Yes


Anticipated discharge: Home





- Plan Summary


Plan Summary: 


Patient initially presented with chest pain; troponins negative 3, 

echocardiogram is benign, stress test from February 2018 was reviewed.  Patient 

should follow-up with his established cardiologist to discuss indications for 

cardiac catheterization.


Hyponatremia has resolved.


Ileus appears to be resolving; abdomen is soft, passing stools, tolerating diet.


Currently receiving IV Rocephin for urinary tract infection; urine culture is 

pending.


Disposition/discharge date will depend upon urine culture results.  Anticipate 

he will be ready for discharge within the next 24-48 hours.

## 2018-07-11 NOTE — RADIOLOGY REPORT (SQ)
EXAM DESCRIPTION:  CT ABD/PELVIS ORAL ONLY



COMPLETED DATE/TIME:  7/11/2018 1:06 am



REASON FOR STUDY:  moderate ileus on xray, ? bowel obstruction



COMPARISON:  CT abdomen pelvis 10/13/2014, 10/26/2013



TECHNIQUE:  CT scan of the abdomen and pelvis performed without intravenous contrast.

Patient drank oral contrast

Images reviewed with lung, soft tissue, and bone windows. Reconstructed coronal and sagittal MPR imag
es reviewed. All images stored on PACS.

All CT scanners at this facility use dose modulation, iterative reconstruction, and/or weight based d
osing when appropriate to reduce radiation dose to as low as reasonably achievable (ALARA).

[facility]: Dose Right  [facility]: CareDose    [facility]: Dose Right    [facility]: Teradose 4D    [facility]: Smart AlpineReplay



RADIATION DOSE:  CT Rad equipment meets quality standard of care and radiation dose reduction techniq
ues were employed. CTDIvol: 6.8 mGy. DLP: 341 mGy-cm.mGy.



LIMITATIONS:  Motion artifact



FINDINGS:  LOWER CHEST: There is a small hiatal hernia with thickened distal esophageal wall.  Ayush
t's esophagus could not be excluded.  Lung bases are grossly clear.

NON-CONTRASTED LIVER, SPLEEN, ADRENALS: New

PANCREAS: No masses. No peripancreatic inflammatory changes.

GALLBLADDER: No identified stones by CT criteria. No inflammatory changes to suggest cholecystitis.

RIGHT KIDNEY AND URETER: No suspicious masses. Assessment limited by lack of IV contrast.   No signif
icant calcifications.   No hydronephrosis or hydroureter.

LEFT KIDNEY AND URETER: No suspicious masses. Assessment limited by lack of IV contrast.   No signifi
cant calcifications   No hydronephrosis or hydroureter.

AORTA AND RETROPERITONEUM: No aneurysm. No retroperitoneal masses or adenopathy.

BOWEL AND PERITONEAL CAVITY: Patient drank oral contrast.  There is no CT evidence of bowel obstructi
on, free intraperitoneal air or fluid.  No CT evidence of diverticulitis

APPENDIX: Normal.

PELVIS, BLADDER, AND ABDOMINAL WALL:Scott catheter drains the urinary bladder.  No free pelvic fluid,
 masses, or adenopathy.

BONES: Multilevel chronic mild upper endplate compression deformities in the lower thoracic and upper
 lumbar spine.  Multiple old healed rib fractures.  Bilateral hip hardware is present.

OTHER: No other significant finding.



IMPRESSION:  No CT evidence of bowel obstruction



COMMENT:  Quality ID # [id number]: Final reports with documentation of one or more dose reduction techniques
 (e.g., Automated exposure control, adjustment of the mA and/or kV according to patient size, use of 
iterative reconstruction technique)



TECHNICAL DOCUMENTATION:  JOB ID:  [id number]

 2011 [employer]- All Rights Reserved



Reading location - IP/workstation name: [facility]

## 2018-07-11 NOTE — PDOC CONSULTATION
Consultation


Consult Date: 07/10/18


Consult reason:: Abdominal distention, ileus





History of Present Illness


Admission Date/PCP: 


  07/10/18 15:48





  CRISTIAN BRINK MD





History of Present Illness: 


TARA SCOTT is a 48 year old male seen at the request of the hospitalist 

service for consultation.  This is a 48-year-old male with a urinary tract 

infection.  He reports constipation that is chronic and recent abdominal 

distention.  The patient became obstipated after receiving morphine and 

hydrocodone.  Patient has limited mobility.  The patient had nausea and 

vomiting yesterday, but this has subsided.  The patient is now starting to pass 

flatus.  Currently he is n.p.o.  Today the patient denies chest pain, shortness 

of breath, fevers, chills, nausea, vomiting, dizziness, orthostasis, fatigue, 

malaise.








Past Medical History


Cardiac Medical History: Reports: Congestive Heart Failure, Coronary Artery 

Disease, Myocardial Infarction, Hypertension


Pulmonary Medical History: Reports: Bronchitis, Chronic Obstructive Pulmonary 

Disease (COPD), Pneumonia


EENT Medical History: Reports: None


Neurological Medical History: Reports: Migraine, Seizures, Other - Hydrocephalus

, spina bifida


Endocrine Medical History: Reports: Diabetes Mellitus Type 1


Renal/ Medical History: Reports: None


Malignancy Medical History: Reports: None


GI Medical History: Reports: Gastroesophageal Reflux Disease


Musculoskeltal Medical History: Reports: Arthritis


Psychiatric Medical History: Reports: Attention Deficit Hyperactivity Disorder, 

Depression, Personality Disorder, Tobacco Dependency


Hematology: Reports: Anemia


Infectious Medical History: Reports: None





Past Surgical History


Past Surgical History: Reports: Orthopedic Surgery - Bilateral hip, 

Tonsillectomy, Other - Ventral septal defect repair as infant





Social History


Lives with: Spouse/Significant other


Smoking Status: Current Every Day Smoker


Cigarettes Packs Per Day: 1 - Currently Vaps


Number of Years Smokin


Frequency of Alcohol Use: Rare


Hx Recreational Drug Use: Yes


Drugs: Cocaine


Hx Prescription Drug Abuse: No





- Advance Directive


Resuscitation Status: Full Code





Family History


Family History: COPD, DM, Hypertension


Parental Family History Reviewed: Yes


Children Family History Reviewed: Yes


Sibling(s) Family History Reviewed.: Yes





Medication/Allergy


Home Medications: 








Baclofen [Baclofen 10 mg Tablet] 10 mg PO BIDP PRN 18 


Benztropine Mesylate [Cogentin 1 mg Tablet] 1 mg PO BID 18 


Buspirone HCl [Buspar 10 mg Tablet] 5 mg PO Q12 18 


Ergocalciferol (Vitamin D2) [Drisdol 50,000 unit (1.25MG) Capsule] 50,000 unit 

PO SA@1000 18 


Esomeprazole Mag Trihydrate [Nexium] 40 mg PO DAILY 18 


Fluoxetine HCl [Prozac] 20 mg PO DAILY 18 


Fluoxetine HCl [Prozac] 40 mg PO Q12 18 


Insulin Aspart [Novolog Flexpen] 6 units SQ MEALS 18 


Insulin Glargine,Hum.rec.anlog [Lantus Solostar] 15 units SQ QHS 18 


Lisinopril [Prinivil 2.5 mg Tablet] 2.5 mg PO DAILY 18 


Metoprolol Succinate [Toprol Xl 25 mg Tab.sr] 12.5 mg PO DAILY 18 


Olanzapine [Zyprexa] 15 mg PO QHS 18 


Oxcarbazepine [Trileptal] 600 mg PO BID 18 


Potassium Chloride [Klor-Con 10 Meq Capsule ER] 10 meq PO DAILY 18 


Simvastatin [Zocor 40 mg Tablet] 40 mg PO QHS 18 


Tiotropium Bromide [Spiriva Handihaler 18 mcg/dose (30 Dose)] 1 cap IH DAILY  








Allergies/Adverse Reactions: 


 





bee pollen [Bee Pollen] Allergy (Verified 17 03:01)


 


ketorolac [From Toradol] Allergy (Verified 17 03:01)


 











Review of Systems


Constitutional: ABSENT: chills, fever(s), night sweats


Eyes: ABSENT: visual disturbances


Ears: ABSENT: hearing changes


Nose, Mouth, and Throat: ABSENT: sore throat


Cardiovascular: ABSENT: chest pain, palpitations


Respiratory: ABSENT: cough


Gastrointestinal: PRESENT: abdominal pain, bloating, constipation.  ABSENT: 

hematochezia, melena


Genitourinary: PRESENT: other - UTI


Integumentary: ABSENT: pruritus, rash


Neurological: ABSENT: abnormal speech, confusion


Psychiatric: ABSENT: anxiety, depression, hallucinations


Endocrine: ABSENT: cold intolerance, heat intolerance


Hematologic/Lymphatic: ABSENT: easy bleeding, easy bruising





Physical Exam


Vital Signs: 


 











Temp Pulse Resp BP Pulse Ox


 


 99.1 F   71   16   115/66   95 


 


 07/10/18 20:00  07/10/18 20:55  07/10/18 20:55  07/10/18 20:00  07/10/18 20:55











General appearance: PRESENT: no acute distress, cooperative


Head exam: PRESENT: atraumatic, normocephalic


Eye exam: PRESENT: EOMI, PERRLA


Mouth exam: ABSENT: neck supple


Neck exam: ABSENT: meningismus, tenderness, thyromegaly, tracheal deviation


Respiratory exam: PRESENT: clear to auscultation david, unlabored.  ABSENT: chest 

wall tenderness, wheezes


Cardiovascular exam: PRESENT: RRR


Pulses: PRESENT: normal radial pulses


Vascular exam: PRESENT: normal capillary refill.  ABSENT: pallor


GI/Abdominal exam: PRESENT: distended, soft.  ABSENT: tenderness


Rectal exam: PRESENT: deferred


Extremities exam: ABSENT: clubbing


Musculoskeletal exam: ABSENT: deformity


Neurological exam: PRESENT: alert, awake, oriented to person, oriented to place

, oriented to time, oriented to situation, CN II-XII grossly intact


Psychiatric exam: ABSENT: agitated, anxious, depressed


Skin exam: ABSENT: cyanosis, erythema, jaundice





Results


Impressions: 


 





Chest/Abdomen CTA  18 08:57


IMPRESSION:  No CT angio evidence of thoracic aortic dissection.  No thoracic 

aortic aneurysm.


Circumferential distal esophageal wall thickening, question reflux esophagitis.

  Sandhu's esophagus or esophageal neoplasm could not be excluded.


Old bilateral rib fractures.


 








Acute Abdomen Series  07/10/18 00:00


IMPRESSION:  Moderate ileus -probably nonobstructive however few air-fluid 

levels are noted on the right.


 








Head CT  07/10/18 00:00


IMPRESSION:


1.  No acute intracranial abnormality identified.


 


2.  Stable dilatation of the ventricular system.


 


This exam was performed according to our departmental


dose-optimization program, which includes automated exposure


control, adjustment of the mA and/or kV according to patient size


and/or use of iterative reconstruction technique.


 














Assessment & Plan





- Diagnosis


(1) Ileus


Is this a current diagnosis for this admission?: Yes   





(2) UTI (urinary tract infection)


Qualifiers: 


   Hematuria presence: with hematuria 


Is this a current diagnosis for this admission?: Yes   





- Plan Summary


Plan Summary: 





This is a 48-year-old male with a mild ileus and UTI.  The patient has 

abdominal film showing dilation of his small bowel and colon.  The patient 

continues to pass flatus.  Patient reports constipation after taking morphine 

for pain.  I will add stool softeners and fiber to the patient's medication 

regimen.  Minimize narcotics.  No surgical intervention is indicated at this 

time.

## 2018-07-12 LAB
ANION GAP SERPL CALC-SCNC: 12 MMOL/L (ref 5–19)
BUN SERPL-MCNC: 7 MG/DL (ref 7–20)
CALCIUM: 9.2 MG/DL (ref 8.4–10.2)
CHLORIDE SERPL-SCNC: 101 MMOL/L (ref 98–107)
CO2 SERPL-SCNC: 26 MMOL/L (ref 22–30)
ERYTHROCYTE [DISTWIDTH] IN BLOOD BY AUTOMATED COUNT: 15.1 % (ref 11.5–14)
GLUCOSE SERPL-MCNC: 95 MG/DL (ref 75–110)
HCT VFR BLD CALC: 32.9 % (ref 37.9–51)
HGB BLD-MCNC: 10.8 G/DL (ref 13.5–17)
MCH RBC QN AUTO: 28.9 PG (ref 27–33.4)
MCHC RBC AUTO-ENTMCNC: 32.9 G/DL (ref 32–36)
MCV RBC AUTO: 88 FL (ref 80–97)
PLATELET # BLD: 283 10^3/UL (ref 150–450)
POTASSIUM SERPL-SCNC: 4.4 MMOL/L (ref 3.6–5)
RBC # BLD AUTO: 3.74 10^6/UL (ref 4.35–5.55)
SODIUM SERPL-SCNC: 138.8 MMOL/L (ref 137–145)
WBC # BLD AUTO: 9.3 10^3/UL (ref 4–10.5)

## 2018-07-12 RX ADMIN — INSULIN LISPRO PRN UNIT: 100 INJECTION, SOLUTION INTRAVENOUS; SUBCUTANEOUS at 20:37

## 2018-07-12 RX ADMIN — INSULIN LISPRO PRN UNIT: 100 INJECTION, SOLUTION INTRAVENOUS; SUBCUTANEOUS at 17:18

## 2018-07-12 RX ADMIN — INSULIN GLARGINE SCH UNIT: 100 INJECTION, SOLUTION SUBCUTANEOUS at 20:42

## 2018-07-12 RX ADMIN — TAMSULOSIN HYDROCHLORIDE SCH MG: 0.4 CAPSULE ORAL at 17:16

## 2018-07-12 RX ADMIN — PHENAZOPYRIDINE HYDROCHLORIDE SCH MG: 100 TABLET ORAL at 06:50

## 2018-07-12 RX ADMIN — BACLOFEN PRN MG: 10 TABLET ORAL at 22:05

## 2018-07-12 RX ADMIN — PEDIATRIC MULTIPLE VITAMINS W/ IRON CHEW TAB 18 MG SCH TAB: 18 CHEW TAB at 11:01

## 2018-07-12 RX ADMIN — PHENAZOPYRIDINE HYDROCHLORIDE SCH MG: 100 TABLET ORAL at 13:03

## 2018-07-12 RX ADMIN — Medication SCH ML: at 13:03

## 2018-07-12 RX ADMIN — OXCARBAZEPINE SCH MG: 150 TABLET, FILM COATED ORAL at 20:35

## 2018-07-12 RX ADMIN — ASPIRIN 325 MG ORAL TABLET SCH MG: 325 PILL ORAL at 11:00

## 2018-07-12 RX ADMIN — FLUOXETINE SCH MG: 20 CAPSULE ORAL at 20:43

## 2018-07-12 RX ADMIN — SIMVASTATIN SCH MG: 40 TABLET, FILM COATED ORAL at 20:36

## 2018-07-12 RX ADMIN — LANSOPRAZOLE SCH MG: 15 TABLET, ORALLY DISINTEGRATING, DELAYED RELEASE ORAL at 16:38

## 2018-07-12 RX ADMIN — FLUOXETINE SCH MG: 20 CAPSULE ORAL at 11:08

## 2018-07-12 RX ADMIN — INSULIN LISPRO PRN UNIT: 100 INJECTION, SOLUTION INTRAVENOUS; SUBCUTANEOUS at 12:00

## 2018-07-12 RX ADMIN — LEVOTHYROXINE SODIUM SCH MG: 25 TABLET ORAL at 06:50

## 2018-07-12 RX ADMIN — Medication SCH ML: at 20:22

## 2018-07-12 RX ADMIN — BENZTROPINE MESYLATE SCH MG: 1 TABLET ORAL at 10:59

## 2018-07-12 RX ADMIN — SODIUM CHLORIDE PRN ML: 9 INJECTION, SOLUTION INTRAVENOUS at 10:58

## 2018-07-12 RX ADMIN — METOPROLOL SUCCINATE SCH MG: 25 TABLET, EXTENDED RELEASE ORAL at 11:04

## 2018-07-12 RX ADMIN — BUSPIRONE HYDROCHLORIDE SCH MG: 10 TABLET ORAL at 20:43

## 2018-07-12 RX ADMIN — FLUOXETINE SCH MG: 20 CAPSULE ORAL at 10:58

## 2018-07-12 RX ADMIN — Medication SCH ML: at 05:01

## 2018-07-12 RX ADMIN — OXCARBAZEPINE SCH MG: 150 TABLET, FILM COATED ORAL at 11:00

## 2018-07-12 RX ADMIN — POTASSIUM CHLORIDE SCH MEQ: 1.5 SOLUTION ORAL at 11:00

## 2018-07-12 RX ADMIN — OLANZAPINE SCH MG: 5 TABLET, FILM COATED ORAL at 20:43

## 2018-07-12 RX ADMIN — BUSPIRONE HYDROCHLORIDE SCH MG: 10 TABLET ORAL at 10:58

## 2018-07-12 RX ADMIN — LISINOPRIL SCH MG: 5 TABLET ORAL at 11:03

## 2018-07-12 RX ADMIN — BENZTROPINE MESYLATE SCH MG: 1 TABLET ORAL at 17:17

## 2018-07-12 RX ADMIN — BACLOFEN PRN MG: 10 TABLET ORAL at 11:08

## 2018-07-12 RX ADMIN — CEFTRIAXONE SODIUM SCH MG: 1 INJECTION, POWDER, FOR SOLUTION INTRAMUSCULAR; INTRAVENOUS at 16:10

## 2018-07-12 RX ADMIN — DOCUSATE SODIUM SCH MG: 100 CAPSULE, LIQUID FILLED ORAL at 11:05

## 2018-07-12 RX ADMIN — TIOTROPIUM BROMIDE SCH CAP: 18 CAPSULE ORAL; RESPIRATORY (INHALATION) at 11:02

## 2018-07-12 RX ADMIN — LANSOPRAZOLE SCH MG: 15 TABLET, ORALLY DISINTEGRATING, DELAYED RELEASE ORAL at 06:50

## 2018-07-12 RX ADMIN — DOCUSATE SODIUM SCH MG: 100 CAPSULE, LIQUID FILLED ORAL at 17:16

## 2018-07-12 NOTE — PDOC PROGRESS REPORT
<YANELISJESSICA YANG - Last Filed: 07/12/18 20:02>





Subjective


Progress Note for:: 07/12/18


Subjective:: 





TARA SCOTT is a 48 y.o. M with an extensive past medical history 

significant for MI without stents, CVA without deficits, hypertension, insulin-

dependent diabetes mellitus, hydrocephalus, chronic pain, bipolar and 

personality disorders who was admitted 7/9/18 for chest pain and hyponatremia.





Patient is seen on afternoon rounds with his significant other.  He is found 

resting in bed comfortably on room air eating his dinner.  The patient is alert 

and oriented 3 and able to answer all questions appropriately.  He has no 

complaints today. Denies dysuria, fever or chills, chest or back pain.  His 

sodium levels have remained stable on the low-dose Trileptal.  Currently 

waiting on urine culture and sensitivity results.


Reason For Visit: 


CHEST PAIN, HYPONATREMIA








Physical Exam


Vital Signs: 


 











Temp Pulse Resp BP Pulse Ox


 


 98.2 F   81   18   123/74   96 


 


 07/12/18 16:42  07/12/18 16:42  07/12/18 16:42  07/12/18 16:42  07/12/18 16:42








 Intake & Output











 07/11/18 07/12/18 07/13/18





 06:59 06:59 06:59


 


Intake Total 980 2759 1530


 


Output Total 2900 5150 900


 


Balance -1920 -2391 630


 


Weight 65.1 kg 65.2 kg 











General appearance: PRESENT: disheveled


Eye exam: PRESENT: conjunctiva pink, PERRLA


Mouth exam: PRESENT: moist


Teeth exam: PRESENT: poor dentation


Neck exam: PRESENT: full ROM


Respiratory exam: PRESENT: clear to auscultation david, symmetrical, unlabored


Cardiovascular exam: PRESENT: +S1, +S2


Pulses: PRESENT: normal radial pulses


GI/Abdominal exam: PRESENT: normal bowel sounds, soft.  ABSENT: tenderness


Rectal exam: PRESENT: deferred


Gentrourinary exam: PRESENT: indwelling catheter, other - williams cloudy urine


Extremities exam: PRESENT: full ROM


Musculoskeletal exam: PRESENT: ambulatory, full ROM


Neurological exam: PRESENT: alert, awake, oriented to person, oriented to place

, oriented to time, oriented to situation


Psychiatric exam: PRESENT: appropriate affect


Skin exam: PRESENT: dry, intact, normal color





Results


Laboratory Results: 


 





 07/12/18 05:45 





 07/12/18 05:45 





 











  07/12/18 07/12/18





  05:45 05:45


 


WBC  9.3 


 


RBC  3.74 L 


 


Hgb  10.8 L 


 


Hct  32.9 L 


 


MCV  88 


 


MCH  28.9 


 


MCHC  32.9 


 


RDW  15.1 H 


 


Plt Count  283 


 


Sodium   138.8


 


Potassium   4.4


 


Chloride   101


 


Carbon Dioxide   26


 


Anion Gap   12


 


BUN   7


 


Creatinine   0.76


 


Est GFR ( Amer)   > 60


 


Est GFR (Non-Af Amer)   > 60


 


Glucose   95


 


Calcium   9.2








 





07/10/18 22:05   Scott Catheter   Urine Culture - Final


                            3,000 col/ml








Impressions: 


 





Chest/Abdomen CTA  07/09/18 08:57


IMPRESSION:  No CT angio evidence of thoracic aortic dissection.  No thoracic 

aortic aneurysm.


Circumferential distal esophageal wall thickening, question reflux esophagitis.

  Sandhu's esophagus or esophageal neoplasm could not be excluded.


Old bilateral rib fractures.


 








Acute Abdomen Series  07/10/18 00:00


IMPRESSION:  Moderate ileus -probably nonobstructive however few air-fluid 

levels are noted on the right.


 








Head CT  07/10/18 00:00


IMPRESSION:


1.  No acute intracranial abnormality identified.


 


2.  Stable dilatation of the ventricular system.


 


This exam was performed according to our departmental


dose-optimization program, which includes automated exposure


control, adjustment of the mA and/or kV according to patient size


and/or use of iterative reconstruction technique.


 








Abdomen/Pelvis CT  07/11/18 00:00


IMPRESSION:  No CT evidence of bowel obstruction


 














Assessment & Plan





- Diagnosis


(1) Chest pain


  QualifierTitle:    Chest pain type: unspecified   Qualified Code(s): R07.9 - 

Chest pain, unspecified   


Is this a current diagnosis for this admission?: Yes   


Plan: 





Resolved; no further episodes of chest pain.  Discomfort was most likely GI/ 

in origin given his development of urinary retention and ileus.


The patient presented to the emergency department with crushing chest pain 

radiating to his neck and arm with uncomfortable pulsating sensation to his mid 

back.  He has multiple risk factors including previous MI, hypertension, 

diabetes, tobacco use.


EKG demonstrated normal sinus rhythm without ST segment elevation or depression.


Chest x-ray is benign.


Troponin negative x 3


Reviewed cardiac stress test done at the patient's outpatient cardiologist, Dr. Montana, in February 2018: Post stress LVEF 35%, small anterior apical 

nonreversible area of ischemia secondary to scar noted, left ventricle 

hypokinesis.


CTA of the chest ruled out thoracic aneurysm.


Echocardiogram at [facility] benign; LVEF > 60%, mild diastolic dysfunction, mild 

pulmonary hypertension.





The patient is admitted to the medical floor on continuous cardiac telemetry.


Continue PPI.


Nitroglycerin tabs 3 as needed for chest pain.














(2) Urinary retention


Is this a current diagnosis for this admission?: Yes   


Plan: 





Secondary to urinary tract infection.


Patient typically self caths every 6 hours but has been requesting nursing 

staff to do in and out caths hourly.





Scott catheter is placed.


The patient is placed on Flomax.  He will need to follow-up with his 

established urologist after discharge.











(3) Hyponatremia


Is this a current diagnosis for this admission?: Yes   


Plan: 





Resolved; sodium currently 136


Patient with sodium of 120 on admission; baseline hyponatremia of 127.  This 

likely secondary to numerous medications contributing to hyponatremia.  Most 

notably, the patient's Trileptal was increased 1 week ago from 150 mg BID to 

300mg BID


We will trend serial chemistries.


Seizure precautions.


Low-dose Trileptal resumed; 150 mg p.o. twice daily.  The patient and family 

member were strongly cautioned that they will require repeat lab evaluation 1 

week following discharge as well as follow-up with his neurologist to monitor 

hyponatremia and adjust medications further as necessary.











(4) Anemia


  QualifierTitle:    Anemia type: iron deficiency   Iron deficiency anemia type

: inadequate dietary iron intake   Qualified Code(s): D50.8 - Other iron 

deficiency anemias   


Is this a current diagnosis for this admission?: Yes   


Plan: 





Chronic anemia; improved today.


Hemoglobin at baseline of 12-13.


No evidence of bleeding.  Vital signs are stable.


Continue multivitamin with iron.











(5) COPD (chronic obstructive pulmonary disease)


Is this a current diagnosis for this admission?: Yes   


Plan: 





Stable; without exacerbation.  


Chest x-ray is clear.


Continue the patient's home dose Spiriva.


Supplemental oxygen as needed to maintain saturations > 88%.


As needed nebulizer treatments.











(6) Nicotine abuse


Is this a current diagnosis for this admission?: Yes   


Plan: 





Previous smoker with 30-pack-year history.  Now the high-dose nicotine.


Nicotine cessation is encouraged.  NicoDerm patch is provided.








(7) Urinary tract infection


  QualifierTitle:    Urinary tract infection type: site unspecified   Hematuria 

presence: without hematuria   Qualified Code(s): N39.0 - Urinary tract infection

, site not specified   


Is this a current diagnosis for this admission?: Yes   


Plan: 


Urinalysis indicative of UTI.


Patient requires self-catheterization 6-8 times per day at home.


High risk for complicated UTI


Urine cultures pending


Continue IV Rocephin for empiric antibiotic coverage


Discontinue Pyridium, patient no longer complains of bladder spasms, has 

received medication for 48 hours -avoid myoglobinemia.








(8) Hypothyroid


Is this a current diagnosis for this admission?: Yes   


Plan: 





TSH is high; 4.89.


Per patient and significant other, he has not been told that he has 

hypothyroidism.





Continue levothyroxine 25 mcg daily.


The patient will require follow-up labs in 6 weeks.














(9) Diabetes


  QualifierTitle:    Diabetes mellitus type: type 2   Diabetes mellitus long 

term insulin use: with long term use   Diabetes mellitus complication status: 

with kidney complications   Diabetes mellitus complication detail: with chronic 

kidney disease   Chronic kidney disease stage: stage 2 (mild)   Qualified Code(s

): E11.22 - Type 2 diabetes mellitus with diabetic chronic kidney disease; 

N18.2 - Chronic kidney disease, stage 2 (mild); N18.2 - Chronic kidney disease, 

stage 2 (mild); Z79.4 - Long term (current) use of insulin; Z79.4 - Long term (

current) use of insulin; Z79.4 - Long term (current) use of insulin; Z79.4 - 

Long term (current) use of insulin   


Is this a current diagnosis for this admission?: Yes   


Plan: 





Appropriate blood glucose control with current regimen.


The patient is placed on a consistent carb diet.


Accu-Cheks before meals and at bedtime with Humalog for sliding scale coverage.


Continue the patient's home dose Lantus.














- Time


Time Spent with patient: 15-24 minutes


Medications reviewed and adjusted accordingly: Yes


Anticipated discharge: Home


Within: within 72 hours





- Inpatient Certification


Based on my medical assessment, after consideration of the patient's 

comorbidities, presenting symptoms, or acuity I expect that the services needed 

warrant INPATIENT care.: Yes


I certify that my determination is in accordance with my understanding of 

Medicare's requirements for reasonable and necessary INPATIENT services [42 CFR 

412.3e].: Yes


Medical Necessity: Need for IV Antibiotics





- Plan Summary


Plan Summary: 


Continue IV antibiotics








<SWAYZE,DU M - Last Filed: 07/13/18 07:50>





Subjective


Reason For Visit: 


CHEST PAIN, HYPONATREMIA








Physical Exam


Vital Signs: 


 











Temp Pulse Resp BP Pulse Ox


 


 98.4 F   66   19   136/76 H  97 


 


 07/13/18 00:00  07/13/18 02:00  07/13/18 00:00  07/13/18 00:00  07/13/18 00:00








 Intake & Output











 07/12/18 07/13/18 07/14/18





 06:59 06:59 06:59


 


Intake Total 2759 1852 


 


Output Total 5150 1950 


 


Balance -2391 -98 


 


Weight 65.2 kg 63.4 kg 














Results


Laboratory Results: 


 





 07/12/18 05:45 





 07/12/18 05:45 





 





07/10/18 22:05   Scott Catheter   Urine Culture - Final


                            3,000 col/ml








Impressions: 


 





Chest/Abdomen CTA  07/09/18 08:57


IMPRESSION:  No CT angio evidence of thoracic aortic dissection.  No thoracic 

aortic aneurysm.


Circumferential distal esophageal wall thickening, question reflux esophagitis.

  Sandhu's esophagus or esophageal neoplasm could not be excluded.


Old bilateral rib fractures.


 








Acute Abdomen Series  07/10/18 00:00


IMPRESSION:  Moderate ileus -probably nonobstructive however few air-fluid 

levels are noted on the right.


 








Head CT  07/10/18 00:00


IMPRESSION:


1.  No acute intracranial abnormality identified.


 


2.  Stable dilatation of the ventricular system.


 


This exam was performed according to our departmental


dose-optimization program, which includes automated exposure


control, adjustment of the mA and/or kV according to patient size


and/or use of iterative reconstruction technique.


 








Abdomen/Pelvis CT  07/11/18 00:00


IMPRESSION:  No CT evidence of bowel obstruction


 














Provider Note


Provider Note: 





I have discussed this patient in detail with SHALONDA Alex. I am in agreement with 

her evaluation and plan.

## 2018-07-13 VITALS — DIASTOLIC BLOOD PRESSURE: 79 MMHG | SYSTOLIC BLOOD PRESSURE: 140 MMHG

## 2018-07-13 LAB
ERYTHROCYTE [DISTWIDTH] IN BLOOD BY AUTOMATED COUNT: 15 % (ref 11.5–14)
HCT VFR BLD CALC: 35.3 % (ref 37.9–51)
HGB BLD-MCNC: 12 G/DL (ref 13.5–17)
MCH RBC QN AUTO: 29.7 PG (ref 27–33.4)
MCHC RBC AUTO-ENTMCNC: 34 G/DL (ref 32–36)
MCV RBC AUTO: 87 FL (ref 80–97)
PLATELET # BLD: 282 10^3/UL (ref 150–450)
RBC # BLD AUTO: 4.05 10^6/UL (ref 4.35–5.55)
WBC # BLD AUTO: 9.7 10^3/UL (ref 4–10.5)

## 2018-07-13 RX ADMIN — Medication SCH ML: at 06:34

## 2018-07-13 RX ADMIN — BUSPIRONE HYDROCHLORIDE SCH MG: 10 TABLET ORAL at 09:17

## 2018-07-13 RX ADMIN — LISINOPRIL SCH MG: 5 TABLET ORAL at 09:20

## 2018-07-13 RX ADMIN — SODIUM CHLORIDE PRN ML: 9 INJECTION, SOLUTION INTRAVENOUS at 06:40

## 2018-07-13 RX ADMIN — FLUOXETINE SCH MG: 20 CAPSULE ORAL at 09:20

## 2018-07-13 RX ADMIN — TIOTROPIUM BROMIDE SCH CAP: 18 CAPSULE ORAL; RESPIRATORY (INHALATION) at 09:23

## 2018-07-13 RX ADMIN — LEVOTHYROXINE SODIUM SCH MG: 25 TABLET ORAL at 06:43

## 2018-07-13 RX ADMIN — PEDIATRIC MULTIPLE VITAMINS W/ IRON CHEW TAB 18 MG SCH TAB: 18 CHEW TAB at 09:22

## 2018-07-13 RX ADMIN — METOPROLOL SUCCINATE SCH MG: 25 TABLET, EXTENDED RELEASE ORAL at 09:16

## 2018-07-13 RX ADMIN — CEFTRIAXONE SODIUM SCH MG: 1 INJECTION, POWDER, FOR SOLUTION INTRAMUSCULAR; INTRAVENOUS at 15:42

## 2018-07-13 RX ADMIN — IPRATROPIUM BROMIDE AND ALBUTEROL SULFATE PRN ML: 2.5; .5 SOLUTION RESPIRATORY (INHALATION) at 11:11

## 2018-07-13 RX ADMIN — OXCARBAZEPINE SCH MG: 150 TABLET, FILM COATED ORAL at 09:21

## 2018-07-13 RX ADMIN — Medication SCH ML: at 13:33

## 2018-07-13 RX ADMIN — BENZTROPINE MESYLATE SCH MG: 1 TABLET ORAL at 09:16

## 2018-07-13 RX ADMIN — FLUOXETINE SCH MG: 20 CAPSULE ORAL at 09:26

## 2018-07-13 RX ADMIN — LANSOPRAZOLE SCH MG: 15 TABLET, ORALLY DISINTEGRATING, DELAYED RELEASE ORAL at 06:43

## 2018-07-13 RX ADMIN — POTASSIUM CHLORIDE SCH MEQ: 1.5 SOLUTION ORAL at 09:26

## 2018-07-13 RX ADMIN — ASPIRIN 325 MG ORAL TABLET SCH MG: 325 PILL ORAL at 09:16

## 2018-07-13 RX ADMIN — DOCUSATE SODIUM SCH MG: 100 CAPSULE, LIQUID FILLED ORAL at 09:18

## 2018-07-13 NOTE — PROGRESS NOTE
Provider Note


Provider Note: 


ID Consult Note





Asked to review patient's chart and discussed case with Kim Alex NP, 

via telephone. Pt not seen or examined.





Mr. Lee has a PMH including spina bifida, neurogenic bladder requiring self 

catheterization, migraine HA, DM, CVA, MI, HTN, chronic pain, bipolar d/o, 

personality d/o admitted with c/o chest pain on 7/9/18 who was also found to be 

hyponatremic. He initially had no complaints of fever or chills, abdominal pain 

or dysuria. On 7/10, he complained of abdominal discomfort from bladder spasms 

and an increased need to urinate.  He was found to have urinary retention, and 

a Scott was placed. He also ad an acute abdominal series on 7/10 that was read 

as showing moderate ileus. He had a U/A that showed large leukocyte esterase on 

7/9. On 7/10, a Rocephin was started empirically and a few hours later a urine 

culture was sent showing growth of 3k cfu of bacteria. The patient has not had 

any urinary or abdominal complaints since then. He has had no fever or 

leukocytosis during his admission . Patient is ready for discharge home. 





Input was requested regarding oral antibiotic therapy for complicated UTI in 

light of lack of microbiological information.





impression/recommendations


UTI diagnosis requires compatible signs and symptoms in addition to bacterial 

growth in the urine. Leukocyte esterase on a U/A by itself does not allow for 

diagnosis of a UTI since it has poor specificity. The patient had a low number 

of colony forming units that were isolated in the lab, but in someone with a 

neurogenic bladder who requires intermittent catheterization, a lower threshold 

is often used in diagnosing a UTI. It is also possible that he may have had an 

alternative explanation for his abdominal discomfort as he was noted to have 

increased eructation and flatulence and abdominal distention and an acute 

abdominal series showing evidence of an ileus - suggesting a GI rather than  

source of discomfort.   





This is not a straightforward case. Whether or not the patient has a UTI is a 

matter of clinical judgment, taking the above information together. 





If there is strong suspicion given the above that he has a UTI, continuing an 

empiric oral cephalosporin with similar activity to Rocephin could be 

reasonable such as Vantin po 200 mg x 7 additional days or Suprax 400 mg daily 

x 7 days to complete treatment for complicated UTI. However, the adverse 

effects that are associated with antibiotic use, including potential risk for C 

difficile, have to be kept in mind. If there is enough doubt regarding the 

etiology of the patient's abdominal discomfort on 7/10 (given the acute 

abdominal series showing ileus), it may also be reasonable to discontinue 

antibiotic therapy, as long as the patient can have close follow up to make 

sure that he remains asymptomatic as an outpatient.  If he has recurrent 

symptoms that are suggest of a UTI and no other etiology (as was not the case 

on 7/10), repeat UCx could be performed at that time in conjunction with 

restarting empiric antibiotic therapy. Routine repeat urinalysis and UCx in 

absence of suggestive symptoms is not recommended since it would detect, by 

definition, asymptomatic bacteriuria that does not require treatment (in 

absence of pregnancy or pending invasive urologic procedure with anticipated 

mucosal bleeding).





John Koenig MD


[facility] Infectious Diseases


pager [phone]

## 2018-08-15 ENCOUNTER — HOSPITAL ENCOUNTER (EMERGENCY)
Dept: HOSPITAL 62 - ER | Age: 48
Discharge: HOME | End: 2018-08-15
Payer: MEDICAID

## 2018-08-15 VITALS — SYSTOLIC BLOOD PRESSURE: 144 MMHG | DIASTOLIC BLOOD PRESSURE: 80 MMHG

## 2018-08-15 DIAGNOSIS — J44.9: ICD-10-CM

## 2018-08-15 DIAGNOSIS — R10.32: ICD-10-CM

## 2018-08-15 DIAGNOSIS — E10.9: ICD-10-CM

## 2018-08-15 DIAGNOSIS — Z87.891: ICD-10-CM

## 2018-08-15 DIAGNOSIS — R10.31: ICD-10-CM

## 2018-08-15 DIAGNOSIS — K59.00: ICD-10-CM

## 2018-08-15 DIAGNOSIS — I50.9: ICD-10-CM

## 2018-08-15 DIAGNOSIS — T83.511A: Primary | ICD-10-CM

## 2018-08-15 DIAGNOSIS — K58.9: ICD-10-CM

## 2018-08-15 DIAGNOSIS — R11.0: ICD-10-CM

## 2018-08-15 DIAGNOSIS — I25.10: ICD-10-CM

## 2018-08-15 LAB
ADD MANUAL DIFF: NO
ALBUMIN SERPL-MCNC: 4.3 G/DL (ref 3.5–5)
ALP SERPL-CCNC: 108 U/L (ref 38–126)
ALT SERPL-CCNC: 22 U/L (ref 21–72)
ANION GAP SERPL CALC-SCNC: 17 MMOL/L (ref 5–19)
APPEARANCE UR: (no result)
APTT PPP: YELLOW S
AST SERPL-CCNC: 20 U/L (ref 17–59)
BASOPHILS # BLD AUTO: 0.1 10^3/UL (ref 0–0.2)
BASOPHILS NFR BLD AUTO: 1.1 % (ref 0–2)
BILIRUB DIRECT SERPL-MCNC: 0.4 MG/DL (ref 0–0.4)
BILIRUB SERPL-MCNC: 0.4 MG/DL (ref 0.2–1.3)
BILIRUB UR QL STRIP: NEGATIVE
BUN SERPL-MCNC: 10 MG/DL (ref 7–20)
CALCIUM: 9.3 MG/DL (ref 8.4–10.2)
CHLORIDE SERPL-SCNC: 93 MMOL/L (ref 98–107)
CO2 SERPL-SCNC: 22 MMOL/L (ref 22–30)
EOSINOPHIL # BLD AUTO: 0.4 10^3/UL (ref 0–0.6)
EOSINOPHIL NFR BLD AUTO: 3.5 % (ref 0–6)
ERYTHROCYTE [DISTWIDTH] IN BLOOD BY AUTOMATED COUNT: 15.9 % (ref 11.5–14)
GLUCOSE SERPL-MCNC: 70 MG/DL (ref 75–110)
GLUCOSE UR STRIP-MCNC: NEGATIVE MG/DL
HCT VFR BLD CALC: 33 % (ref 37.9–51)
HGB BLD-MCNC: 11.1 G/DL (ref 13.5–17)
KETONES UR STRIP-MCNC: NEGATIVE MG/DL
LIPASE SERPL-CCNC: 17.8 U/L (ref 23–300)
LYMPHOCYTES # BLD AUTO: 2.7 10^3/UL (ref 0.5–4.7)
LYMPHOCYTES NFR BLD AUTO: 21.9 % (ref 13–45)
MCH RBC QN AUTO: 28.1 PG (ref 27–33.4)
MCHC RBC AUTO-ENTMCNC: 33.5 G/DL (ref 32–36)
MCV RBC AUTO: 84 FL (ref 80–97)
MONOCYTES # BLD AUTO: 1.2 10^3/UL (ref 0.1–1.4)
MONOCYTES NFR BLD AUTO: 9.7 % (ref 3–13)
NEUTROPHILS # BLD AUTO: 7.9 10^3/UL (ref 1.7–8.2)
NEUTS SEG NFR BLD AUTO: 63.8 % (ref 42–78)
NITRITE UR QL STRIP: NEGATIVE
PH UR STRIP: 6 [PH] (ref 5–9)
PLATELET # BLD: 304 10^3/UL (ref 150–450)
POTASSIUM SERPL-SCNC: 3.8 MMOL/L (ref 3.6–5)
PROT SERPL-MCNC: 7.4 G/DL (ref 6.3–8.2)
PROT UR STRIP-MCNC: 30 MG/DL
RBC # BLD AUTO: 3.94 10^6/UL (ref 4.35–5.55)
SODIUM SERPL-SCNC: 132.2 MMOL/L (ref 137–145)
SP GR UR STRIP: 1
TOTAL CELLS COUNTED % (AUTO): 100 %
UROBILINOGEN UR-MCNC: NEGATIVE MG/DL (ref ?–2)
WBC # BLD AUTO: 12.4 10^3/UL (ref 4–10.5)

## 2018-08-15 PROCEDURE — 99284 EMERGENCY DEPT VISIT MOD MDM: CPT

## 2018-08-15 PROCEDURE — 87088 URINE BACTERIA CULTURE: CPT

## 2018-08-15 PROCEDURE — 87086 URINE CULTURE/COLONY COUNT: CPT

## 2018-08-15 PROCEDURE — 36415 COLL VENOUS BLD VENIPUNCTURE: CPT

## 2018-08-15 PROCEDURE — 74177 CT ABD & PELVIS W/CONTRAST: CPT

## 2018-08-15 PROCEDURE — 96375 TX/PRO/DX INJ NEW DRUG ADDON: CPT

## 2018-08-15 PROCEDURE — 83690 ASSAY OF LIPASE: CPT

## 2018-08-15 PROCEDURE — 96361 HYDRATE IV INFUSION ADD-ON: CPT

## 2018-08-15 PROCEDURE — 96365 THER/PROPH/DIAG IV INF INIT: CPT

## 2018-08-15 PROCEDURE — 85025 COMPLETE CBC W/AUTO DIFF WBC: CPT

## 2018-08-15 PROCEDURE — 80053 COMPREHEN METABOLIC PANEL: CPT

## 2018-08-15 PROCEDURE — 87186 SC STD MICRODIL/AGAR DIL: CPT

## 2018-08-15 PROCEDURE — 81001 URINALYSIS AUTO W/SCOPE: CPT

## 2018-08-15 NOTE — ER DOCUMENT REPORT
ED General





- General


Chief Complaint: Abdominal Pain


Stated Complaint: ABDOMINAL PAIN


Time Seen by Provider: 08/15/18 06:05


Mode of Arrival: Ambulatory


Information source: Patient, Relative


Notes: 





48-year-old male with congestive heart failure, coronary artery disease, type 1 

diabetes, IBS presents with complaint of abdominal pain that started 1 day 

prior to arrival.  Pain is located in the right and left lower abdomen and 

described as a constant stabbing pain.  Patient has had associated nausea 

without vomiting.  His last bowel movement was yesterday.  He denies any black 

or bloody stools.  He denies sick contacts, recent antibiotic use or recent 

travel.  He states he has been compliant with his diabetic medications and that 

his sugars have been in normal ranges.


TRAVEL OUTSIDE OF THE [place] IN LAST 30 DAYS: No





- HPI


Onset: Yesterday


Onset/Duration: Gradual, Persistent


Quality of pain: Stabbing


Severity: Moderate


Associated symptoms: Nausea.  denies: Chest pain, Diarrhea, Fever, Vomiting, 

Shortness of breath


Exacerbated by: Movement


Relieved by: Denies


Similar symptoms previously: Yes


Recently seen / treated by doctor: No





- Related Data


Allergies/Adverse Reactions: 


 





bee pollen [Bee Pollen] Allergy (Verified 08/15/18 08:55)


 


ketorolac [From Toradol] Allergy (Verified 08/15/18 08:55)


 


venom-wasp Allergy (Verified 08/15/18 08:55)


 











Past Medical History





- General


Information source: Patient





- Social History


Smoking Status: Former Smoker


Chew tobacco use (# tins/day): No


Frequency of alcohol use: Rare


Drug Abuse: None


Lives with: Spouse/Significant other


Family History: Reviewed & Not Pertinent, COPD, DM


Patient has suicidal ideation: No


Patient has homicidal ideation: No





- Past Medical History


Cardiac Medical History: Reports: Hx Congestive Heart Failure, Hx Coronary 

Artery Disease, Hx Heart Attack, Hx Hypertension


Pulmonary Medical History: Reports: Hx Bronchitis, Hx COPD, Hx Pneumonia


Neurological Medical History: Reports: Hx Cerebrovascular Accident, Hx Migraine

, Hx Seizures


Endocrine Medical History: Reports: Hx Diabetes Mellitus Type 1


Renal/ Medical History: Reports: Hx Benign Prostatic Hyperplasia, Hx Kidney 

Stones.  Denies: Hx Peritoneal Dialysis


GI Medical History: Reports: Hx Gastroesophageal Reflux Disease, Hx Ulcer


Musculoskeletal Medical History: Reports Hx Arthritis


Psychiatric Medical History: Reports: Hx Anxiety - YES,CONTROLLED, Hx Attention 

Deficit Hyperactivity Disorder, Hx Depression, Hx Personality Disorder


Past Surgical History: Reports: Hx Open Heart Surgery - correction of heart 

murmur, Hx Orthopedic Surgery - partial left hip replacement, Hx Tonsillectomy, 

Other - Ventral septal defect repair as infant





- Immunizations


Hx Diphtheria, Pertussis, Tetanus Vaccination: No - unk


Hx Pneumococcal Vaccination: 01/01/15





Review of Systems





- Review of Systems


Notes: 





REVIEW OF SYSTEMS:


CONSTITUTIONAL :  Denies fever,  chills, or sweats.  Denies recent illness. 

Denies weight loss, recent hospitalizations. 


EENT: Denies visual changes, eye pain.    Denies nasal or sinus congestion or 

discharge.  Denies sore throat, oral lesions, difficulty swallowing.


CARDIOVASCULAR:  Denies chest pain.  Denies palpitations. Denies lower 

extremity edema.


RESPIRATORY:  Denies cough, cold, or chest congestion.  Denies shortness of 

breath, wheezing.


GASTROINTESTINAL:  denies vomiting, or diarrhea.  Denies blood in vomitus, 

stools, or per rectum.  Denies black, tarry stools.  Denies constipation.  


GENITOURINARY:  Denies difficulty urinating, painful urination,  frequency, 

blood in urine, or  vaginal discharge.


MUSCULOSKELETAL:  Denies back or neck pain or stiffness.  Denies joint pain or 

swelling.


SKIN:   Denies rash, lesions or sores.


HEMATOLOGIC :   Denies easy bruising or bleeding.


LYMPHATIC:  Denies swollen glands.


NEUROLOGICAL:  Denies confusion or altered mental status.  Denies passing out 

or loss of consciousness.  Denies dizziness or lightheadedness.  Denies 

headache.  Denies weakness or paralysis.  Denies problems difficulty with 

ambulation, slurred speech.  Denies sensory loss, numbness, or tingling.  

Denies seizures.


PSYCHIATRIC:  Denies anxiety or stress.  Denies depression, suicidal ideation, 

or homicidal ideation.  Denies visual or auditory hallucinations.








Physical Exam





- Vital signs


Vitals: 


 











Temp Pulse Resp BP Pulse Ox


 


 97.8 F   98   18   141/74 H  98 


 


 08/15/18 04:44  08/15/18 04:44  08/15/18 04:44  08/15/18 04:44  08/15/18 04:44














- Notes


Notes: 





PHYSICAL EXAMINATION:





GENERAL: Well-appearing, well-nourished and in no acute distress.





HEAD: Atraumatic, normocephalic.





EYES: Pupils equal round and reactive to light, extraocular movements intact, 

sclera anicteric, conjunctiva are normal.





ENT: Nares patent, oropharynx clear without exudates.  Moist mucous membranes.





NECK: Normal range of motion, supple without lymphadenopathy





LUNGS: Breath sounds clear to auscultation bilaterally and equal.  No wheezes 

rales or rhonchi.





HEART: Regular rate and rhythm without murmurs





ABDOMEN: Diffuse abdominal tenderness.  Abdominal distention.  No guarding, no 

rebound.  No masses appreciated.





Musculoskeletal: Normal range of motion, no pitting or edema.  No cyanosis.





NEUROLOGICAL: Cranial nerves grossly intact.  Normal speech, normal gait.  

Normal sensory, motor exams 





PSYCH: Normal mood, normal affect.





SKIN: Warm, Dry, normal turgor, no rashes or lesions noted.





Course





- Re-evaluation


Re-evalutation: 








Laboratory











  08/15/18 08/15/18 08/15/18





  06:30 06:30 06:50


 


WBC  12.4 H  


 


RBC  3.94 L  


 


Hgb  11.1 L  


 


Hct  33.0 L  


 


MCV  84  


 


MCH  28.1  


 


MCHC  33.5  


 


RDW  15.9 H  


 


Plt Count  304  


 


Seg Neutrophils %  63.8  


 


Lymphocytes %  21.9  


 


Monocytes %  9.7  


 


Eosinophils %  3.5  


 


Basophils %  1.1  


 


Absolute Neutrophils  7.9  


 


Absolute Lymphocytes  2.7  


 


Absolute Monocytes  1.2  


 


Absolute Eosinophils  0.4  


 


Absolute Basophils  0.1  


 


Sodium   132.2 L 


 


Potassium   3.8 


 


Chloride   93 L 


 


Carbon Dioxide   22 


 


Anion Gap   17 


 


BUN   10 


 


Creatinine   1.03 


 


Est GFR ( Amer)   > 60 


 


Est GFR (Non-Af Amer)   > 60 


 


Glucose   70 L 


 


Calcium   9.3 


 


Total Bilirubin   0.4 


 


Direct Bilirubin   0.4 


 


Neonat Total Bilirubin   Not Reportable 


 


Neonat Direct Bilirubin   Not Reportable 


 


Neonat Indirect Bili   Not Reportable 


 


AST   20 


 


ALT   22 


 


Alkaline Phosphatase   108 


 


Total Protein   7.4 


 


Albumin   4.3 


 


Lipase   17.8 L 


 


Urine Color    YELLOW


 


Urine Appearance    CLOUDY


 


Urine pH    6.0


 


Ur Specific Gravity    1.004


 


Urine Protein    30 H


 


Urine Glucose (UA)    NEGATIVE


 


Urine Ketones    NEGATIVE


 


Urine Blood    MODERATE H


 


Urine Nitrite    NEGATIVE


 


Urine Bilirubin    NEGATIVE


 


Urine Urobilinogen    NEGATIVE


 


Ur Leukocyte Esterase    LARGE H


 


Urine WBC (Auto)    >182


 


Urine RBC (Auto)    9


 


Urine Bacteria (Auto)    1+


 


Urine WBC Clumps    MANY


 


Urine Mucus (Auto)    RARE


 


Urine Ascorbic Acid    NEGATIVE











08/15/18 10:12


48-year-old male with congestive heart failure, coronary artery disease, type 1 

diabetes, IBS presents with complaint of abdominal pain that started 1 day 

prior to arrival.  Pain is located in the right and left lower abdomen and 

described as a constant stabbing pain.  Patient has had associated nausea 

without vomiting.  His last bowel movement was yesterday.  He denies any black 

or bloody stools.  Patient was seen by myself upon arrival.  Vital signs were 

reviewed. Patient is afebrile, normotensive and not hypoxic.  Patient does not 

appear toxic or dehydrated.  They are in no acute distress.  Previous medical 

records and nursing notes reviewed.  Significant findings include CBC with mild 

leukocytosis and anemia.  CMP is without electrolyte abnormalities.  Urinalysis 

consistent with urinary tract infection.  Unclear whether this is chronic due 

to patient's self catheterization secondary to neurogenic bladder.  Patient did 

receive Rocephin, IV fluids, morphine and Zofran.  CT of the abdomen was 

obtained and showed no acute process but did show a moderate amount of stool.  

Patient will be prescribed Keflex, Colace.  Patient provided the opportunity to 

ask questions, and express concerns.  Discharge instructions discussed. Patient 

is agreeable with discharge home.  Return indications explained and discussed 

with the patient who displays understanding.  Patient encouraged to return to 

the emergency department immediately with any concerns.


08/15/18 10:12








- Vital Signs


Vital signs: 


 











Temp Pulse Resp BP Pulse Ox


 


 97.8 F   98   18   141/74 H  98 


 


 08/15/18 04:44  08/15/18 04:44  08/15/18 04:44  08/15/18 04:44  08/15/18 04:44














- Laboratory


Result Diagrams: 


 08/15/18 06:30





 08/15/18 06:30


Laboratory results interpreted by me: 


 











  08/15/18 08/15/18 08/15/18





  06:30 06:30 06:50


 


WBC  12.4 H  


 


RBC  3.94 L  


 


Hgb  11.1 L  


 


Hct  33.0 L  


 


RDW  15.9 H  


 


Sodium   132.2 L 


 


Chloride   93 L 


 


Glucose   70 L 


 


Lipase   17.8 L 


 


Urine Protein    30 H


 


Urine Blood    MODERATE H


 


Ur Leukocyte Esterase    LARGE H














- Diagnostic Test


Radiology reviewed: Image reviewed, Reports reviewed





Discharge





- Discharge


Clinical Impression: 


Abdominal pain


Qualifiers:


 Abdominal location: unspecified location Qualified Code(s): R10.9 - 

Unspecified abdominal pain





Constipation


Qualifiers:


 Constipation type: unspecified constipation type Qualified Code(s): K59.00 - 

Constipation, unspecified





UTI (urinary tract infection)


Qualifiers:


 Urinary tract infection type: catheter-associated UTI Indwelling urinary 

catheter type: unspecified Encounter type: initial encounter Qualified Code(s): 

T83.511A - Infection and inflammatory reaction due to indwelling urethral 

catheter, initial encounter





Condition: Good


Disposition: HOME, SELF-CARE


Instructions:  Abdominal Pain ([facility]), Antinausea Medication ([facility]), Constipation (

[facility]), Urinary Tract Infection ([facility])


Prescriptions: 


Cephalexin Monohydrate [Keflex 500 mg Capsule] 500 mg PO BID 10 Days #20 capsule


Docusate Sodium [Colace 100 mg Capsule] 100 mg PO DAILY #10 capsule


Ondansetron [Zofran Odt 4 mg Tablet] 1 - 2 tab PO Q4H PRN #15 tab.rapdis


 PRN Reason: For Nausea/Vomiting


Polyethylene Glycol 3350 [Miralax Powder 17 gm/Packet] 1 packet PO DAILY #10 pkg


Forms:  Elevated Blood Pressure


Referrals: 


CRISTIAN BRINK MD [Primary Care Provider] - Follow up as needed

## 2018-08-15 NOTE — RADIOLOGY REPORT (SQ)
EXAM DESCRIPTION:  CT ABD/PELVIS WITH IV ONLY



COMPLETED DATE/TIME:  8/15/2018 8:46 am



REASON FOR STUDY:  Lower abdominal pain



COMPARISON:  7/11/2018



TECHNIQUE:  CT scan of the abdomen and pelvis performed using helical scanning technique with dynamic
 intravenous contrast injection.  No oral contrast. Images reviewed with lung, soft tissue, and bone 
windows. Reconstructed coronal and sagittal MPR images reviewed. Delayed images for evaluation of the
 urinary system also acquired. All images stored on PACS.

All CT scanners at this facility use dose modulation, iterative reconstruction, and/or weight based d
osing when appropriate to reduce radiation dose to as low as reasonably achievable (ALARA).

[facility]: Dose Right  [facility]: CareDose    [facility]: Dose Right    [facility]: Teradose 4D    [facility]: [employer] Technologies



CONTRAST TYPE AND DOSE:  contrast/concentration: Isovue 350.00 mg/ml; Total Contrast Delivered: 65.0 
ml; Total Saline Delivered: 57.7 ml

65 mL Omnipaque 350- low osmolar.



RENAL FUNCTION:  Creatinine 1.03



RADIATION DOSE:  CT Rad equipment meets quality standard of care and radiation dose reduction techniq
ues were employed. CTDIvol: 6.2 - 8.5 mGy. DLP: 754 mGy-cm..



LIMITATIONS:  None.



FINDINGS:  LOWER CHEST: Scattered ground-glass opacity and nodular infiltrate similar to that of on t
he previous CT scan of 7/11/2018.

LIVER: Normal size. No masses.  No dilated ducts.

SPLEEN: Normal size. No focal lesions.

PANCREAS: No masses. No significant calcifications. No adjacent inflammation or peripancreatic fluid 
collections. Pancreatic duct not dilated.

GALLBLADDER: No identified stones by CT criteria. No inflammatory changes to suggest cholecystitis.

ADRENAL GLANDS: No significant masses or asymmetry.

RIGHT KIDNEY AND URETER: No solid masses.   No significant calcifications.   No hydronephrosis or hyd
roureter.

LEFT KIDNEY AND URETER: No solid masses.   No significant calcifications.   No hydronephrosis or hydr
oureter.

AORTA AND VESSELS: No aneurysm. No dissection. Renal arteries, SMA, celiac without stenosis.

RETROPERITONEUM: No retroperitoneal adenopathy, hemorrhage or masses.

BOWEL AND PERITONEAL CAVITY: No masses or inflammatory changes. No free fluid or peritoneal masses.

APPENDIX: Not visualized.

PELVIS: No mass.  No free fluid. Normal bladder.  Scatter artifact from bilateral hip hardware

ABDOMINAL WALL: No masses. No hernias.

BONES: Stable chronic deformity of the superior cortical endplates of lower thoracic and upper lumbar
 spine.

OTHER: No other significant finding.



IMPRESSION:  Persistent bibasilar infiltrate and ground-glass opacity as previously noted.  No acute 
or significant abdominal or pelvic pathology.  Moderate quantity of stool.



TECHNICAL DOCUMENTATION:  JOB ID:  [id number]

Quality ID # [id number]: Final reports with documentation of one or more dose reduction techniques (e.g., Au
tomated exposure control, adjustment of the mA and/or kV according to patient size, use of iterative 
reconstruction technique)

 2011 [employer]- All Rights Reserved



Reading location - IP/workstation name: [email or web address]

## 2018-09-27 ENCOUNTER — HOSPITAL ENCOUNTER (EMERGENCY)
Dept: HOSPITAL 62 - ER | Age: 48
Discharge: HOME | End: 2018-09-27
Payer: MEDICAID

## 2018-09-27 VITALS — DIASTOLIC BLOOD PRESSURE: 77 MMHG | SYSTOLIC BLOOD PRESSURE: 137 MMHG

## 2018-09-27 DIAGNOSIS — E87.5: ICD-10-CM

## 2018-09-27 DIAGNOSIS — J44.9: ICD-10-CM

## 2018-09-27 DIAGNOSIS — Z88.8: ICD-10-CM

## 2018-09-27 DIAGNOSIS — R41.82: ICD-10-CM

## 2018-09-27 DIAGNOSIS — I25.10: ICD-10-CM

## 2018-09-27 DIAGNOSIS — E86.0: ICD-10-CM

## 2018-09-27 DIAGNOSIS — N39.0: ICD-10-CM

## 2018-09-27 DIAGNOSIS — E87.1: ICD-10-CM

## 2018-09-27 DIAGNOSIS — S01.112A: ICD-10-CM

## 2018-09-27 DIAGNOSIS — Z91.038: ICD-10-CM

## 2018-09-27 DIAGNOSIS — E10.9: ICD-10-CM

## 2018-09-27 DIAGNOSIS — I10: ICD-10-CM

## 2018-09-27 DIAGNOSIS — R56.9: Primary | ICD-10-CM

## 2018-09-27 DIAGNOSIS — Z91.030: ICD-10-CM
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
## 2018-09-27 LAB
ADD MANUAL DIFF: NO
ANION GAP SERPL CALC-SCNC: 13 MMOL/L (ref 5–19)
APPEARANCE UR: (no result)
APTT PPP: (no result) S
BASOPHILS # BLD AUTO: 0.1 10^3/UL (ref 0–0.2)
BASOPHILS NFR BLD AUTO: 0.5 % (ref 0–2)
BILIRUB UR QL STRIP: NEGATIVE
BUN SERPL-MCNC: 18 MG/DL (ref 7–20)
CALCIUM: 9.8 MG/DL (ref 8.4–10.2)
CHLORIDE SERPL-SCNC: 94 MMOL/L (ref 98–107)
CO2 SERPL-SCNC: 23 MMOL/L (ref 22–30)
EOSINOPHIL # BLD AUTO: 0.5 10^3/UL (ref 0–0.6)
EOSINOPHIL NFR BLD AUTO: 4.9 % (ref 0–6)
ERYTHROCYTE [DISTWIDTH] IN BLOOD BY AUTOMATED COUNT: 16.6 % (ref 11.5–14)
GLUCOSE SERPL-MCNC: 238 MG/DL (ref 75–110)
GLUCOSE UR STRIP-MCNC: NEGATIVE MG/DL
HCT VFR BLD CALC: 36.3 % (ref 37.9–51)
HGB BLD-MCNC: 12.4 G/DL (ref 13.5–17)
KETONES UR STRIP-MCNC: NEGATIVE MG/DL
LYMPHOCYTES # BLD AUTO: 0.9 10^3/UL (ref 0.5–4.7)
LYMPHOCYTES NFR BLD AUTO: 9.1 % (ref 13–45)
MCH RBC QN AUTO: 28.2 PG (ref 27–33.4)
MCHC RBC AUTO-ENTMCNC: 34 G/DL (ref 32–36)
MCV RBC AUTO: 83 FL (ref 80–97)
MONOCYTES # BLD AUTO: 0.5 10^3/UL (ref 0.1–1.4)
MONOCYTES NFR BLD AUTO: 4.8 % (ref 3–13)
NEUTROPHILS # BLD AUTO: 8.3 10^3/UL (ref 1.7–8.2)
NEUTS SEG NFR BLD AUTO: 80.7 % (ref 42–78)
NITRITE UR QL STRIP: NEGATIVE
PH UR STRIP: 6 [PH] (ref 5–9)
PLATELET # BLD: 288 10^3/UL (ref 150–450)
POTASSIUM SERPL-SCNC: 5.1 MMOL/L (ref 3.6–5)
PROT UR STRIP-MCNC: 100 MG/DL
RBC # BLD AUTO: 4.38 10^6/UL (ref 4.35–5.55)
SODIUM SERPL-SCNC: 129.8 MMOL/L (ref 137–145)
SP GR UR STRIP: 1.01
TOTAL CELLS COUNTED % (AUTO): 100 %
UROBILINOGEN UR-MCNC: NEGATIVE MG/DL (ref ?–2)
WBC # BLD AUTO: 10.3 10^3/UL (ref 4–10.5)

## 2018-09-27 PROCEDURE — 36415 COLL VENOUS BLD VENIPUNCTURE: CPT

## 2018-09-27 PROCEDURE — 71250 CT THORAX DX C-: CPT

## 2018-09-27 PROCEDURE — 87186 SC STD MICRODIL/AGAR DIL: CPT

## 2018-09-27 PROCEDURE — 93005 ELECTROCARDIOGRAM TRACING: CPT

## 2018-09-27 PROCEDURE — 96360 HYDRATION IV INFUSION INIT: CPT

## 2018-09-27 PROCEDURE — 80048 BASIC METABOLIC PNL TOTAL CA: CPT

## 2018-09-27 PROCEDURE — 93010 ELECTROCARDIOGRAM REPORT: CPT

## 2018-09-27 PROCEDURE — L0120 CERV FLEX N/ADJ FOAM PRE OTS: HCPCS

## 2018-09-27 PROCEDURE — 87088 URINE BACTERIA CULTURE: CPT

## 2018-09-27 PROCEDURE — 85025 COMPLETE CBC W/AUTO DIFF WBC: CPT

## 2018-09-27 PROCEDURE — 70450 CT HEAD/BRAIN W/O DYE: CPT

## 2018-09-27 PROCEDURE — 51701 INSERT BLADDER CATHETER: CPT

## 2018-09-27 PROCEDURE — 81001 URINALYSIS AUTO W/SCOPE: CPT

## 2018-09-27 PROCEDURE — 99285 EMERGENCY DEPT VISIT HI MDM: CPT

## 2018-09-27 PROCEDURE — 87086 URINE CULTURE/COLONY COUNT: CPT

## 2018-09-27 NOTE — ER DOCUMENT REPORT
ED Seizure





- General


Chief Complaint: Probable Seizure


Stated Complaint: POSSIBLE SEIZURE


Time Seen by Provider: 09/27/18 12:07





- HPI


Notes: 





Patient is a 48-year-old male that presents to the emergency department for 

chief complaint of seizure.


Patient presents to the emergency room for complaint of possible seizure.  He 

was in the bathroom and family heard him fall.  When they went in he was lying 

next to the toilet not responding to them.  They called EMS.  EMS states when 

they arrived he had snoring respirations but quickly became more alert.  

Without treatment he had return to his baseline mental status and became 

verbally aggressive with EMS.  Per family he has an extensive psych history and 

has been having escalating behavioral issues.  They state he was at his 

baseline when EMS was loading him into the ambulance.  It is unknown if he has 

been taking his medications as prescribed.  Patient denies any symptoms to me 

however EMS said that he did answer yes when they asked if he had any dysuria.  

Patient denies homicidal and suicidal ideation.  EMS states it has been "a while

" since his last seizure.  HPI is limited because of patient's current mental 

status





Past Medical History: Seizure disorder





Past Surgical History: Unknown





Social History: Unknown





Family History: Reviewed and noncontributory for presenting illness





Allergies: Reviewed, see documented allergy list.








REVIEW OF SYSTEMS:





CONSTITUTIONAL : 





No fever





No chills





No diaphoresis





No recent illness





EENT:





No vision changes





No congestion





No sore throat  





CARDIOVASCULAR:





No chest pain





No palpitations





RESPIRATORY:





No shortness of breath





No cough





No difficulty breathing





GASTROINTESTINAL: 





No abdominal pain





No nausea





No vomiting





No diarrhea





GENITOURINARY:





 dysuria





No hematuria





No difficulty urinating





MUSCULOSKELETAL:





No back pain





No leg pain





No arm pain





SKIN:  





No rashes





No lesions





LYMPHATIC: 





No swollen, enlarged glands.





NEUROLOGICAL: 





No lightheadedness





No headache





No weakness





No paresthesias





PSYCHIATRIC:





No anxiety





No depression 








PHYSICAL EXAMINATION:





Vital signs reviewed, nursing noted reviewed.





GENERAL: Somnolent but wakes to verbal stimuli, nontoxic





HEAD:  normocephalic.





EYES: Eyes appear normal, extraocular movements intact, sclera anicteric, 

conjunctiva are normal.





ENT: nares patent, oropharynx clear without exudates.  Dry mucous membranes





NECK: Normal range of motion, supple without lymphadenopathy





LUNGS: Breath sounds clear to auscultation bilaterally and equal.  No wheezes 

rales or rhonchi.





HEART: Regular rate and rhythm without murmurs





ABDOMEN: Soft, nontender, normoactive bowel sounds.  No rebound, guarding, or 

rigidity. No masses appreciated.





EXTREMITIES: Nontender, good range of motion, no pitting or edema. 





NEUROLOGICAL: No focal neurological deficits. Moves all extremities 

spontaneously Motor and sensory grossly intact on exam. oriented to person and 

place, not time





PSYCH: Not homicidal or suicidal, withdrawn affect





SKIN: Warm, Dry, normal turgor, 0.5 cm left eyebrow partial-thickness 

laceration with no active bleeding, no cephalhematoma











- Related Data


Allergies/Adverse Reactions: 


 





bee pollen [Bee Pollen] Allergy (Verified 08/15/18 08:55)


 


ketorolac [From Toradol] Allergy (Verified 08/15/18 08:55)


 


venom-wasp Allergy (Verified 08/15/18 08:55)


 











Past Medical History





- Social History


Smoking Status: Never Smoker


Family History: Reviewed & Not Pertinent, COPD, DM





- Past Medical History


Cardiac Medical History: Reports: Hx Congestive Heart Failure, Hx Coronary 

Artery Disease, Hx Heart Attack, Hx Hypertension


Pulmonary Medical History: Reports: Hx Bronchitis, Hx COPD, Hx Pneumonia


Neurological Medical History: Reports: Hx Cerebrovascular Accident, Hx Migraine

, Hx Seizures


Endocrine Medical History: Reports: Hx Diabetes Mellitus Type 1


Renal/ Medical History: Reports: Hx Benign Prostatic Hyperplasia, Hx Kidney 

Stones.  Denies: Hx Peritoneal Dialysis


GI Medical History: Reports: Hx Gastroesophageal Reflux Disease, Hx Ulcer


Musculoskeletal Medical History: Reports Hx Arthritis


Psychiatric Medical History: Reports: Hx Anxiety - YES,CONTROLLED, Hx Attention 

Deficit Hyperactivity Disorder, Hx Depression, Hx Personality Disorder


Past Surgical History: Reports: Hx Open Heart Surgery - correction of heart 

murmur, Hx Orthopedic Surgery - partial left hip replacement, Hx Tonsillectomy, 

Other - Ventral septal defect repair as infant





- Immunizations


Hx Diphtheria, Pertussis, Tetanus Vaccination: No - unk


Hx Pneumococcal Vaccination: 01/01/15





Review of Systems





- Review of Systems


Notes: 





Dictated





Physical Exam





- Vital signs


Vitals: 


 











Temp Resp BP Pulse Ox


 


 98.1 F   16   138/91 H  96 


 


 09/27/18 12:00  09/27/18 12:00  09/27/18 12:00  09/27/18 12:00














- Notes


Notes: 





Dictated





Course





- Re-evaluation


Re-evalutation: 





09/27/18 12:20


Vitals reviewed and stable.  Patient was postictal per EMS but then woke up.  

On my exam he appears somnolent and postictal.  Seizure precautions were 

started.  EKG shows sinus rhythm with no ischemia or dysrhythmia


09/27/18 14:55





Laboratory











  09/27/18 09/27/18 09/27/18





  12:30 12:30 14:15


 


WBC  10.3  


 


RBC  4.38  


 


Hgb  12.4 L  


 


Hct  36.3 L  


 


MCV  83  


 


MCH  28.2  


 


MCHC  34.0  


 


RDW  16.6 H  


 


Plt Count  288  


 


Seg Neutrophils %  80.7 H  


 


Lymphocytes %  9.1 L  


 


Monocytes %  4.8  


 


Eosinophils %  4.9  


 


Basophils %  0.5  


 


Absolute Neutrophils  8.3 H  


 


Absolute Lymphocytes  0.9  


 


Absolute Monocytes  0.5  


 


Absolute Eosinophils  0.5  


 


Absolute Basophils  0.1  


 


Sodium   129.8 L 


 


Potassium   5.1 H 


 


Chloride   94 L 


 


Carbon Dioxide   23 


 


Anion Gap   13 


 


BUN   18 


 


Creatinine   1.08 


 


Est GFR ( Amer)   > 60 


 


Est GFR (Non-Af Amer)   > 60 


 


Glucose   238 H 


 


Calcium   9.8 


 


Urine Color    RACHEL


 


Urine Appearance    TURBID


 


Urine pH    6.0


 


Ur Specific Gravity    1.012


 


Urine Protein    100 H


 


Urine Glucose (UA)    NEGATIVE


 


Urine Ketones    NEGATIVE


 


Urine Blood    MODERATE H


 


Urine Nitrite    NEGATIVE


 


Urine Bilirubin    NEGATIVE


 


Urine Urobilinogen    NEGATIVE


 


Ur Leukocyte Esterase    LARGE H


 


Urine WBC (Auto)    >182


 


Urine RBC (Auto)    97


 


Urine Bacteria (Auto)    2+


 


Urine WBC Clumps    OCC


 


Urine Ascorbic Acid    NEGATIVE














 





Chest CT  09/27/18 12:08


IMPRESSION:  No acute findings in the thorax.  There appears to be chronic 

thickening of the wall of the distal esophagus.  Osseous findings as described.


 








Head CT  09/27/18 12:08


IMPRESSION:  No significant interval change as compared to the previous study.  

The previously described dilatation of the ventricular system appears stable.  

Other findings as noted above


EVIDENCE OF ACUTE STROKE: NO.


 








Patient's lab work is consistent with dehydration.  He has mild hyponatremia 

and hyperkalemia.  There are no EKG changes with his hyperkalemia.  The 

hyponatremia and hyperkalemia was treated with IV hydration.  CT brain is 

negative for injury.  On reevaluation he is awake, alert, and oriented x3, 

patient is Nexus criteria negative and not complaining of any cervical 

symptoms.  CT cervical spine not currently indicated.  CT of the chest shows no 

acute injury.  Urinalysis is consistent with UTI.  Patient does straight 

catheterizations at home and will be treated for UTI with 10 days of Bactrim.  

He does not have sepsis.  He has been monitored in the emergency room with 

complete resolution of postictal state and no further seizure activity.  He 

would like to be discharged home.  He is stable at time of discharge.  He will 

return for new or worsening symptoms.  He will otherwise follow with his 

primary care doctor in a few days for reevaluation.





- Vital Signs


Vital signs: 


 











Temp Pulse Resp BP Pulse Ox


 


 98.1 F      18   164/97 H  100 


 


 09/27/18 12:00     09/27/18 14:01  09/27/18 14:01  09/27/18 14:01














- Laboratory


Result Diagrams: 


 09/27/18 12:30





 09/27/18 12:30


Laboratory results interpreted by me: 


 











  09/27/18 09/27/18 09/27/18





  12:30 12:30 14:15


 


Hgb  12.4 L  


 


Hct  36.3 L  


 


RDW  16.6 H  


 


Seg Neutrophils %  80.7 H  


 


Lymphocytes %  9.1 L  


 


Absolute Neutrophils  8.3 H  


 


Sodium   129.8 L 


 


Potassium   5.1 H 


 


Chloride   94 L 


 


Glucose   238 H 


 


Urine Protein    100 H


 


Urine Blood    MODERATE H


 


Ur Leukocyte Esterase    LARGE H














- EKG Interpretation by Me


Additional EKG results interpreted by me: 





09/27/18 12:21


1203: Normal sinus rhythm, rate 95, normal axis, no ectopy, no change from 7/9/ 2018





Discharge





- Discharge


Clinical Impression: 


 Seizure, Hyponatremia, Dehydration, Hyperkalemia





Change in mental status


Qualifiers:


 Altered mental status type: unspecified Qualified Code(s): R41.82 - Altered 

mental status, unspecified





Head injury


Qualifiers:


 Encounter type: initial encounter Qualified Code(s): S09.90XA - Unspecified 

injury of head, initial encounter





Eyebrow laceration


Qualifiers:


 Encounter type: initial encounter Laterality: left Qualified Code(s): S01.112A 

- Laceration without foreign body of left eyelid and periocular area, initial 

encounter





Condition: Stable


Disposition: HOME, SELF-CARE


Instructions:  Dehydration ([facility]), Head Injury Precautions ([facility]), Hyponatremia (

[facility]), Urinary Tract Infection ([facility])


Additional Instructions: 


Please return to the emergency department if you have any worsening, or concern 

of your symptoms.





Please return to the emergency department if you develop chest pain, difficulty 

breathing, severe abdominal pain, or ongoing vomiting.





Please follow-up with your primary care physician in 2-3 days and any other 

recommended physicians.





If prescribed, take all medications as directed. 





If you have any questions or concerns do not hesitate to return the emergency 

department for evaluation.





[]





Prescriptions: 


Sulfamethoxazole/Trimethoprim [Bactrim Ds Tablet] 1 each PO BID #20 tablet


Referrals: 


CRISTIAN BRINK MD [Primary Care Provider] - Follow up in 3-5 days

## 2018-09-27 NOTE — RADIOLOGY REPORT (SQ)
EXAM DESCRIPTION:  CT HEAD WITHOUT



COMPLETED DATE/TIME:  9/27/2018 12:57 pm



REASON FOR STUDY:  trauma



COMPARISON:  July 2018



TECHNIQUE:  Axial images acquired through the brain without intravenous contrast.  Images reviewed wi
th bone, brain and subdural windows.  Additional sagittal and coronal reconstructions were generated.
 Images stored on PACS.

All CT scanners at this facility use dose modulation, iterative reconstruction, and/or weight based d
osing when appropriate to reduce radiation dose to as low as reasonably achievable (ALARA).

[facility]: Dose Right  [facility]: CareDose    [facility]: Dose Right    [facility]: Teradose 4D    [facility]: Smart Technologies



RADIATION DOSE:  CT Rad equipment meets quality standard of care and radiation dose reduction techniq
ues were employed. CTDIvol: 53.2 mGy. DLP: 1070 mGy-cm. mGy.



LIMITATIONS:  None.



FINDINGS:  VENTRICLES: The previously described dilatation of the ventricular system is again identif
ied and appears unchanged.

CEREBRUM: No masses.  No hemorrhage.  No midline shift.  No evidence for acute infarction. Normal gra
y/white matter differentiation. No areas of low density in the white matter.

CEREBELLUM: No masses.  No hemorrhage.  No alteration of density.  No evidence for acute infarction.

EXTRAAXIAL SPACES: No fluid collections.  No masses.

ORBITS AND GLOBE: No intra- or extraconal masses.  Normal contour of globe without masses.

CALVARIUM: No fracture.

PARANASAL SINUSES: No fluid or mucosal thickening.

SOFT TISSUES: No mass or hematoma.

OTHER: No other significant finding.



IMPRESSION:  No significant interval change as compared to the previous study.  The previously descri
bed dilatation of the ventricular system appears stable.  Other findings as noted above

EVIDENCE OF ACUTE STROKE: NO.



COMMENT:  Quality ID # 436: Final reports with documentation of one or more dose reduction techniques
 (e.g., Automated exposure control, adjustment of the mA and/or kV according to patient size, use of 
iterative reconstruction technique)



TECHNICAL DOCUMENTATION:  JOB ID:  [id number]

 2011 [employer]- All Rights Reserved



Reading location - IP/workstation name: [email or web address]

## 2018-09-27 NOTE — RADIOLOGY REPORT (SQ)
EXAM DESCRIPTION:  CT CHEST WITHOUT



COMPLETED DATE/TIME:  9/27/2018 12:57 pm



REASON FOR STUDY:  trauma



COMPARISON:  7/9/2018



TECHNIQUE:  CT scan performed of the chest without intravenous contrast.  Images reviewed with lung, 
soft tissue and bone windows.  Reconstructed coronal and sagittal MPR images reviewed.  All images st
ored on PACS.

All CT scanners at this facility use dose modulation, iterative reconstruction, and/or weight based d
osing when appropriate to reduce radiation dose to as low as reasonably achievable (ALARA).

[facility]: Dose Right  [facility]: CareDose    [facility]: Dose Right    [facility]: Teradose 4D    [facility]: [employer]



RADIATION DOSE:  CT Rad equipment meets quality standard of care and radiation dose reduction techniq
ues were employed. CTDIvol: 14.4 mGy. DLP: 561 mGy-cm. mGy.



LIMITATIONS:  No technical limitations.



FINDINGS:  LUNGS AND PLEURA: No masses, infiltrates, or pneumothorax.  No pleural effusions or pleura
l calcifications.

HILAR AND MEDIASTINAL STRUCTURES: There continues to be the appearance of thickening of the wall of t
he distal esophagus.

HEART AND VASCULAR STRUCTURES: No aneurysm.  No pericardial effusion.

UPPER ABDOMEN: No significant findings.  Limited exam.

THYROID AND OTHER SOFT TISSUES: No masses.  No adenopathy.

BONES: Old rib deformities on the left.  Old lower thoracic compression changes.

HARDWARE: None in the chest.

OTHER: No other significant findings.



IMPRESSION:  No acute findings in the thorax.  There appears to be chronic thickening of the wall of 
the distal esophagus.  Osseous findings as described.



TECHNICAL DOCUMENTATION:  JOB ID:  [id number]

Quality ID # 436: Final reports with documentation of one or more dose reduction techniques (e.g., Au
tomated exposure control, adjustment of the mA and/or kV according to patient size, use of iterative 
reconstruction technique)

 2011 [employer]- All Rights Reserved



Reading location - IP/workstation name: [email or web address]

## 2018-11-15 ENCOUNTER — HOSPITAL ENCOUNTER (INPATIENT)
Dept: HOSPITAL 62 - ER | Age: 48
LOS: 32 days | Discharge: ANOTHER HOSPITAL | DRG: 493 | End: 2018-12-17
Attending: INTERNAL MEDICINE | Admitting: INTERNAL MEDICINE
Payer: MEDICAID

## 2018-11-15 DIAGNOSIS — I50.9: ICD-10-CM

## 2018-11-15 DIAGNOSIS — N30.00: ICD-10-CM

## 2018-11-15 DIAGNOSIS — D50.8: ICD-10-CM

## 2018-11-15 DIAGNOSIS — I25.2: ICD-10-CM

## 2018-11-15 DIAGNOSIS — Z82.49: ICD-10-CM
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
## 2018-11-15 DIAGNOSIS — Q05.9: ICD-10-CM

## 2018-11-15 DIAGNOSIS — J44.9: ICD-10-CM

## 2018-11-15 DIAGNOSIS — N18.2: ICD-10-CM

## 2018-11-15 DIAGNOSIS — B96.1: ICD-10-CM

## 2018-11-15 DIAGNOSIS — T40.605A: ICD-10-CM

## 2018-11-15 DIAGNOSIS — F17.210: ICD-10-CM

## 2018-11-15 DIAGNOSIS — S82.831A: ICD-10-CM

## 2018-11-15 DIAGNOSIS — F90.9: ICD-10-CM

## 2018-11-15 DIAGNOSIS — Z91.030: ICD-10-CM

## 2018-11-15 DIAGNOSIS — Z79.4: ICD-10-CM

## 2018-11-15 DIAGNOSIS — I13.0: ICD-10-CM

## 2018-11-15 DIAGNOSIS — N31.2: ICD-10-CM

## 2018-11-15 DIAGNOSIS — Z95.1: ICD-10-CM

## 2018-11-15 DIAGNOSIS — Z88.8: ICD-10-CM

## 2018-11-15 DIAGNOSIS — K59.03: ICD-10-CM

## 2018-11-15 DIAGNOSIS — E03.9: ICD-10-CM

## 2018-11-15 DIAGNOSIS — Z16.19: ICD-10-CM

## 2018-11-15 DIAGNOSIS — B37.2: ICD-10-CM

## 2018-11-15 DIAGNOSIS — Z23: ICD-10-CM

## 2018-11-15 DIAGNOSIS — Z83.3: ICD-10-CM

## 2018-11-15 DIAGNOSIS — S82.391A: Primary | ICD-10-CM

## 2018-11-15 DIAGNOSIS — M19.90: ICD-10-CM

## 2018-11-15 DIAGNOSIS — I25.10: ICD-10-CM

## 2018-11-15 DIAGNOSIS — F31.9: ICD-10-CM

## 2018-11-15 DIAGNOSIS — E10.65: ICD-10-CM

## 2018-11-15 DIAGNOSIS — D63.1: ICD-10-CM

## 2018-11-15 DIAGNOSIS — Z79.899: ICD-10-CM

## 2018-11-15 DIAGNOSIS — Z83.6: ICD-10-CM

## 2018-11-15 DIAGNOSIS — Z79.82: ICD-10-CM
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
## 2018-11-15 DIAGNOSIS — E22.2: ICD-10-CM

## 2018-11-15 DIAGNOSIS — K21.9: ICD-10-CM

## 2018-11-15 DIAGNOSIS — N40.1: ICD-10-CM

## 2018-11-15 DIAGNOSIS — Z96.642: ICD-10-CM
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
## 2018-11-15 DIAGNOSIS — E78.00: ICD-10-CM

## 2018-11-15 LAB
ADD MANUAL DIFF: NO
ANION GAP SERPL CALC-SCNC: 17 MMOL/L (ref 5–19)
APPEARANCE UR: (no result)
APTT BLD: 22.5 SEC (ref 23.5–35.8)
APTT PPP: YELLOW S
BASOPHILS # BLD AUTO: 0.1 10^3/UL (ref 0–0.2)
BASOPHILS NFR BLD AUTO: 0.7 % (ref 0–2)
BILIRUB UR QL STRIP: NEGATIVE
BUN SERPL-MCNC: 12 MG/DL (ref 7–20)
CALCIUM: 9.6 MG/DL (ref 8.4–10.2)
CHLORIDE SERPL-SCNC: 90 MMOL/L (ref 98–107)
CO2 SERPL-SCNC: 24 MMOL/L (ref 22–30)
EOSINOPHIL # BLD AUTO: 0.2 10^3/UL (ref 0–0.6)
EOSINOPHIL NFR BLD AUTO: 1 % (ref 0–6)
ERYTHROCYTE [DISTWIDTH] IN BLOOD BY AUTOMATED COUNT: 15.9 % (ref 11.5–14)
GLUCOSE SERPL-MCNC: 253 MG/DL (ref 75–110)
GLUCOSE UR STRIP-MCNC: NEGATIVE MG/DL
HCT VFR BLD CALC: 37.5 % (ref 37.9–51)
HGB BLD-MCNC: 12.5 G/DL (ref 13.5–17)
INR PPP: 0.94
KETONES UR STRIP-MCNC: NEGATIVE MG/DL
LYMPHOCYTES # BLD AUTO: 1.1 10^3/UL (ref 0.5–4.7)
LYMPHOCYTES NFR BLD AUTO: 7.2 % (ref 13–45)
MCH RBC QN AUTO: 28 PG (ref 27–33.4)
MCHC RBC AUTO-ENTMCNC: 33.3 G/DL (ref 32–36)
MCV RBC AUTO: 84 FL (ref 80–97)
MONOCYTES # BLD AUTO: 0.7 10^3/UL (ref 0.1–1.4)
MONOCYTES NFR BLD AUTO: 4.9 % (ref 3–13)
NEUTROPHILS # BLD AUTO: 13 10^3/UL (ref 1.7–8.2)
NEUTS SEG NFR BLD AUTO: 86.2 % (ref 42–78)
NITRITE UR QL STRIP: NEGATIVE
PH UR STRIP: 6 [PH] (ref 5–9)
PLATELET # BLD: 232 10^3/UL (ref 150–450)
POTASSIUM SERPL-SCNC: 4.7 MMOL/L (ref 3.6–5)
PROT UR STRIP-MCNC: 100 MG/DL
PROTHROMBIN TIME: 13.1 SEC (ref 11.4–15.4)
RBC # BLD AUTO: 4.45 10^6/UL (ref 4.35–5.55)
SODIUM SERPL-SCNC: 130.7 MMOL/L (ref 137–145)
SP GR UR STRIP: 1.01
TOTAL CELLS COUNTED % (AUTO): 100 %
UROBILINOGEN UR-MCNC: NEGATIVE MG/DL (ref ?–2)
WBC # BLD AUTO: 15.1 10^3/UL (ref 4–10.5)

## 2018-11-15 PROCEDURE — 80053 COMPREHEN METABOLIC PANEL: CPT

## 2018-11-15 PROCEDURE — 36415 COLL VENOUS BLD VENIPUNCTURE: CPT

## 2018-11-15 PROCEDURE — 84443 ASSAY THYROID STIM HORMONE: CPT

## 2018-11-15 PROCEDURE — 90471 IMMUNIZATION ADMIN: CPT

## 2018-11-15 PROCEDURE — C1758 CATHETER, URETERAL: HCPCS

## 2018-11-15 PROCEDURE — 85730 THROMBOPLASTIN TIME PARTIAL: CPT

## 2018-11-15 PROCEDURE — 85025 COMPLETE CBC W/AUTO DIFF WBC: CPT

## 2018-11-15 PROCEDURE — 93306 TTE W/DOPPLER COMPLETE: CPT

## 2018-11-15 PROCEDURE — C1713 ANCHOR/SCREW BN/BN,TIS/BN: HCPCS

## 2018-11-15 PROCEDURE — 87186 SC STD MICRODIL/AGAR DIL: CPT

## 2018-11-15 PROCEDURE — 80048 BASIC METABOLIC PNL TOTAL CA: CPT

## 2018-11-15 PROCEDURE — 82962 GLUCOSE BLOOD TEST: CPT

## 2018-11-15 PROCEDURE — 70450 CT HEAD/BRAIN W/O DYE: CPT

## 2018-11-15 PROCEDURE — 3E0F73Z INTRODUCTION OF ANTI-INFLAMMATORY INTO RESPIRATORY TRACT, VIA NATURAL OR ARTIFICIAL OPENING: ICD-10-PCS | Performed by: INTERNAL MEDICINE

## 2018-11-15 PROCEDURE — 85610 PROTHROMBIN TIME: CPT

## 2018-11-15 PROCEDURE — 90686 IIV4 VACC NO PRSV 0.5 ML IM: CPT

## 2018-11-15 PROCEDURE — 0QHG36Z INSERTION OF INTRAMEDULLARY INTERNAL FIXATION DEVICE INTO RIGHT TIBIA, PERCUTANEOUS APPROACH: ICD-10-PCS | Performed by: ORTHOPAEDIC SURGERY

## 2018-11-15 PROCEDURE — 71045 X-RAY EXAM CHEST 1 VIEW: CPT

## 2018-11-15 PROCEDURE — 81001 URINALYSIS AUTO W/SCOPE: CPT

## 2018-11-15 PROCEDURE — 96374 THER/PROPH/DIAG INJ IV PUSH: CPT

## 2018-11-15 PROCEDURE — 83930 ASSAY OF BLOOD OSMOLALITY: CPT

## 2018-11-15 PROCEDURE — 84300 ASSAY OF URINE SODIUM: CPT

## 2018-11-15 PROCEDURE — G0008 ADMIN INFLUENZA VIRUS VAC: HCPCS

## 2018-11-15 PROCEDURE — 87088 URINE BACTERIA CULTURE: CPT

## 2018-11-15 PROCEDURE — 83735 ASSAY OF MAGNESIUM: CPT

## 2018-11-15 PROCEDURE — 84439 ASSAY OF FREE THYROXINE: CPT

## 2018-11-15 PROCEDURE — 72170 X-RAY EXAM OF PELVIS: CPT

## 2018-11-15 PROCEDURE — 99285 EMERGENCY DEPT VISIT HI MDM: CPT

## 2018-11-15 PROCEDURE — C1769 GUIDE WIRE: HCPCS

## 2018-11-15 PROCEDURE — 01392 ANES OPN PX UPR TIB FIB&/PAT: CPT

## 2018-11-15 PROCEDURE — 83036 HEMOGLOBIN GLYCOSYLATED A1C: CPT

## 2018-11-15 PROCEDURE — 93005 ELECTROCARDIOGRAM TRACING: CPT

## 2018-11-15 PROCEDURE — 85027 COMPLETE CBC AUTOMATED: CPT

## 2018-11-15 PROCEDURE — 87086 URINE CULTURE/COLONY COUNT: CPT

## 2018-11-15 PROCEDURE — 93010 ELECTROCARDIOGRAM REPORT: CPT

## 2018-11-15 PROCEDURE — 83935 ASSAY OF URINE OSMOLALITY: CPT

## 2018-11-15 RX ADMIN — Medication SCH ML: at 22:32

## 2018-11-15 RX ADMIN — SIMVASTATIN SCH MG: 40 TABLET, FILM COATED ORAL at 21:58

## 2018-11-15 RX ADMIN — BUSPIRONE HYDROCHLORIDE SCH MG: 10 TABLET ORAL at 21:58

## 2018-11-15 RX ADMIN — METOPROLOL SUCCINATE SCH MG: 25 TABLET, EXTENDED RELEASE ORAL at 14:20

## 2018-11-15 RX ADMIN — LISINOPRIL SCH MG: 5 TABLET ORAL at 14:20

## 2018-11-15 RX ADMIN — OLANZAPINE SCH MG: 5 TABLET, FILM COATED ORAL at 21:56

## 2018-11-15 RX ADMIN — BUSPIRONE HYDROCHLORIDE SCH: 10 TABLET ORAL at 14:34

## 2018-11-15 RX ADMIN — Medication SCH: at 15:29

## 2018-11-15 RX ADMIN — POTASSIUM CHLORIDE SCH: 750 TABLET, FILM COATED, EXTENDED RELEASE ORAL at 14:13

## 2018-11-15 RX ADMIN — FLUOXETINE SCH MG: 20 CAPSULE ORAL at 21:57

## 2018-11-15 RX ADMIN — OXCARBAZEPINE SCH MG: 150 TABLET, FILM COATED ORAL at 22:30

## 2018-11-15 RX ADMIN — BENZTROPINE MESYLATE SCH: 1 TABLET ORAL at 14:13

## 2018-11-15 RX ADMIN — INSULIN LISPRO PRN UNIT: 100 INJECTION, SOLUTION INTRAVENOUS; SUBCUTANEOUS at 22:43

## 2018-11-15 RX ADMIN — IPRATROPIUM BROMIDE AND ALBUTEROL SULFATE PRN ML: 2.5; .5 SOLUTION RESPIRATORY (INHALATION) at 14:54

## 2018-11-15 RX ADMIN — FLUOXETINE SCH MG: 20 CAPSULE ORAL at 14:25

## 2018-11-15 NOTE — RADIOLOGY REPORT (SQ)
EXAM DESCRIPTION:  NO CHG FLUORO; TIBIA FIBULA RIGHT



COMPLETED DATE/TIME:  11/15/2018 5:16 pm



REASON FOR STUDY:  IM NAILING RT TIB/FIB



COMPARISON:  None.



FLUOROSCOPY TIME:  1.2 minutes

4 Images saved to PACS



LIMITATIONS:  None.



PROCEDURE:  ORIF tibial fracture.



FINDINGS:  Images from fluoro document the placed along medullary davi in the tibia secured by screws 
at each hand.



IMPRESSION:  ORIF tibial fracture.  Refer to operative note for further information.



COMMENT:  PQRS 6045F:  Fluoroscopy time of the procedure is documented in the report.



TECHNICAL DOCUMENTATION:  JOB ID:  [id number]

 2011 [employer]- All Rights Reserved



Reading location - IP/workstation name: [email or web address]

## 2018-11-15 NOTE — RADIOLOGY REPORT (SQ)
EXAM DESCRIPTION:  CHEST SINGLE VIEW



COMPLETED DATE/TIME:  11/15/2018 9:39 am



REASON FOR STUDY:  pre op



COMPARISON:  AP chest 9/24/2016, 1/3/2017, 7/10/2018

CT chest 9/27/2018



EXAM PARAMETERS:  NUMBER OF VIEWS: One view.

TECHNIQUE: Single frontal radiographic view of the chest acquired.

RADIATION DOSE: NA

LIMITATIONS: None.



FINDINGS:  LUNGS AND PLEURA: No opacities, masses or pneumothorax. No pleural effusion.

MEDIASTINUM AND HILAR STRUCTURES: No masses.  Contour normal.

HEART AND VASCULAR STRUCTURES: Heart normal in size.  Normal vasculature.

BONES: Old left rib fractures or thoracotomy defect unchanged.

HARDWARE: None in the chest.

OTHER: No other significant finding.



IMPRESSION:  No acute findings



TECHNICAL DOCUMENTATION:  JOB ID:  [id number]

 2011 [employer]- All Rights Reserved



Reading location - IP/workstation name: [email or web address]

## 2018-11-15 NOTE — RADIOLOGY REPORT (SQ)
EXAM DESCRIPTION: 



XR PELVIS 1-2 VIEWS



COMPLETED DATE/TME:  11/15/2018 00:00



CLINICAL HISTORY: 



48 years, Male, FALL, DEFORMITY



COMPARISON:

CT 8/15/2018.



NUMBER OF VIEWS:

1



TECHNIQUE:

AP pelvis



LIMITATIONS:

None.



FINDINGS:



Osteopenia. Status post total right hip arthroplasty.

Postsurgical changes with intramedullary davi and gamma nails of

the left hip as well. Old healed fracture deformities with

heterotopic bone formation of the proximal [place]. No

radiographic evidence for acute fracture or dislocation.

Correlate with any history of inability to ambulate.



IMPRESSION:



Postsurgical changes with heterotopic bone formation of the

proximal femurs bilaterally. No radiographic evidence for acute

fracture. Correlate with any history of inability to ambulate.

 



 2011 [employer]- All Rights Reserved

## 2018-11-15 NOTE — XCELERA REPORT
[address] [address]

                               Tel: [phone]

                               Fax: [phone]



                      Transthoracic Echocardiogram Report

_______________________________________________________________________________



Name: TARA SCOTT

MRN: [id number]                           Age: 48 yrs

Gender: Male                              : 1970

Patient Status: Inpatient                 Patient Location: [address] [place] [address]

Account #: [id number]

Study Date: 11/15/2018 01:36 PM

Accession #: [id number]

_______________________________________________________________________________



Height: 63 in        Weight: 141 lb        BSA: 1.7 m2

_______________________________________________________________________________

Procedure: A complete two-dimensional transthoracic echocardiogram was

performed (2D, M-mode, spectral and color flow Doppler). The study was

technically difficult with many images being suboptimal in quality.

Reason For Study: Hx CAD. Pre-op eval





Ordering Physician: NICOLLE HANSON

Performed By: Anita Mott



_______________________________________________________________________________



Interpretation Summary

LV EF is 45%

Left ventricular systolic function is mildly reduced.

The left ventricle is borderline dilated.

There is normal left ventricular wall thickness.

Doppler measurements suggest pseudonormalized left ventricular relaxation,

which is associated with grade II/IV or mild to moderate diastolic dysfunction

There is apical wall hypokinesis

The right ventricular systolic function is normal.

The left atrium is mildly dilated.

The right atrium is normal in size

There is no mitral stenosis

There is a mild amount of mitral regurgitation

No aortic regurgitation is present.

There is no aortic valve stenosis

There is a trace or physiologic amount of tricuspid regurgitation

Tricuspid regurgitation jet envelope not well defined to measure RV systolic

pressure accurately.

There is no pericardial effusion.



MMode/2D Measurements & Calculations

RVDd: 2.5 cm   LVIDd: 4.3 cm   FS: 38.2 %             Ao root diam: 2.4 cm

IVSd: 0.88 cm  LVIDs: 2.7 cm   EDV(Teich): 84.2 ml

                                                      Ao root area: 4.4 cm2

               LVPWd: 0.92 cm  ESV(Teich): 26.3 ml    LA dimension: 3.0 cm

                               EF(Teich): 68.7 %



Doppler Measurements & Calculations

MV E max corrina:       MV P1/2t max corrina:    Ao V2 max:          LV V1 max P.5 cm/sec         77.0 cm/sec          120.8 cm/sec        3.1 mmHg

MV A max corrina:       MV P1/2t: 58.5 msec  Ao max P.8 mmHg LV V1 max:

112.0 cm/sec        MVA(P1/2t): 3.8 cm2                      87.4 cm/sec

MV E/A: 0.68        MV dec slope:



                    385.5 cm/sec2

                    MV dec time: 0.18 sec

        _______________________________________________________________

PA V2 max:          TR max corrina:          MV P1/2t-pr_phl:

105.1 cm/sec        234.8 cm/sec         58.5 msec

PA max P.4 mmHg TR max P.1 mmHg





Left Ventricle

The left ventricle is borderline dilated. There is normal left ventricular

wall thickness. Left ventricular systolic function is mildly reduced. LV EF is

45%. Doppler measurements suggest pseudonormalized left ventricular

relaxation, which is associated with grade II/IV or mild to moderate diastolic

dysfunction. There is apical wall hypokinesis.



Right Ventricle

The right ventricle is grossly normal size. There is normal right ventricular

wall thickness. The right ventricular systolic function is normal.



Atria

The right atrium is normal in size. The left atrium is mildly dilated.

Interarterial septum not well visualized and not well dopplered. Cannot

comment on ASD/PFO presence.



Mitral Valve

The mitral valve is grossly normal. There is no mitral stenosis. There is a

mild amount of mitral regurgitation.



Aortic Valve

The aortic valve is grossly normal. There is no aortic valve stenosis. No

aortic regurgitation is present.





Tricuspid Valve

The tricuspid valve is not well visualized, but is grossly normal. There is no

tricuspid stenosis. There is a trace or physiologic amount of tricuspid

regurgitation. Tricuspid regurgitation jet envelope not well defined to

measure RV systolic pressure accurately.



Pulmonic Valve

The pulmonic valve is not well visualized.



Great Vessels

The aortic root is not well visualized but is probably normal size. The

inferior vena cava appeared normal and decreased > 50% with respiration (RAP

5-10 mmHg).



Effusions

There is no pericardial effusion.





_______________________________________________________________________________

_______________________________________________________________________________



Electronically signed by:      Vicky Frank      on 11/15/2018 03:52 PM



CC: NICOLLE HANSON

>

Vicky Frank
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
## 2018-11-15 NOTE — RADIOLOGY REPORT (SQ)
EXAM DESCRIPTION:  NO CHG FLUORO; TIBIA FIBULA RIGHT



COMPLETED DATE/TIME:  11/15/2018 5:16 pm



REASON FOR STUDY:  IM NAILING RT TIB/FIB



COMPARISON:  None.



FLUOROSCOPY TIME:  1.2 minutes

4 Images saved to PACS



LIMITATIONS:  None.



PROCEDURE:  ORIF tibial fracture.



FINDINGS:  Images from fluoro document the placed along medullary davi in the tibia secured by screws 
at each hand.



IMPRESSION:  ORIF tibial fracture.  Refer to operative note for further information.



COMMENT:  PQRS [id number]:  Fluoroscopy time of the procedure is documented in the report.



TECHNICAL DOCUMENTATION:  JOB ID:  [id number]

 2011 [employer]- All Rights Reserved



Reading location - IP/workstation name: [email or web address]

## 2018-11-15 NOTE — RADIOLOGY REPORT (SQ)
CLINICAL DATA:



48-year-old female who slipped in the bathroom, heavily medicated



TECHNICAL DATA: 



Multiple axial CT images of the brain were performed followed by

sagittal and coronal reconstructed images. The CT study is

performed according to ALARA (as low as reasonably achievable) or

ALARA/IMAGE GENTLY, with automatic adjustment of mA and/or kV

according to patient size.



Performed on: 11/15/2018 at 7:01 AM



Comparisons:  Prior head CT performed on 9/27/2018.



FINDINGS: 



There is no evidence of mass, acute mass effect or midline shift.

There are no acute extra-axial fluid collections.  There is no

evidence of acute intracranial hemorrhage.



The ventricles remain enlarged and are out of proportion to the

sulci. This appearance can be seen with normal pressure

hydrocephalus. There are scattered patchy areas of decreased

subcortical and periventricular attenuation most consistent with

mild chronic microangiopathy.. There are old left basal ganglia

lacunar infarcts.



There is no significant mucosal thickening of the paranasal

sinuses.



The mastoid air cells are clear.



The orbital contents are grossly unremarkable.



No acute osseous abnormalities are identified.



No focal soft tissue abnormalities are identified.



IMPRESSION:



1. There is no evidence of acute intracranial pathology.

2. Stable enlarged ventricles out of proportion to the sulci

which can be seen with normal pressure hydrocephalus.

3. 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

infarcts.
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
## 2018-11-15 NOTE — OPERATIVE REPORT
Operative Report


DATE OF SURGERY: 11/15/18


PREOPERATIVE DIAGNOSIS: Right tibia fracture


OPERATION: Open reduction internal fixation right tibial fracture


SURGEON: LEE LOPEZ


ANESTHESIA: GA


ESTIMATED BLOOD LOSS: 50


PROCEDURE: 





Implants used: [place] titanium tibial nail 9 mm x 295 mm, single proximal 

interlock, 90 degree distal interlocks x2





Procedure with the patient supine and operative table the right lower 

extremities prepped and draped sterile fashion.  Limb is elevated for 

exsanguination tourniquet inflated to 280 torr.  Longitudinal incisions made 

over the medial aspect of the patellar tendon and sharp dissection is used to 

expose the proximal tibia.  Pin is placed into the proximal tibia and its 

position checked fluoroscopy and felt to be adequate.





A combined reamer was then used to fashion a cortical opening.  The pin and the 

reamer removed and a ball-tipped guide rods advanced down the tibia across the 

fracture site.  Tibial length measured to be approximately 300 mm.  

Subsequently a flexible reamers and then used to enlarge the tibial 

intramedullary canal to 10.5 mm.  Subsequently a 9 x 295 mm titanium nail was 

advanced over the ball-tipped guide davi.  Secured with a single proximal screw.

  Distally is secured with a single medial lateral screw and subsequently a 

single anterior posterior screw.  Fracture reduction hardware placement checked 

fluoroscopically and felt to be adequate.





The tourniquet is deflated.  Hemostasis obtained with electrocautery.  The 

wounds irrigated bulb lavage.  This opsoclonus in layers with interrupted 

Vicryl followed by staples.  A sterile compressive dressing was applied and the 

patient's return to PACU in satisfactory condition.

## 2018-11-15 NOTE — RADIOLOGY REPORT (SQ)
CLINICAL DATA:



48-year-old male with right leg pain status post trauma



TECHNICAL DATA:



Two x-ray views of the right ankle were performed on 11/15/2018

at 6:29 AM.



COMPARISONS: None



FINDINGS:



There is a displaced oblique fracture through the distal right

tibial diaphysis with lateral displacement of the distal fracture

fragment and nondisplaced oblique fracture through the distal

fibular diaphysis with medial displacement of the distal fracture

fragment. The ankle joint is preserved. No additional fractures

are identified. There is soft tissue swelling surrounding the

fractures. There are no focal lytic or sclerotic bone lesions. No

significant arthritis or degenerative changes identified.

 

Bone mineralization is normal.



IMPRESSION:



Displaced oblique fractures of the distal fibular and tibial

diaphyses as described above.
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
## 2018-11-15 NOTE — ER DOCUMENT REPORT
ED Extremity Problem, Lower





- General


Chief Complaint: Fall Injury


Stated Complaint: LEG INJURY


Time Seen by Provider: 11/15/18 06:19


TRAVEL OUTSIDE OF THE [place] IN LAST 30 DAYS: No





- HPI


Notes: 





Patient is a 48-year-old male that presents to the emergency department for 

chief complaint of right leg injury.


Patient states he was getting out of bed to use the restroom and lost his 

balance.  He fell to the ground in the bathroom.  He denies any head injury.  

Patient's significant other heard him fall and found him in the bathroom.  He 

was complaining of right leg pain and she noticed a deformity in his right 

lower leg.  Patient received Toradol and fentanyl from EMS prior to arrival and 

states he is currently not having any pain.  Patient has a history of spina 

bifida, tethered cord and hydrocephalus and has poor balance.  He has had 

frequent falls in the past.  He denies any syncope or lightheadedness today.  

His last p.o. intake was at 11 PM last night and was soda.





Past Medical History: Hypertension, diabetes, history of MI, neurogenic bladder

, hydrocephalus, tethered spine, spina bifida





Past Surgical History: Right total hip arthroplasty, left hip fracture repair





Social History: Occasional tobacco.  Denies drugs and alcohol





Family History: Reviewed and noncontributory for presenting illness





Allergies: Reviewed, see documented allergy list.








REVIEW OF SYSTEMS:





CONSTITUTIONAL : 





No fever





No chills





No diaphoresis





No recent illness





EENT:





No vision changes





No congestion





No sore throat  





CARDIOVASCULAR:





No chest pain





No palpitations





RESPIRATORY:





No shortness of breath





No cough





No difficulty breathing





GASTROINTESTINAL: 





No abdominal pain





No nausea





No vomiting





No diarrhea





GENITOURINARY:





No dysuria





No hematuria





No difficulty urinating





MUSCULOSKELETAL:





No back pain





 leg pain





No arm pain





SKIN:  





No rashes





No lesions





LYMPHATIC: 





No swollen, enlarged glands.





NEUROLOGICAL: 





No lightheadedness





No headache





No weakness





No paresthesias





PSYCHIATRIC:





No anxiety





No depression 








PHYSICAL EXAMINATION:





Vital signs reviewed, nursing noted reviewed.





GENERAL: Well-appearing, well-nourished and in no acute distress.





HEAD: No cephalhematoma, normocephalic.





EYES: Eyes appear normal, extraocular movements intact, sclera anicteric, 

conjunctiva are normal.





ENT: nares patent, oropharynx clear without exudates.  Moist mucous membranes.





NECK: Normal range of motion, supple without lymphadenopathy





LUNGS: Breath sounds clear to auscultation bilaterally and equal.  No wheezes 

rales or rhonchi.





HEART: Regular rate and rhythm without murmurs.  2/4 left DP and PT pulse, 

dopplerable right DP and PT pulse





ABDOMEN: Soft, nontender, normoactive bowel sounds.  No rebound, guarding, or 

rigidity. No masses appreciated.





EXTREMITIES: Right distal tibia deformity with no stability in right lower leg, 

Severely painful to palpation, Skin intact.  Pelvis stable with no hip 

tenderness bilaterally





NEUROLOGICAL: No focal neurological deficits. Moves all extremities 

spontaneously Motor and sensory grossly intact on exam.





PSYCH: Normal mood, normal affect.





SKIN: Warm, Dry, normal turgor, small abrasion to forehead











- Related Data


Allergies/Adverse Reactions: 


 





bee pollen [Bee Pollen] Allergy (Verified 08/15/18 08:55)


 


ketorolac [From Toradol] Allergy (Verified 08/15/18 08:55)


 


venom-wasp Allergy (Verified 08/15/18 08:55)


 











Past Medical History





- Social History


Smoking Status: Current Some Day Smoker


Family History: Reviewed & Not Pertinent, COPD, DM





- Past Medical History


Cardiac Medical History: Reports: Hx Congestive Heart Failure, Hx Coronary 

Artery Disease, Hx Heart Attack, Hx Hypertension


Pulmonary Medical History: Reports: Hx Bronchitis, Hx COPD, Hx Pneumonia


Neurological Medical History: Reports: Hx Cerebrovascular Accident, Hx Migraine

, Hx Seizures


Endocrine Medical History: Reports: Hx Diabetes Mellitus Type 1


Renal/ Medical History: Reports: Hx Benign Prostatic Hyperplasia, Hx Kidney 

Stones.  Denies: Hx Peritoneal Dialysis


GI Medical History: Reports: Hx Gastroesophageal Reflux Disease, Hx Ulcer


Musculoskeletal Medical History: Reports Hx Arthritis


Psychiatric Medical History: Reports: Hx Anxiety - YES,CONTROLLED, Hx Attention 

Deficit Hyperactivity Disorder, Hx Depression, Hx Personality Disorder


Past Surgical History: Reports: Hx Open Heart Surgery - correction of heart 

murmur, Hx Orthopedic Surgery - partial left hip replacement, Hx Tonsillectomy, 

Other - Ventral septal defect repair as infant





- Immunizations


Hx Diphtheria, Pertussis, Tetanus Vaccination: No - unk


Hx Pneumococcal Vaccination: 01/01/15





Review of Systems





- Review of Systems


Notes: 





Dictated





Physical Exam





- Vital signs


Vitals: 


 











Resp Pulse Ox


 


 13   95 


 


 11/15/18 06:24  11/15/18 06:24














- Notes


Notes: 





Dictated





Course





- Re-evaluation


Re-evalutation: 





11/15/18 06:41


Vitals reviewed.  Nursing notes reviewed.  Patient received pain medicine by 

EMS prior to arrival however after imaging he had return of pain and was given 

another dose of fentanyl.  Patient does have dopplerable pulses in his right 

foot.  Case is discussed with Dr. Sanchez who evaluated the patient in the 

emergency room.  Patient likely going to the operating room today for surgical 

fixation.





Laboratory











  11/15/18





  07:18


 


WBC  15.1 H


 


RBC  4.45


 


Hgb  12.5 L


 


Hct  37.5 L


 


MCV  84


 


MCH  28.0


 


MCHC  33.3


 


RDW  15.9 H


 


Plt Count  232


 


Seg Neutrophils %  86.2 H


 


Lymphocytes %  7.2 L


 


Monocytes %  4.9


 


Eosinophils %  1.0


 


Basophils %  0.7


 


Absolute Neutrophils  13.0 H


 


Absolute Lymphocytes  1.1


 


Absolute Monocytes  0.7


 


Absolute Eosinophils  0.2


 


Absolute Basophils  0.1











11/15/18 07:39


Case discussed with Dr. Weiland who accepted admission. 





- Vital Signs


Vital signs: 


 











Temp Pulse Resp BP Pulse Ox


 


 97.6 F      17   157/99 H  96 


 


 11/15/18 06:43     11/15/18 06:43  11/15/18 06:43  11/15/18 06:43














- Laboratory


Result Diagrams: 


 11/15/18 07:18





 11/15/18 07:18


Laboratory results interpreted by me: 


 











  11/15/18





  07:18


 


WBC  15.1 H


 


Hgb  12.5 L


 


Hct  37.5 L


 


RDW  15.9 H


 


Seg Neutrophils %  86.2 H


 


Lymphocytes %  7.2 L


 


Absolute Neutrophils  13.0 H














Discharge





- Discharge


Clinical Impression: 


Fracture of distal end of tibia


Qualifiers:


 Encounter type: initial encounter Fracture type: closed Fracture morphology: 

other fracture Laterality: right Qualified Code(s): S82.391A - Other fracture 

of lower end of right tibia, initial encounter for closed fracture





Fracture of distal fibula


Qualifiers:


 Encounter type: initial encounter Fracture type: closed Fracture morphology: 

unspecified fracture morphology Laterality: right Qualified Code(s): S82.831A - 

Other fracture of upper and lower end of right fibula, initial encounter for 

closed fracture





Condition: Stable


Disposition: ADMITTED AS INPATIENT


Admitting Provider: Hospitalist


Unit Admitted: Medical Floor


Referrals: 


CRISTIAN BRINK MD [[facility]] - Follow up as needed

## 2018-11-16 LAB
ADD MANUAL DIFF: NO
ALBUMIN SERPL-MCNC: 3.5 G/DL (ref 3.5–5)
ALP SERPL-CCNC: 100 U/L (ref 38–126)
ALT SERPL-CCNC: 13 U/L (ref 21–72)
ANION GAP SERPL CALC-SCNC: 12 MMOL/L (ref 5–19)
AST SERPL-CCNC: 25 U/L (ref 17–59)
BASOPHILS # BLD AUTO: 0.1 10^3/UL (ref 0–0.2)
BASOPHILS NFR BLD AUTO: 0.5 % (ref 0–2)
BILIRUB DIRECT SERPL-MCNC: 0.3 MG/DL (ref 0–0.4)
BILIRUB SERPL-MCNC: 0.5 MG/DL (ref 0.2–1.3)
BUN SERPL-MCNC: 15 MG/DL (ref 7–20)
CALCIUM: 8.8 MG/DL (ref 8.4–10.2)
CHLORIDE SERPL-SCNC: 96 MMOL/L (ref 98–107)
CO2 SERPL-SCNC: 24 MMOL/L (ref 22–30)
EOSINOPHIL # BLD AUTO: 0.2 10^3/UL (ref 0–0.6)
EOSINOPHIL NFR BLD AUTO: 1.6 % (ref 0–6)
ERYTHROCYTE [DISTWIDTH] IN BLOOD BY AUTOMATED COUNT: 15.9 % (ref 11.5–14)
GLUCOSE SERPL-MCNC: 209 MG/DL (ref 75–110)
HCT VFR BLD CALC: 33.1 % (ref 37.9–51)
HGB BLD-MCNC: 11 G/DL (ref 13.5–17)
LYMPHOCYTES # BLD AUTO: 1.6 10^3/UL (ref 0.5–4.7)
LYMPHOCYTES NFR BLD AUTO: 13.9 % (ref 13–45)
MCH RBC QN AUTO: 28.4 PG (ref 27–33.4)
MCHC RBC AUTO-ENTMCNC: 33.3 G/DL (ref 32–36)
MCV RBC AUTO: 85 FL (ref 80–97)
MONOCYTES # BLD AUTO: 0.9 10^3/UL (ref 0.1–1.4)
MONOCYTES NFR BLD AUTO: 7.7 % (ref 3–13)
NEUTROPHILS # BLD AUTO: 8.9 10^3/UL (ref 1.7–8.2)
NEUTS SEG NFR BLD AUTO: 76.3 % (ref 42–78)
PLATELET # BLD: 204 10^3/UL (ref 150–450)
POTASSIUM SERPL-SCNC: 4.5 MMOL/L (ref 3.6–5)
PROT SERPL-MCNC: 6.4 G/DL (ref 6.3–8.2)
RBC # BLD AUTO: 3.87 10^6/UL (ref 4.35–5.55)
SODIUM SERPL-SCNC: 131.9 MMOL/L (ref 137–145)
TOTAL CELLS COUNTED % (AUTO): 100 %
WBC # BLD AUTO: 11.7 10^3/UL (ref 4–10.5)

## 2018-11-16 RX ADMIN — INSULIN LISPRO PRN UNIT: 100 INJECTION, SOLUTION INTRAVENOUS; SUBCUTANEOUS at 11:59

## 2018-11-16 RX ADMIN — BENZTROPINE MESYLATE SCH: 1 TABLET ORAL at 11:15

## 2018-11-16 RX ADMIN — BUSPIRONE HYDROCHLORIDE SCH MG: 10 TABLET ORAL at 21:55

## 2018-11-16 RX ADMIN — TIOTROPIUM BROMIDE SCH: 18 CAPSULE ORAL; RESPIRATORY (INHALATION) at 17:14

## 2018-11-16 RX ADMIN — IPRATROPIUM BROMIDE AND ALBUTEROL SULFATE PRN ML: 2.5; .5 SOLUTION RESPIRATORY (INHALATION) at 14:30

## 2018-11-16 RX ADMIN — BENZTROPINE MESYLATE SCH MG: 1 TABLET ORAL at 17:18

## 2018-11-16 RX ADMIN — MORPHINE SULFATE PRN MG: 10 INJECTION INTRAMUSCULAR; INTRAVENOUS; SUBCUTANEOUS at 08:28

## 2018-11-16 RX ADMIN — BENZTROPINE MESYLATE SCH MG: 1 TABLET ORAL at 11:15

## 2018-11-16 RX ADMIN — FLUOXETINE SCH MG: 20 CAPSULE ORAL at 21:54

## 2018-11-16 RX ADMIN — OXYCODONE HYDROCHLORIDE PRN MG: 5 TABLET ORAL at 18:58

## 2018-11-16 RX ADMIN — Medication SCH ML: at 21:57

## 2018-11-16 RX ADMIN — ASPIRIN SCH MG: 81 TABLET, COATED ORAL at 10:49

## 2018-11-16 RX ADMIN — ASPRIN AND EXTENDED-RELEASE DIPYRIDAMOLE SCH CAP.SR: 25; 200 CAPSULE ORAL at 11:14

## 2018-11-16 RX ADMIN — OXCARBAZEPINE SCH: 150 TABLET, FILM COATED ORAL at 17:14

## 2018-11-16 RX ADMIN — TAMSULOSIN HYDROCHLORIDE SCH: 0.4 CAPSULE ORAL at 21:20

## 2018-11-16 RX ADMIN — OXYCODONE HYDROCHLORIDE PRN MG: 5 TABLET ORAL at 03:25

## 2018-11-16 RX ADMIN — MORPHINE SULFATE PRN MG: 10 INJECTION INTRAMUSCULAR; INTRAVENOUS; SUBCUTANEOUS at 13:49

## 2018-11-16 RX ADMIN — Medication SCH: at 17:27

## 2018-11-16 RX ADMIN — ASPRIN AND EXTENDED-RELEASE DIPYRIDAMOLE SCH CAP.SR: 25; 200 CAPSULE ORAL at 17:18

## 2018-11-16 RX ADMIN — ACETAMINOPHEN PRN MG: 325 TABLET ORAL at 03:23

## 2018-11-16 RX ADMIN — OLANZAPINE SCH MG: 5 TABLET, FILM COATED ORAL at 21:56

## 2018-11-16 RX ADMIN — BUSPIRONE HYDROCHLORIDE SCH MG: 10 TABLET ORAL at 10:50

## 2018-11-16 RX ADMIN — LEVOTHYROXINE SODIUM SCH: 25 TABLET ORAL at 21:21

## 2018-11-16 RX ADMIN — SULFAMETHOXAZOLE AND TRIMETHOPRIM SCH TAB: 800; 160 TABLET ORAL at 17:18

## 2018-11-16 RX ADMIN — CEFTRIAXONE SODIUM SCH MLS/HR: 2 INJECTION, POWDER, FOR SOLUTION INTRAMUSCULAR; INTRAVENOUS at 17:18

## 2018-11-16 RX ADMIN — Medication SCH: at 21:21

## 2018-11-16 RX ADMIN — POTASSIUM CHLORIDE SCH MEQ: 750 TABLET, FILM COATED, EXTENDED RELEASE ORAL at 10:49

## 2018-11-16 RX ADMIN — METOPROLOL SUCCINATE SCH MG: 25 TABLET, EXTENDED RELEASE ORAL at 10:49

## 2018-11-16 RX ADMIN — LISINOPRIL SCH MG: 5 TABLET ORAL at 10:49

## 2018-11-16 RX ADMIN — SIMVASTATIN SCH MG: 40 TABLET, FILM COATED ORAL at 21:55

## 2018-11-16 RX ADMIN — OLANZAPINE SCH MG: 5 TABLET, FILM COATED ORAL at 10:49

## 2018-11-16 RX ADMIN — INSULIN LISPRO PRN UNIT: 100 INJECTION, SOLUTION INTRAVENOUS; SUBCUTANEOUS at 21:53

## 2018-11-16 RX ADMIN — FLUOXETINE SCH MG: 20 CAPSULE ORAL at 11:15

## 2018-11-16 RX ADMIN — OXCARBAZEPINE SCH MG: 150 TABLET, FILM COATED ORAL at 21:54

## 2018-11-16 RX ADMIN — TAMSULOSIN HYDROCHLORIDE SCH MG: 0.4 CAPSULE ORAL at 17:18

## 2018-11-16 RX ADMIN — LANSOPRAZOLE SCH MG: 30 TABLET, ORALLY DISINTEGRATING, DELAYED RELEASE ORAL at 10:50

## 2018-11-16 NOTE — EKG REPORT
SEVERITY:- BORDERLINE ECG -

SINUS TACHYCARDIA

BORDERLINE IVCD WITH LAD

:

Confirmed by: Vicky Frank 16-Nov-2018 10:46:14

## 2018-11-16 NOTE — EKG REPORT
SEVERITY:- OTHERWISE NORMAL ECG -

SINUS TACHYCARDIA

:

Confirmed by: Vicky Frank 16-Nov-2018 10:46:24

## 2018-11-16 NOTE — PDOC PROGRESS REPORT
Subjective


Progress Note for:: 11/16/18


Reason For Visit: 


RIGHT LEG FRACTURE


"My biggest problem is my previous doctor will give me enough pain medication".

  Nursing reports that the patient slept comfortably throughout the night





Physical Exam


Vital Signs: 


 











Temp Pulse Resp BP Pulse Ox


 


 36.4 C   93   16   98/77 L  99 


 


 11/16/18 03:22  11/16/18 03:22  11/16/18 03:22  11/16/18 03:22  11/16/18 03:22








 Intake & Output











 11/15/18 11/16/18 11/17/18





 06:59 06:59 06:59


 


Intake Total  1400 


 


Output Total  1835 


 


Balance  -435 


 


Weight  64 kg 











General appearance: PRESENT: no acute distress


Respiratory exam: PRESENT: unlabored


Cardiovascular exam: PRESENT: RRR


GI/Abdominal exam: PRESENT: soft


Rectal exam: PRESENT: deferred


Extremities exam: PRESENT: other - Right lower extremity dressing clean dry and 

intact.  Brisk capillary refill.





Results


Laboratory Results: 


 





 11/16/18 05:08 





 11/16/18 05:08 





 











  11/15/18 11/16/18 11/16/18





  11:56 05:08 05:08


 


WBC   


 


RBC   


 


Hgb   


 


Hct   


 


MCV   


 


MCH   


 


MCHC   


 


RDW   


 


Plt Count   


 


Seg Neutrophils %   


 


Lymphocytes %   


 


Monocytes %   


 


Eosinophils %   


 


Basophils %   


 


Absolute Neutrophils   


 


Absolute Lymphocytes   


 


Absolute Monocytes   


 


Absolute Eosinophils   


 


Absolute Basophils   


 


Sodium   131.9 L 


 


Potassium   4.5 


 


Chloride   96 L 


 


Carbon Dioxide   24 


 


Anion Gap   12 


 


BUN   15 


 


Creatinine   1.49 H 


 


Est GFR ( Amer)   > 60 


 


Est GFR (Non-Af Amer)   50 L 


 


Glucose   209 H 


 


Calcium   8.8 


 


Magnesium   1.5 L 


 


Total Bilirubin   0.5 


 


AST   25 


 


ALT   13 L 


 


Alkaline Phosphatase   100 


 


Total Protein   6.4 


 


Albumin   3.5 


 


TSH    2.12


 


Urine Color  YELLOW  


 


Urine Appearance  CLOUDY  


 


Urine pH  6.0  


 


Ur Specific Gravity  1.006  


 


Urine Protein  100 H  


 


Urine Glucose (UA)  NEGATIVE  


 


Urine Ketones  NEGATIVE  


 


Urine Blood  MODERATE H  


 


Urine Nitrite  NEGATIVE  


 


Ur Leukocyte Esterase  LARGE H  


 


Urine WBC (Auto)  155  


 


Urine RBC (Auto)  12  














  11/16/18





  05:08


 


WBC  11.7 H


 


RBC  3.87 L


 


Hgb  11.0 L


 


Hct  33.1 L


 


MCV  85


 


MCH  28.4


 


MCHC  33.3


 


RDW  15.9 H


 


Plt Count  204


 


Seg Neutrophils %  76.3


 


Lymphocytes %  13.9


 


Monocytes %  7.7


 


Eosinophils %  1.6


 


Basophils %  0.5


 


Absolute Neutrophils  8.9 H


 


Absolute Lymphocytes  1.6


 


Absolute Monocytes  0.9


 


Absolute Eosinophils  0.2


 


Absolute Basophils  0.1


 


Sodium 


 


Potassium 


 


Chloride 


 


Carbon Dioxide 


 


Anion Gap 


 


BUN 


 


Creatinine 


 


Est GFR ( Amer) 


 


Est GFR (Non-Af Amer) 


 


Glucose 


 


Calcium 


 


Magnesium 


 


Total Bilirubin 


 


AST 


 


ALT 


 


Alkaline Phosphatase 


 


Total Protein 


 


Albumin 


 


TSH 


 


Urine Color 


 


Urine Appearance 


 


Urine pH 


 


Ur Specific Gravity 


 


Urine Protein 


 


Urine Glucose (UA) 


 


Urine Ketones 


 


Urine Blood 


 


Urine Nitrite 


 


Ur Leukocyte Esterase 


 


Urine WBC (Auto) 


 


Urine RBC (Auto) 











Impressions: 


 





Chest X-Ray  11/15/18 00:00


IMPRESSION:  No acute findings


 








Fluoroscopy  11/15/18 00:00


IMPRESSION:  ORIF tibial fracture.  Refer to operative note for further 

information.


 








Pelvis X-Ray  11/15/18 00:00


IMPRESSION:


 


Postsurgical changes with heterotopic bone formation of the


proximal femurs bilaterally. No radiographic evidence for acute


fracture. Correlate with any history of inability to ambulate.


 


 


 2011 [email or web address]- All Rights Reserved


 








Tibia/Fibula X-Ray  11/15/18 00:00


IMPRESSION:  ORIF tibial fracture.  Refer to operative note for further 

information.


 








Ankle X-Ray  11/15/18 06:35


IMPRESSION:


 


Displaced oblique fractures of the distal fibular and tibial


diaphyses as described above.


 








Head CT  11/15/18 06:35


IMPRESSION:


 


1. There is no evidence of acute intracranial pathology.


2. Stable enlarged ventricles out of proportion to the sulci


which can be seen with normal pressure hydrocephalus.


3. 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


infarcts.


 











Status: Imported from PACS





Assessment & Plan





- Diagnosis


(1) Fracture of distal end of tibia


Qualifiers: 


   Encounter type: initial encounter   Fracture type: closed   Fracture 

morphology: other fracture   Laterality: right   Qualified Code(s): S82.391A - 

Other fracture of lower end of right tibia, initial encounter for closed 

fracture   


Is this a current diagnosis for this admission?: Yes   


Plan: 


48-year-old male status post open reduction internal fixation of a distal third 

right tibia fracture postop day 1.  Patient to be mobilized with physical 

therapy on a touchdown weightbearing restriction.  Anticipate the need for 

skilled nursing facility placement.








- Time


Time Spent with patient: 15-24 minutes


Anticipated discharge: SNF


Within: when bed available

## 2018-11-16 NOTE — PDOC PROGRESS REPORT
Subjective


Progress Note for:: 11/16/18


Subjective:: 





Still with significant pain in his right leg.  He had surgical repair of the 

fracture yesterday.


Reason For Visit: 


RIGHT LEG FRACTURE








Physical Exam


Vital Signs: 


 











Temp Pulse Resp BP Pulse Ox


 


 97.6 F   90   16   102/70   100 


 


 11/16/18 11:42  11/16/18 11:42  11/16/18 11:42  11/16/18 11:42  11/16/18 11:42








 Intake & Output











 11/15/18 11/16/18 11/17/18





 06:59 06:59 06:59


 


Intake Total  1400 


 


Output Total  1835 


 


Balance  -435 


 


Weight  64 kg 











General appearance: PRESENT: cooperative, mild distress


Neck exam: ABSENT: carotid bruit, JVD, lymphadenopathy


Respiratory exam: PRESENT: clear to auscultation david, symmetrical, unlabored.  

ABSENT: rales, stridor, wheezes


Cardiovascular exam: PRESENT: RRR, +S1, +S2


GI/Abdominal exam: PRESENT: normal bowel sounds, soft.  ABSENT: guarding, 

tenderness


Extremities exam: PRESENT: other - Right leg with Coban wrap


Neurological exam: PRESENT: alert, awake, oriented to person, oriented to place

, oriented to situation


Psychiatric exam: PRESENT: appropriate affect - Reflecting his pain





Results


Laboratory Results: 


 





 11/16/18 05:08 





 11/16/18 05:08 





 











  11/16/18 11/16/18 11/16/18





  05:08 05:08 05:08


 


WBC    11.7 H


 


RBC    3.87 L


 


Hgb    11.0 L


 


Hct    33.1 L


 


MCV    85


 


MCH    28.4


 


MCHC    33.3


 


RDW    15.9 H


 


Plt Count    204


 


Seg Neutrophils %    76.3


 


Lymphocytes %    13.9


 


Monocytes %    7.7


 


Eosinophils %    1.6


 


Basophils %    0.5


 


Absolute Neutrophils    8.9 H


 


Absolute Lymphocytes    1.6


 


Absolute Monocytes    0.9


 


Absolute Eosinophils    0.2


 


Absolute Basophils    0.1


 


Sodium  131.9 L  


 


Potassium  4.5  


 


Chloride  96 L  


 


Carbon Dioxide  24  


 


Anion Gap  12  


 


BUN  15  


 


Creatinine  1.49 H  


 


Est GFR ( Amer)  > 60  


 


Est GFR (Non-Af Amer)  50 L  


 


Glucose  209 H  


 


Calcium  8.8  


 


Magnesium  1.5 L  


 


Total Bilirubin  0.5  


 


AST  25  


 


ALT  13 L  


 


Alkaline Phosphatase  100  


 


Total Protein  6.4  


 


Albumin  3.5  


 


TSH   2.12 











Impressions: 


 





Chest X-Ray  11/15/18 00:00


IMPRESSION:  No acute findings


 








Fluoroscopy  11/15/18 00:00


IMPRESSION:  ORIF tibial fracture.  Refer to operative note for further 

information.


 








Pelvis X-Ray  11/15/18 00:00


IMPRESSION:


 


Postsurgical changes with heterotopic bone formation of the


proximal femurs bilaterally. No radiographic evidence for acute


fracture. Correlate with any history of inability to ambulate.


 


 


 2011 [employer]- All Rights Reserved


 








Tibia/Fibula X-Ray  11/15/18 00:00


IMPRESSION:  ORIF tibial fracture.  Refer to operative note for further 

information.


 








Ankle X-Ray  11/15/18 06:35


IMPRESSION:


 


Displaced oblique fractures of the distal fibular and tibial


diaphyses as described above.


 








Head CT  11/15/18 06:35


IMPRESSION:


 


1. There is no evidence of acute intracranial pathology.


2. Stable enlarged ventricles out of proportion to the sulci


which can be seen with normal pressure hydrocephalus.


3. 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


infarcts.


 














Assessment & Plan





- Diagnosis


(1) Displaced fracture of tibia


Qualifiers: 


   Encounter type: initial encounter   Tibia location: distal physis (incl. 

Salter[place]Prieto)   Laterality: right   Qualified Code(s): S89.101A - Unspecified 

physeal fracture of lower end of right tibia, initial encounter for closed 

fracture   


Is this a current diagnosis for this admission?: Yes   


Plan: 


The patient sustained a fracture of the right tibia and fibula after a fall 

today.  Orthopedic surgery has seen the patient and will be taking him to the 

OR for surgical repair.  From a medical standpoint he is cleared for surgery.  

He does not have a history of myocardial infarction with a negative stress test 

in February.  Dr. Frank will be seeing the patient as well.





11/16/2018-the patient had surgical repair of his right leg yesterday.  He 

still in pain.  Narcotic analgesia is available.








(2) Hydrocephalus


Is this a current diagnosis for this admission?: Yes   


Plan: 


The patient has normal pressure hydrocephalus.  He also has spina bifida.  He 

is awaiting neurosurgical consultation for shunt placement and surgical 

intervention for his spinal cord.  Unfortunately the significant gait 

abnormality has caused multiple falls.  He has worked with physical therapy.  

He does have a walker but it was difficult to use in the hotel that they are 

presently staying in.





11/16/2018-currently stable.  Awaiting neurosurgical evaluation for shunt 

placement.








(3) Spina bifida


Qualifiers: 


   Spinal region: unspecified   Presence of hydrocephalus: unspecified 

hydrocephalus presence   Qualified Code(s): Q05.9 - Spina bifida, unspecified   


Is this a current diagnosis for this admission?: Yes   


Plan: 


The patient has a history of spina bifida with a "tethered "spinal cord.  This 

has caused issues with his gait as noted above.  He also has a neurogenic 

bladder.  He straight catheterizes himself regularly.  Postoperatively we will 

leave a Scott catheter in place but look to remove it as soon as possible.





11/16/2018-currently stable.  Awaiting neurosurgical evaluation.








(4) Neurogenic bladder


Is this a current diagnosis for this admission?: Yes   


Plan: 


As noted above the patient straight catheterizes himself regularly.  He does 

report recurrent urinary tract infections.  He is currently on Bactrim for an 

episode of cystitis.  I will continue his Bactrim as an inpatient.  Urinalysis 

is pending.





The patient a Scott catheter placed in the OR.  We will leave it in place.  The 

urine is cloudy.  Culture is pending and preliminarily appears positive.  I 

will resume his Bactrim that he takes as an outpatient.  Once the sensitivities 

are available I can modify his antibiotic therapy appropriately.








(5) Coronary artery disease with hx of myocardial infarct w/o hx of CABG


Is this a current diagnosis for this admission?: Yes   


Plan: 


Currently stable.  No cardiac symptoms.  Continue medications as ordered.








(6) COPD (chronic obstructive pulmonary disease)


Qualifiers: 


   COPD type: unspecified COPD   Qualified Code(s): J44.9 - Chronic obstructive 

pulmonary disease, unspecified   


Is this a current diagnosis for this admission?: Yes   


Plan: 


The patient has history of chronic obstructive pulmonary disease but is 

currently asymptomatic.  I will continue his home regimen.  Oxygen will be 

available for any hypoxia.  DuoNeb nebulizer therapy will be available if 

needed.





11/16/2018-no dyspnea or wheezing.  Continue current regimen as ordered.








(7) Diabetes mellitus, insulin dependent (IDDM), uncontrolled


Qualifiers: 


   Glycemic state: with hyperglycemia   Qualified Code(s): E10.65 - Type 1 

diabetes mellitus with hyperglycemia   


Is this a current diagnosis for this admission?: Yes   


Plan: 


I will continue the patient's current insulin regimen.  For tonight he will 

receive a smaller dose of his long-acting insulin because he will be n.p.o. for 

most of the day.  He will be on a Humalog sliding scale as well.  A hemoglobin 

A1c is pending for further clarification of the patient's level of control.





His glucose readings are still high.  His hemoglobin A1c was 10.6.  I have 

increased his Levemir to 35 units daily.








(8) Hypothyroidism


Qualifiers: 


   Hypothyroidism type: unspecified   Qualified Code(s): E03.9 - Hypothyroidism

, unspecified   


Is this a current diagnosis for this admission?: Yes   


Plan: 


We will continue his current dose of levothyroxine.  He takes 25 mcg daily.








(9) Bipolar 1 disorder


Is this a current diagnosis for this admission?: Yes   


Plan: 


Patient is on multiple medications.  We will continue his current regimen.  

Currently he appears asymptomatic.  I will obtain more detailed information on 

the history of his mental illness.





11/16/2018-the patient is slightly agitated but I believe this is from his 

pain.  Continue current medications as ordered.








- Time


Time Spent with patient: 15-24 minutes


Medications reviewed and adjusted accordingly: Yes

## 2018-11-17 PROCEDURE — 3E02340 INTRODUCTION OF INFLUENZA VACCINE INTO MUSCLE, PERCUTANEOUS APPROACH: ICD-10-PCS | Performed by: INTERNAL MEDICINE

## 2018-11-17 RX ADMIN — Medication SCH ML: at 05:00

## 2018-11-17 RX ADMIN — FLUOXETINE SCH MG: 20 CAPSULE ORAL at 21:23

## 2018-11-17 RX ADMIN — LEVOTHYROXINE SODIUM SCH MG: 25 TABLET ORAL at 05:00

## 2018-11-17 RX ADMIN — BENZTROPINE MESYLATE SCH MG: 1 TABLET ORAL at 10:37

## 2018-11-17 RX ADMIN — SULFAMETHOXAZOLE AND TRIMETHOPRIM SCH TAB: 800; 160 TABLET ORAL at 10:36

## 2018-11-17 RX ADMIN — INSULIN GLARGINE SCH UNITS: 100 INJECTION, SOLUTION SUBCUTANEOUS at 23:29

## 2018-11-17 RX ADMIN — BUSPIRONE HYDROCHLORIDE SCH MG: 10 TABLET ORAL at 21:20

## 2018-11-17 RX ADMIN — TAMSULOSIN HYDROCHLORIDE SCH MG: 0.4 CAPSULE ORAL at 17:12

## 2018-11-17 RX ADMIN — METOPROLOL SUCCINATE SCH MG: 25 TABLET, EXTENDED RELEASE ORAL at 10:37

## 2018-11-17 RX ADMIN — OXYCODONE HYDROCHLORIDE PRN MG: 5 TABLET ORAL at 11:00

## 2018-11-17 RX ADMIN — OXCARBAZEPINE SCH MG: 150 TABLET, FILM COATED ORAL at 21:24

## 2018-11-17 RX ADMIN — Medication SCH ML: at 17:35

## 2018-11-17 RX ADMIN — LANSOPRAZOLE SCH MG: 30 TABLET, ORALLY DISINTEGRATING, DELAYED RELEASE ORAL at 08:57

## 2018-11-17 RX ADMIN — OLANZAPINE SCH MG: 5 TABLET, FILM COATED ORAL at 08:57

## 2018-11-17 RX ADMIN — LISINOPRIL SCH MG: 5 TABLET ORAL at 10:43

## 2018-11-17 RX ADMIN — BACLOFEN PRN MG: 10 TABLET ORAL at 16:52

## 2018-11-17 RX ADMIN — Medication SCH: at 23:21

## 2018-11-17 RX ADMIN — CEFTRIAXONE SODIUM SCH MLS/HR: 2 INJECTION, POWDER, FOR SOLUTION INTRAMUSCULAR; INTRAVENOUS at 17:14

## 2018-11-17 RX ADMIN — ACETAMINOPHEN PRN MG: 325 TABLET ORAL at 08:57

## 2018-11-17 RX ADMIN — BUSPIRONE HYDROCHLORIDE SCH MG: 10 TABLET ORAL at 10:37

## 2018-11-17 RX ADMIN — POTASSIUM CHLORIDE SCH MEQ: 750 TABLET, FILM COATED, EXTENDED RELEASE ORAL at 10:36

## 2018-11-17 RX ADMIN — ASPIRIN SCH MG: 81 TABLET, COATED ORAL at 10:37

## 2018-11-17 RX ADMIN — SULFAMETHOXAZOLE AND TRIMETHOPRIM SCH TAB: 800; 160 TABLET ORAL at 17:13

## 2018-11-17 RX ADMIN — SIMVASTATIN SCH MG: 40 TABLET, FILM COATED ORAL at 21:21

## 2018-11-17 RX ADMIN — INSULIN LISPRO PRN UNIT: 100 INJECTION, SOLUTION INTRAVENOUS; SUBCUTANEOUS at 12:48

## 2018-11-17 RX ADMIN — DOCUSATE SODIUM SCH MG: 100 CAPSULE, LIQUID FILLED ORAL at 17:14

## 2018-11-17 RX ADMIN — OXCARBAZEPINE SCH MG: 150 TABLET, FILM COATED ORAL at 10:36

## 2018-11-17 RX ADMIN — OLANZAPINE SCH MG: 5 TABLET, FILM COATED ORAL at 21:21

## 2018-11-17 RX ADMIN — OXYCODONE HYDROCHLORIDE PRN MG: 5 TABLET ORAL at 04:59

## 2018-11-17 RX ADMIN — ACETAMINOPHEN PRN MG: 325 TABLET ORAL at 17:34

## 2018-11-17 RX ADMIN — ASPRIN AND EXTENDED-RELEASE DIPYRIDAMOLE SCH CAP.SR: 25; 200 CAPSULE ORAL at 17:13

## 2018-11-17 RX ADMIN — BENZTROPINE MESYLATE SCH MG: 1 TABLET ORAL at 17:13

## 2018-11-17 RX ADMIN — FLUOXETINE SCH MG: 20 CAPSULE ORAL at 10:38

## 2018-11-17 RX ADMIN — TIOTROPIUM BROMIDE SCH CAP: 18 CAPSULE ORAL; RESPIRATORY (INHALATION) at 12:47

## 2018-11-17 RX ADMIN — ASPRIN AND EXTENDED-RELEASE DIPYRIDAMOLE SCH CAP.SR: 25; 200 CAPSULE ORAL at 10:38

## 2018-11-17 RX ADMIN — OXYCODONE HYDROCHLORIDE PRN MG: 5 TABLET ORAL at 17:35

## 2018-11-17 NOTE — PDOC PROGRESS REPORT
Subjective


Progress Note for:: 11/16/18


Subjective:: 





Patient status post surgery for fracture.  Pt is denying any chest arm or neck 

discomfort.  Patient denying any PND, orthopnea.  Patient denied any sustained 

palpitations, dizziness, syncope, near syncope.  Patient denying any fever 

chills.  Patient denying any other significant discomfort.  





Patient is maintaining sinus rhythm.





Review of systems: Rest review of systems negative.





Medications: Medications have been reviewed.


Reason For Visit: 


RIGHT LEG FRACTURE








Physical Exam


Vital Signs: 


 











Temp Pulse Resp BP Pulse Ox


 


 98.0 F   109 H  19   143/72 H  98 


 


 11/16/18 19:33  11/16/18 19:33  11/16/18 19:33  11/16/18 19:33  11/16/18 19:33








 Intake & Output











 11/15/18 11/16/18 11/17/18





 06:59 06:59 06:59


 


Intake Total  1400 737


 


Output Total  1835 


 


Balance  -435 737


 


Weight  64 kg 











Exam: 








GENERAL: well-nourished and in no acute distress.  Alert and oriented x3


HEAD: Atraumatic, normocephalic.


EYES: GENA, sclera anicteric, conjunctiva are normal.


ENT: Moist mucous membranes.  No oral ulcerations or bleeding gums noted.  No 

obvious ear, nose or throat abnormalities noted.


NECK: supple without lymphadenopathy.  Trachea is central.  No cervical or 

axillary lymphadenopathy noted.  Carotids are 2+, JVD WNL 


LUNGS: Breath sounds clear bilaterally. No wheezes rales or rhonchi noted.  No 

significant dullness noted on percussion.


CHEST: Palpation of the chest wall shows no significant chest wall tenderness. 


HEART: [place] NP, No PSH,  1/6 REAL aortic area, 1/6 pan systolic murmur mitral 

area, no rubs, no gallops.


ABDOMEN: Soft, no significant tenderness appreciated, normoactive bowel sounds. 

No guarding, no rebound.  No rigidity noted . No masses appreciated.


EXTREMITIES: Pedal pulses are 1-2+, no calf tenderness noted. No clubbing or 

cyanosis. negative pedal edema noted


NEUROLOGICAL: Findings are spina bifid with mild paraparesis and incontinence


PSYCH: Normal mood, normal affect.  Judgment and insight within normal limits.


SKIN: No significant ecchymosis, skin is noted to be warm.


MUSCULOSKELETAL EXAM: No significant acute joint swelling noted.  Status post 

surgery for right leg fracture





Results


Laboratory Results: 


 





 11/16/18 05:08 





 11/16/18 05:08 





 











  11/16/18 11/16/18 11/16/18





  05:08 05:08 05:08


 


WBC    11.7 H


 


RBC    3.87 L


 


Hgb    11.0 L


 


Hct    33.1 L


 


MCV    85


 


MCH    28.4


 


MCHC    33.3


 


RDW    15.9 H


 


Plt Count    204


 


Seg Neutrophils %    76.3


 


Lymphocytes %    13.9


 


Monocytes %    7.7


 


Eosinophils %    1.6


 


Basophils %    0.5


 


Absolute Neutrophils    8.9 H


 


Absolute Lymphocytes    1.6


 


Absolute Monocytes    0.9


 


Absolute Eosinophils    0.2


 


Absolute Basophils    0.1


 


Sodium  131.9 L  


 


Potassium  4.5  


 


Chloride  96 L  


 


Carbon Dioxide  24  


 


Anion Gap  12  


 


BUN  15  


 


Creatinine  1.49 H  


 


Est GFR ( Amer)  > 60  


 


Est GFR (Non-Af Amer)  50 L  


 


Glucose  209 H  


 


Calcium  8.8  


 


Magnesium  1.5 L  


 


Total Bilirubin  0.5  


 


AST  25  


 


ALT  13 L  


 


Alkaline Phosphatase  100  


 


Total Protein  6.4  


 


Albumin  3.5  


 


TSH   2.12 











EKG Comments: 





Shows sinus rhythm


Impressions: 


 





Chest X-Ray  11/15/18 00:00


IMPRESSION:  No acute findings


 








Fluoroscopy  11/15/18 00:00


IMPRESSION:  ORIF tibial fracture.  Refer to operative note for further 

information.


 








Pelvis X-Ray  11/15/18 00:00


IMPRESSION:


 


Postsurgical changes with heterotopic bone formation of the


proximal femurs bilaterally. No radiographic evidence for acute


fracture. Correlate with any history of inability to ambulate.


 


 


 2011 [employer]- All Rights Reserved


 








Tibia/Fibula X-Ray  11/15/18 00:00


IMPRESSION:  ORIF tibial fracture.  Refer to operative note for further 

information.


 








Ankle X-Ray  11/15/18 06:35


IMPRESSION:


 


Displaced oblique fractures of the distal fibular and tibial


diaphyses as described above.


 








Head CT  11/15/18 06:35


IMPRESSION:


 


1. There is no evidence of acute intracranial pathology.


2. Stable enlarged ventricles out of proportion to the sulci


which can be seen with normal pressure hydrocephalus.


3. 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


infarcts.


 














Assessment & Plan





- Diagnosis


(1) Preoperative cardiovascular examination


Is this a current diagnosis for this admission?: Yes   





(2) CAD (coronary artery disease)


Qualifiers: 


   Coronary Disease-Associated Artery/Lesion type: native artery   Native vs. 

transplanted heart: native heart   Associated angina: angina presence 

unspecified   Qualified Code(s): I25.10 - Atherosclerotic heart disease of 

native coronary artery without angina pectoris   


Is this a current diagnosis for this admission?: Yes   





(3) Dyslipidemia


Is this a current diagnosis for this admission?: Yes   





(4) Diabetes


Qualifiers: 


   Diabetes mellitus type: type 2   Diabetes mellitus long term insulin use: 

with long term use   Diabetes mellitus complication status: with kidney 

complications   Diabetes mellitus complication detail: with chronic kidney 

disease   Chronic kidney disease stage: stage 2 (mild)   Qualified Code(s): 

E11.22 - Type 2 diabetes mellitus with diabetic chronic kidney disease; N18.2 - 

Chronic kidney disease, stage 2 (mild); N18.2 - Chronic kidney disease, stage 2 

(mild); Z79.4 - Long term (current) use of insulin; Z79.4 - Long term (current) 

use of insulin; Z79.4 - Long term (current) use of insulin; Z79.4 - Long term (

current) use of insulin   


Is this a current diagnosis for this admission?: Yes   





(5) Hypertension


Qualifiers: 


   Hypertension type: essential hypertension   Qualified Code(s): I10 - 

Essential (primary) hypertension   


Is this a current diagnosis for this admission?: Yes   





(6) Spina bifida


Qualifiers: 


   Spinal region: unspecified   Presence of hydrocephalus: unspecified 

hydrocephalus presence   Qualified Code(s): Q05.9 - Spina bifida, unspecified   


Is this a current diagnosis for this admission?: Yes   





(7) Systolic dysfunction, left ventricle


Is this a current diagnosis for this admission?: Yes   





- Notes


Notes: 


2D echocardiogram had shown mildly depressed LVEF.  No significant valvular 

abnormalities noted.  Patient has done well postop.  Continue with pain control

, pulmonary toilet and DVT prophylaxis.  Continue patient's home medications.  

Will sign off.  Please reconsult if needed.








- Time


Time with patient: 15-25 minutes


Medications reviewed and adjusted accordingly: Yes

## 2018-11-17 NOTE — PDOC PROGRESS REPORT
Subjective


Progress Note for:: 11/17/18


Subjective:: 





Still with significant pain in his right leg.  He had surgical repair of the 

fracture yesterday.


11/17- Better today. Pain Better. Now Constipated.


Reason For Visit: 


RIGHT LEG FRACTURE








Physical Exam


Vital Signs: 


 











Temp Pulse Resp BP Pulse Ox


 


 98.4 F   96   16   118/79   99 


 


 11/17/18 12:01  11/17/18 12:01  11/17/18 12:01  11/17/18 12:01  11/17/18 12:01








 Intake & Output











 11/16/18 11/17/18 11/18/18





 06:59 06:59 06:59


 


Intake Total 1400 974 474


 


Output Total 1835 1400 2000


 


Balance -435 -284 -1524


 


Weight 64 kg 65.4 kg 











General appearance: PRESENT: cooperative, mild distress, well-developed


Neck exam: ABSENT: carotid bruit, JVD, lymphadenopathy


Respiratory exam: PRESENT: clear to auscultation david, symmetrical, unlabored.  

ABSENT: accessory muscle use, rales, stridor, wheezes


Cardiovascular exam: PRESENT: RRR, +S1, +S2


GI/Abdominal exam: PRESENT: normal bowel sounds, soft.  ABSENT: guarding, 

tenderness


Extremities exam: PRESENT: other - Dressing on right lower leg.


Neurological exam: PRESENT: alert, awake, oriented to person, oriented to place


Psychiatric exam: PRESENT: normal mood.  ABSENT: agitated, anxious





Results


Laboratory Results: 


 





 11/16/18 05:08 





 11/16/18 05:08 








Impressions: 


 





Chest X-Ray  11/15/18 00:00


IMPRESSION:  No acute findings


 








Fluoroscopy  11/15/18 00:00


IMPRESSION:  ORIF tibial fracture.  Refer to operative note for further 

information.


 








Pelvis X-Ray  11/15/18 00:00


IMPRESSION:


 


Postsurgical changes with heterotopic bone formation of the


proximal femurs bilaterally. No radiographic evidence for acute


fracture. Correlate with any history of inability to ambulate.


 


 


 2011 [employer]- All Rights Reserved


 








Tibia/Fibula X-Ray  11/15/18 00:00


IMPRESSION:  ORIF tibial fracture.  Refer to operative note for further 

information.


 








Ankle X-Ray  11/15/18 06:35


IMPRESSION:


 


Displaced oblique fractures of the distal fibular and tibial


diaphyses as described above.


 








Head CT  11/15/18 06:35


IMPRESSION:


 


1. There is no evidence of acute intracranial pathology.


2. Stable enlarged ventricles out of proportion to the sulci


which can be seen with normal pressure hydrocephalus.


3. 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


infarcts.


 














Assessment & Plan





- Diagnosis


(1) Displaced fracture of tibia


Qualifiers: 


   Encounter type: initial encounter   Tibia location: distal physis (incl. 

Salter-Prieto)   Laterality: right   Qualified Code(s): S89.101A - Unspecified 

physeal fracture of lower end of right tibia, initial encounter for closed 

fracture   


Is this a current diagnosis for this admission?: Yes   


Plan: 


The patient sustained a fracture of the right tibia and fibula after a fall 

today.  Orthopedic surgery has seen the patient and will be taking him to the 

OR for surgical repair.  From a medical standpoint he is cleared for surgery.  

He does not have a history of myocardial infarction with a negative stress test 

in February.  Dr. Frnak will be seeing the patient as well.





11/16/2018-the patient had surgical repair of his right leg yesterday.  He 

still in pain.  Narcotic analgesia is available.


11/17- Leg hurts today but the leg is hanging over the side of the bed. I 

encouraged elevation on a pillow.








(2) Hydrocephalus


Is this a current diagnosis for this admission?: Yes   


Plan: 


The patient has normal pressure hydrocephalus.  He also has spina bifida.  He 

is awaiting neurosurgical consultation for shunt placement and surgical 

intervention for his spinal cord.  Unfortunately the significant gait 

abnormality has caused multiple falls.  He has worked with physical therapy.  

He does have a walker but it was difficult to use in the hotel that they are 

presently staying in.





11/16/2018-currently stable.  Awaiting neurosurgical evaluation for shunt 

placement.


11/17- ibid








(3) Spina bifida


Qualifiers: 


   Spinal region: unspecified   Presence of hydrocephalus: unspecified 

hydrocephalus presence   Qualified Code(s): Q05.9 - Spina bifida, unspecified   


Is this a current diagnosis for this admission?: Yes   


Plan: 


The patient has a history of spina bifida with a "tethered "spinal cord.  This 

has caused issues with his gait as noted above.  He also has a neurogenic 

bladder.  He straight catheterizes himself regularly.  Postoperatively we will 

leave a Scott catheter in place but look to remove it as soon as possible.





11/16/2018-currently stable.  Awaiting neurosurgical evaluation.


11/17- ibid








(4) Neurogenic bladder


Is this a current diagnosis for this admission?: Yes   


Plan: 


As noted above the patient straight catheterizes himself regularly.  He does 

report recurrent urinary tract infections.  He is currently on Bactrim for an 

episode of cystitis.  I will continue his Bactrim as an inpatient.  Urinalysis 

is pending.





The patient a Scott catheter placed in the OR.  We will leave it in place.  The 

urine is cloudy.  Culture is pending and preliminarily appears positive.  I 

will resume his Bactrim that he takes as an outpatient.  Once the sensitivities 

are available I can modify his antibiotic therapy appropriately.


11/17- back on Bactrim. Urine Cx still pending.








(5) Cystitis


Is this a current diagnosis for this admission?: Yes   


Plan: 


11/17- Was on suppresive Abx daily. Resumed bactrim ds BID. Urine culture with 

gram neg bacilli. Final ID and Sensitivities pending.








(6) Coronary artery disease with hx of myocardial infarct w/o hx of CABG


Is this a current diagnosis for this admission?: Yes   


Plan: 


Currently stable.  No cardiac symptoms.  Continue medications as ordered.


11/17- No change in medications








(7) COPD (chronic obstructive pulmonary disease)


Qualifiers: 


   COPD type: unspecified COPD   Qualified Code(s): J44.9 - Chronic obstructive 

pulmonary disease, unspecified   


Is this a current diagnosis for this admission?: Yes   


Plan: 


The patient has history of chronic obstructive pulmonary disease but is 

currently asymptomatic.  I will continue his home regimen.  Oxygen will be 

available for any hypoxia.  DuoNeb nebulizer therapy will be available if 

needed.





11/16/2018-no dyspnea or wheezing.  Continue current regimen as ordered.





11/17- breathing comfortably. No change in treatment plan.








(8) Diabetes mellitus, insulin dependent (IDDM), uncontrolled


Qualifiers: 


   Glycemic state: with hyperglycemia   Qualified Code(s): E10.65 - Type 1 

diabetes mellitus with hyperglycemia   


Is this a current diagnosis for this admission?: Yes   


Plan: 


I will continue the patient's current insulin regimen.  For tonight he will 

receive a smaller dose of his long-acting insulin because he will be n.p.o. for 

most of the day.  He will be on a Humalog sliding scale as well.  A hemoglobin 

A1c is pending for further clarification of the patient's level of control.





His glucose readings are still high.  His hemoglobin A1c was 10.6.  I have 

increased his Levemir to 35 units daily.


11/17- Increased lantus to 40 units daily. continue sliding scale.








(9) Hypothyroidism


Qualifiers: 


   Hypothyroidism type: unspecified   Qualified Code(s): E03.9 - Hypothyroidism

, unspecified   


Is this a current diagnosis for this admission?: Yes   


Plan: 


We will continue his current dose of levothyroxine.  He takes 25 mcg daily.








(10) Bipolar 1 disorder


Is this a current diagnosis for this admission?: Yes   


Plan: 


Patient is on multiple medications.  We will continue his current regimen.  

Currently he appears asymptomatic.  I will obtain more detailed information on 

the history of his mental illness.





11/16/2018-the patient is slightly agitated but I believe this is from his 

pain.  Continue current medications as ordered.


11/17/18- Calm today. does respond to pain appropriately. In good spirits.








(11) Constipation


Qualifiers: 


   Constipation type: drug induced constipation   Qualified Code(s): K59.03 - 

Drug induced constipation   


Is this a current diagnosis for this admission?: Yes   


Plan: 


11/17- Likely due to pain meds. Will give Miralax now and make available PRN. 

Start colace 100 bid as well.








- Time


Time Spent with patient: 25-34 minutes


Medications reviewed and adjusted accordingly: Yes

## 2018-11-17 NOTE — EKG REPORT
SEVERITY:- OTHERWISE NORMAL ECG -

SINUS TACHYCARDIA

:

Confirmed by: Vicky Frank 17-Nov-2018 09:57:31

## 2018-11-18 LAB
ANION GAP SERPL CALC-SCNC: 12 MMOL/L (ref 5–19)
BUN SERPL-MCNC: 17 MG/DL (ref 7–20)
CALCIUM: 8.8 MG/DL (ref 8.4–10.2)
CHLORIDE SERPL-SCNC: 99 MMOL/L (ref 98–107)
CO2 SERPL-SCNC: 24 MMOL/L (ref 22–30)
ERYTHROCYTE [DISTWIDTH] IN BLOOD BY AUTOMATED COUNT: 16.4 % (ref 11.5–14)
GLUCOSE SERPL-MCNC: 113 MG/DL (ref 75–110)
HCT VFR BLD CALC: 27 % (ref 37.9–51)
HGB BLD-MCNC: 9.2 G/DL (ref 13.5–17)
MCH RBC QN AUTO: 28.9 PG (ref 27–33.4)
MCHC RBC AUTO-ENTMCNC: 34 G/DL (ref 32–36)
MCV RBC AUTO: 85 FL (ref 80–97)
PLATELET # BLD: 179 10^3/UL (ref 150–450)
POTASSIUM SERPL-SCNC: 4.5 MMOL/L (ref 3.6–5)
RBC # BLD AUTO: 3.18 10^6/UL (ref 4.35–5.55)
SODIUM SERPL-SCNC: 134.8 MMOL/L (ref 137–145)
WBC # BLD AUTO: 9.8 10^3/UL (ref 4–10.5)

## 2018-11-18 RX ADMIN — BENZTROPINE MESYLATE SCH MG: 1 TABLET ORAL at 17:23

## 2018-11-18 RX ADMIN — OLANZAPINE SCH MG: 5 TABLET, FILM COATED ORAL at 07:49

## 2018-11-18 RX ADMIN — OXYCODONE HYDROCHLORIDE PRN MG: 5 TABLET ORAL at 10:29

## 2018-11-18 RX ADMIN — INSULIN LISPRO PRN UNIT: 100 INJECTION, SOLUTION INTRAVENOUS; SUBCUTANEOUS at 17:24

## 2018-11-18 RX ADMIN — BUSPIRONE HYDROCHLORIDE SCH MG: 10 TABLET ORAL at 10:22

## 2018-11-18 RX ADMIN — OXCARBAZEPINE SCH MG: 150 TABLET, FILM COATED ORAL at 21:35

## 2018-11-18 RX ADMIN — FLUOXETINE SCH MG: 20 CAPSULE ORAL at 10:08

## 2018-11-18 RX ADMIN — LISINOPRIL SCH MG: 5 TABLET ORAL at 10:22

## 2018-11-18 RX ADMIN — Medication SCH ML: at 21:35

## 2018-11-18 RX ADMIN — BENZTROPINE MESYLATE SCH MG: 1 TABLET ORAL at 10:09

## 2018-11-18 RX ADMIN — SIMVASTATIN SCH MG: 40 TABLET, FILM COATED ORAL at 21:35

## 2018-11-18 RX ADMIN — FLUOXETINE SCH MG: 20 CAPSULE ORAL at 21:34

## 2018-11-18 RX ADMIN — INSULIN GLARGINE SCH UNITS: 100 INJECTION, SOLUTION SUBCUTANEOUS at 21:34

## 2018-11-18 RX ADMIN — LANSOPRAZOLE SCH MG: 30 TABLET, ORALLY DISINTEGRATING, DELAYED RELEASE ORAL at 07:49

## 2018-11-18 RX ADMIN — ASPIRIN SCH MG: 81 TABLET, COATED ORAL at 10:23

## 2018-11-18 RX ADMIN — SULFAMETHOXAZOLE AND TRIMETHOPRIM SCH TAB: 800; 160 TABLET ORAL at 10:08

## 2018-11-18 RX ADMIN — INSULIN LISPRO PRN UNIT: 100 INJECTION, SOLUTION INTRAVENOUS; SUBCUTANEOUS at 11:37

## 2018-11-18 RX ADMIN — TAMSULOSIN HYDROCHLORIDE SCH MG: 0.4 CAPSULE ORAL at 17:23

## 2018-11-18 RX ADMIN — OLANZAPINE SCH MG: 5 TABLET, FILM COATED ORAL at 21:36

## 2018-11-18 RX ADMIN — ACETAMINOPHEN PRN MG: 325 TABLET ORAL at 13:52

## 2018-11-18 RX ADMIN — LEVOTHYROXINE SODIUM SCH MG: 25 TABLET ORAL at 05:19

## 2018-11-18 RX ADMIN — POTASSIUM CHLORIDE SCH MEQ: 750 TABLET, FILM COATED, EXTENDED RELEASE ORAL at 10:23

## 2018-11-18 RX ADMIN — METOPROLOL SUCCINATE SCH MG: 25 TABLET, EXTENDED RELEASE ORAL at 10:21

## 2018-11-18 RX ADMIN — OXYCODONE HYDROCHLORIDE PRN MG: 5 TABLET ORAL at 17:47

## 2018-11-18 RX ADMIN — Medication SCH ML: at 05:18

## 2018-11-18 RX ADMIN — DOCUSATE SODIUM SCH MG: 100 CAPSULE, LIQUID FILLED ORAL at 10:22

## 2018-11-18 RX ADMIN — ASPRIN AND EXTENDED-RELEASE DIPYRIDAMOLE SCH CAP.SR: 25; 200 CAPSULE ORAL at 10:08

## 2018-11-18 RX ADMIN — TIOTROPIUM BROMIDE SCH CAP: 18 CAPSULE ORAL; RESPIRATORY (INHALATION) at 10:09

## 2018-11-18 RX ADMIN — OXCARBAZEPINE SCH MG: 150 TABLET, FILM COATED ORAL at 10:08

## 2018-11-18 RX ADMIN — CEFTRIAXONE SODIUM SCH MLS/HR: 2 INJECTION, POWDER, FOR SOLUTION INTRAMUSCULAR; INTRAVENOUS at 17:24

## 2018-11-18 RX ADMIN — Medication SCH ML: at 13:44

## 2018-11-18 RX ADMIN — DOCUSATE SODIUM SCH MG: 100 CAPSULE, LIQUID FILLED ORAL at 17:23

## 2018-11-18 RX ADMIN — ASPRIN AND EXTENDED-RELEASE DIPYRIDAMOLE SCH CAP.SR: 25; 200 CAPSULE ORAL at 17:23

## 2018-11-18 RX ADMIN — BUSPIRONE HYDROCHLORIDE SCH MG: 10 TABLET ORAL at 21:34

## 2018-11-18 NOTE — EKG REPORT
SEVERITY:- OTHERWISE NORMAL ECG -

SINUS TACHYCARDIA

:

Confirmed by: Vicky Frank 18-Nov-2018 10:51:36

## 2018-11-18 NOTE — PDOC PROGRESS REPORT
Subjective


Progress Note for:: 11/18/18


Subjective:: 





No issue overnight, stilkl having significant pain


Reason For Visit: 


RIGHT LEG FRACTURE








Physical Exam


Vital Signs: 


 











Temp Pulse Resp BP Pulse Ox


 


 37.8 C   117 H  16   130/74 H  96 


 


 11/18/18 08:15  11/18/18 08:20  11/18/18 08:20  11/18/18 08:15  11/18/18 08:20








 Intake & Output











 11/17/18 11/18/18 11/19/18





 06:59 06:59 06:59


 


Intake Total 974 1174 


 


Output Total 1400 6605 


 


Balance -156 -7791 


 


Weight 65.4 kg 65.3 kg 











General appearance: PRESENT: no acute distress


Adult Front & Back Image: 


  __________________________














  __________________________





 1 - Dressing is dry clean and intact.  Appropriate postoperative swelling with 

a good capillary refill.  Good sensation to light touch distally.  Decreased 

range of motion second to pain.








Results


Laboratory Results: 


 





 11/18/18 05:12 





 11/18/18 05:12 





 











  11/18/18 11/18/18





  05:12 05:12


 


WBC  9.8 


 


RBC  3.18 L 


 


Hgb  9.2 L 


 


Hct  27.0 L 


 


MCV  85 


 


MCH  28.9 


 


MCHC  34.0 


 


RDW  16.4 H 


 


Plt Count  179 


 


Sodium   134.8 L


 


Potassium   4.5


 


Chloride   99


 


Carbon Dioxide   24


 


Anion Gap   12


 


BUN   17


 


Creatinine   1.32 H


 


Est GFR ( Amer)   > 60


 


Est GFR (Non-Af Amer)   57 L


 


Glucose   113 H


 


Calcium   8.8


 


Magnesium   1.7








 





11/15/18 11:56   Catheterized Urine   Urine Culture - Final


                            Klebsiella Pneumoniae








Impressions: 


 





Chest X-Ray  11/15/18 00:00


IMPRESSION:  No acute findings


 








Fluoroscopy  11/15/18 00:00


IMPRESSION:  ORIF tibial fracture.  Refer to operative note for further 

information.


 








Pelvis X-Ray  11/15/18 00:00


IMPRESSION:


 


Postsurgical changes with heterotopic bone formation of the


proximal femurs bilaterally. No radiographic evidence for acute


fracture. Correlate with any history of inability to ambulate.


 


 


 2011 [employer]- All Rights Reserved


 








Tibia/Fibula X-Ray  11/15/18 00:00


IMPRESSION:  ORIF tibial fracture.  Refer to operative note for further 

information.


 








Ankle X-Ray  11/15/18 06:35


IMPRESSION:


 


Displaced oblique fractures of the distal fibular and tibial


diaphyses as described above.


 








Head CT  11/15/18 06:35


IMPRESSION:


 


1. There is no evidence of acute intracranial pathology.


2. Stable enlarged ventricles out of proportion to the sulci


which can be seen with normal pressure hydrocephalus.


3. 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


infarcts.


 











Status: Image reviewed by me





Assessment & Plan





- Diagnosis


(1) Displaced fracture of tibia


Qualifiers: 


   Encounter type: initial encounter   Tibia location: distal physis (incl. 

Salter-Prieto)   Laterality: right   Qualified Code(s): S89.101A - Unspecified 

physeal fracture of lower end of right tibia, initial encounter for closed 

fracture   


Is this a current diagnosis for this admission?: Yes   


Plan: 


Patient is postop day 3 from intramedullary nailing of right tibia fracture.


Patient continue with physical therapy


Continue pain control

## 2018-11-18 NOTE — PDOC PROGRESS REPORT
Subjective


Progress Note for:: 11/18/18


Subjective:: 





Still with significant pain in his right leg.  He had surgical repair of the 

fracture yesterday.


11/17- Better today. Pain Better. Now Constipated.


11/18/2018-still complaining of pain in the right leg.  He has not had a bowel 

movement and is distended.


Reason For Visit: 


RIGHT LEG FRACTURE








Physical Exam


Vital Signs: 


 











Temp Pulse Resp BP Pulse Ox


 


 100.0 F   117 H  16   130/74 H  96 


 


 11/18/18 08:15  11/18/18 08:20  11/18/18 08:20  11/18/18 08:15  11/18/18 08:20








 Intake & Output











 11/17/18 11/18/18 11/19/18





 06:59 06:59 06:59


 


Intake Total 974 1174 


 


Output Total 1400 2725 


 


Balance -426 -1551 


 


Weight 65.4 kg 65.3 kg 











General appearance: PRESENT: cooperative, mild distress, obese, well-developed


Respiratory exam: PRESENT: clear to auscultation david, symmetrical, unlabored.  

ABSENT: rales, rhonchi, wheezes


Cardiovascular exam: PRESENT: RRR, +S1, +S2


GI/Abdominal exam: PRESENT: distended, hypoactive bowel sounds, soft, other - 

Tympanitic.  ABSENT: tenderness


Neurological exam: PRESENT: alert, awake


Psychiatric exam: PRESENT: appropriate affect, normal mood.  ABSENT: agitated


Focused psych exam: ABSENT: restlessness





Results


Laboratory Results: 


 





 11/18/18 05:12 





 11/18/18 05:12 





 











  11/18/18 11/18/18





  05:12 05:12


 


WBC  9.8 


 


RBC  3.18 L 


 


Hgb  9.2 L 


 


Hct  27.0 L 


 


MCV  85 


 


MCH  28.9 


 


MCHC  34.0 


 


RDW  16.4 H 


 


Plt Count  179 


 


Sodium   134.8 L


 


Potassium   4.5


 


Chloride   99


 


Carbon Dioxide   24


 


Anion Gap   12


 


BUN   17


 


Creatinine   1.32 H


 


Est GFR ( Amer)   > 60


 


Est GFR (Non-Af Amer)   57 L


 


Glucose   113 H


 


Calcium   8.8


 


Magnesium   1.7








 





11/15/18 11:56   Catheterized Urine   Urine Culture - Final


                            Klebsiella Pneumoniae








Impressions: 


 





Chest X-Ray  11/15/18 00:00


IMPRESSION:  No acute findings


 








Fluoroscopy  11/15/18 00:00


IMPRESSION:  ORIF tibial fracture.  Refer to operative note for further 

information.


 








Pelvis X-Ray  11/15/18 00:00


IMPRESSION:


 


Postsurgical changes with heterotopic bone formation of the


proximal femurs bilaterally. No radiographic evidence for acute


fracture. Correlate with any history of inability to ambulate.


 


 


 2011 [email or web address]- All Rights Reserved


 








Tibia/Fibula X-Ray  11/15/18 00:00


IMPRESSION:  ORIF tibial fracture.  Refer to operative note for further 

information.


 








Ankle X-Ray  11/15/18 06:35


IMPRESSION:


 


Displaced oblique fractures of the distal fibular and tibial


diaphyses as described above.


 








Head CT  11/15/18 06:35


IMPRESSION:


 


1. There is no evidence of acute intracranial pathology.


2. Stable enlarged ventricles out of proportion to the sulci


which can be seen with normal pressure hydrocephalus.


3. 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


infarcts.


 














Assessment & Plan





- Diagnosis


(1) Displaced fracture of tibia


Qualifiers: 


   Encounter type: initial encounter   Tibia location: distal physis (incl. 

Salter-Prieto)   Laterality: right   Qualified Code(s): S89.101A - Unspecified 

physeal fracture of lower end of right tibia, initial encounter for closed 

fracture   


Is this a current diagnosis for this admission?: Yes   


Plan: 


The patient sustained a fracture of the right tibia and fibula after a fall 

today.  Orthopedic surgery has seen the patient and will be taking him to the 

OR for surgical repair.  From a medical standpoint he is cleared for surgery.  

He does not have a history of myocardial infarction with a negative stress test 

in February.  Dr. Frank will be seeing the patient as well.





11/16/2018-the patient had surgical repair of his right leg yesterday.  He 

still in pain.  Narcotic analgesia is available.


11/17- Leg hurts today but the leg is hanging over the side of the bed. I 

encouraged elevation on a pillow.


11/18/2018-management per orthopedic surgery








(2) Hydrocephalus


Is this a current diagnosis for this admission?: Yes   


Plan: 


The patient has normal pressure hydrocephalus.  He also has spina bifida.  He 

is awaiting neurosurgical consultation for shunt placement and surgical 

intervention for his spinal cord.  Unfortunately the significant gait 

abnormality has caused multiple falls.  He has worked with physical therapy.  

He does have a walker but it was difficult to use in the hotel that they are 

presently staying in.





11/16/2018-currently stable.  Awaiting neurosurgical evaluation for shunt 

placement.


11/17-stable no change


11/18/2018-stable no change.  Continue current regimen.








(3) Spina bifida


Qualifiers: 


   Spinal region: unspecified   Presence of hydrocephalus: unspecified 

hydrocephalus presence   Qualified Code(s): Q05.9 - Spina bifida, unspecified   


Is this a current diagnosis for this admission?: Yes   


Plan: 


The patient has a history of spina bifida with a "tethered "spinal cord.  This 

has caused issues with his gait as noted above.  He also has a neurogenic 

bladder.  He straight catheterizes himself regularly.  Postoperatively we will 

leave a Scott catheter in place but look to remove it as soon as possible.





11/16/2018-currently stable.  Awaiting neurosurgical evaluation.


11/17-stable no change continue current regimen


11/18/2018-as above








(4) Neurogenic bladder


Is this a current diagnosis for this admission?: Yes   


Plan: 


As noted above the patient straight catheterizes himself regularly.  He does 

report recurrent urinary tract infections.  He is currently on Bactrim for an 

episode of cystitis.  I will continue his Bactrim as an inpatient.  Urinalysis 

is pending.





The patient a Scott catheter placed in the OR.  We will leave it in place.  The 

urine is cloudy.  Culture is pending and preliminarily appears positive.  I 

will resume his Bactrim that he takes as an outpatient.  Once the sensitivities 

are available I can modify his antibiotic therapy appropriately.


11/17- back on Bactrim. Urine Cx still pending.


11/18/2018-cystitis with Klebsiella pneumonia.  Resistant to Bactrim.  The 

patient is on Rocephin.  The Klebsiella is sensitive to Rocephin.








(5) Cystitis


Is this a current diagnosis for this admission?: Yes   


Plan: 


11/17- Was on suppresive Abx daily. Resumed bactrim ds BID. Urine culture with 

gram neg bacilli. Final ID and Sensitivities pending.


11/18/2018-discontinue Bactrim.  Continue Rocephin as ordered.  The patient 

will likely need a different suppressive antibiotic.








(6) Coronary artery disease with hx of myocardial infarct w/o hx of CABG


Is this a current diagnosis for this admission?: Yes   


Plan: 


Currently stable.  No cardiac symptoms.  Continue medications as ordered.


11/17- No change in medications


11/18/2018-no cardiac symptoms.  Continue current regimen.








(7) COPD (chronic obstructive pulmonary disease)


Qualifiers: 


   COPD type: unspecified COPD   Qualified Code(s): J44.9 - Chronic obstructive 

pulmonary disease, unspecified   


Is this a current diagnosis for this admission?: Yes   


Plan: 


The patient has history of chronic obstructive pulmonary disease but is 

currently asymptomatic.  I will continue his home regimen.  Oxygen will be 

available for any hypoxia.  DuoNeb nebulizer therapy will be available if 

needed.





11/16/2018-no dyspnea or wheezing.  Continue current regimen as ordered.





11/17- breathing comfortably. No change in treatment plan.


11/18/2018-patient is stable.  Continue current regimen.








(8) Diabetes mellitus, insulin dependent (IDDM), uncontrolled


Qualifiers: 


   Glycemic state: with hyperglycemia   Qualified Code(s): E10.65 - Type 1 

diabetes mellitus with hyperglycemia   


Is this a current diagnosis for this admission?: Yes   


Plan: 


I will continue the patient's current insulin regimen.  For tonight he will 

receive a smaller dose of his long-acting insulin because he will be n.p.o. for 

most of the day.  He will be on a Humalog sliding scale as well.  A hemoglobin 

A1c is pending for further clarification of the patient's level of control.





His glucose readings are still high.  His hemoglobin A1c was 10.6.  I have 

increased his Levemir to 35 units daily.


11/17- Increased lantus to 40 units daily. continue sliding scale.





11/18/2018-still with some glucose instability.  We will monitor the pattern 

for another day and probably make adjustments in his Lantus.  He may need twice 

daily dosing.








(9) Hypothyroidism


Qualifiers: 


   Hypothyroidism type: unspecified   Qualified Code(s): E03.9 - Hypothyroidism

, unspecified   


Is this a current diagnosis for this admission?: Yes   


Plan: 


We will continue his current dose of levothyroxine.  He takes 25 mcg daily.








(10) Bipolar 1 disorder


Is this a current diagnosis for this admission?: Yes   


Plan: 


Patient is on multiple medications.  We will continue his current regimen.  

Currently he appears asymptomatic.  I will obtain more detailed information on 

the history of his mental illness.





11/16/2018-the patient is slightly agitated but I believe this is from his 

pain.  Continue current medications as ordered.


11/17/18- Calm today. does respond to pain appropriately. In good spirits.


11/18/2018-seems uncomfortable today.  This is likely due to constipation.  No 

change in current regimen.








(11) Constipation


Qualifiers: 


   Constipation type: drug induced constipation   Qualified Code(s): K59.03 - 

Drug induced constipation   


Is this a current diagnosis for this admission?: Yes   


Plan: 


11/17- Likely due to pain meds. Will give Miralax now and make available PRN. 

Start colace 100 bid as well.


11/18/2018-still with no bowel movement.  Distended and tympanitic.  I will 

administer a bottle of magnesium citrate and a more aggressive bowel regimen.








- Time


Time Spent with patient: 15-24 minutes

## 2018-11-19 LAB
ANION GAP SERPL CALC-SCNC: 13 MMOL/L (ref 5–19)
BUN SERPL-MCNC: 19 MG/DL (ref 7–20)
CALCIUM: 9.1 MG/DL (ref 8.4–10.2)
CHLORIDE SERPL-SCNC: 96 MMOL/L (ref 98–107)
CO2 SERPL-SCNC: 25 MMOL/L (ref 22–30)
ERYTHROCYTE [DISTWIDTH] IN BLOOD BY AUTOMATED COUNT: 16.1 % (ref 11.5–14)
GLUCOSE SERPL-MCNC: 91 MG/DL (ref 75–110)
HCT VFR BLD CALC: 27 % (ref 37.9–51)
HGB BLD-MCNC: 9.1 G/DL (ref 13.5–17)
MCH RBC QN AUTO: 28.6 PG (ref 27–33.4)
MCHC RBC AUTO-ENTMCNC: 33.6 G/DL (ref 32–36)
MCV RBC AUTO: 85 FL (ref 80–97)
PLATELET # BLD: 202 10^3/UL (ref 150–450)
POTASSIUM SERPL-SCNC: 4.8 MMOL/L (ref 3.6–5)
RBC # BLD AUTO: 3.17 10^6/UL (ref 4.35–5.55)
SODIUM SERPL-SCNC: 134.2 MMOL/L (ref 137–145)
WBC # BLD AUTO: 9.2 10^3/UL (ref 4–10.5)

## 2018-11-19 RX ADMIN — BUSPIRONE HYDROCHLORIDE SCH MG: 10 TABLET ORAL at 21:45

## 2018-11-19 RX ADMIN — TAMSULOSIN HYDROCHLORIDE SCH MG: 0.4 CAPSULE ORAL at 17:19

## 2018-11-19 RX ADMIN — LEVOTHYROXINE SODIUM SCH MG: 25 TABLET ORAL at 05:06

## 2018-11-19 RX ADMIN — SIMVASTATIN SCH MG: 40 TABLET, FILM COATED ORAL at 21:45

## 2018-11-19 RX ADMIN — BENZTROPINE MESYLATE SCH MG: 1 TABLET ORAL at 17:15

## 2018-11-19 RX ADMIN — FLUOXETINE SCH MG: 20 CAPSULE ORAL at 09:10

## 2018-11-19 RX ADMIN — INSULIN GLARGINE SCH UNITS: 100 INJECTION, SOLUTION SUBCUTANEOUS at 21:45

## 2018-11-19 RX ADMIN — OXYCODONE HYDROCHLORIDE PRN MG: 5 TABLET ORAL at 01:46

## 2018-11-19 RX ADMIN — FLUOXETINE SCH MG: 20 CAPSULE ORAL at 21:46

## 2018-11-19 RX ADMIN — OLANZAPINE SCH MG: 5 TABLET, FILM COATED ORAL at 09:05

## 2018-11-19 RX ADMIN — Medication SCH ML: at 17:11

## 2018-11-19 RX ADMIN — Medication SCH ML: at 21:47

## 2018-11-19 RX ADMIN — POTASSIUM CHLORIDE SCH MEQ: 750 TABLET, FILM COATED, EXTENDED RELEASE ORAL at 09:05

## 2018-11-19 RX ADMIN — DOCUSATE SODIUM SCH MG: 100 CAPSULE, LIQUID FILLED ORAL at 17:19

## 2018-11-19 RX ADMIN — LISINOPRIL SCH MG: 5 TABLET ORAL at 09:06

## 2018-11-19 RX ADMIN — BUSPIRONE HYDROCHLORIDE SCH MG: 10 TABLET ORAL at 09:05

## 2018-11-19 RX ADMIN — BENZTROPINE MESYLATE SCH MG: 1 TABLET ORAL at 09:09

## 2018-11-19 RX ADMIN — OXCARBAZEPINE SCH MG: 150 TABLET, FILM COATED ORAL at 21:48

## 2018-11-19 RX ADMIN — ASPRIN AND EXTENDED-RELEASE DIPYRIDAMOLE SCH CAP.SR: 25; 200 CAPSULE ORAL at 09:09

## 2018-11-19 RX ADMIN — OLANZAPINE SCH MG: 5 TABLET, FILM COATED ORAL at 21:46

## 2018-11-19 RX ADMIN — LANSOPRAZOLE SCH MG: 30 TABLET, ORALLY DISINTEGRATING, DELAYED RELEASE ORAL at 09:04

## 2018-11-19 RX ADMIN — ACETAMINOPHEN PRN MG: 325 TABLET ORAL at 14:58

## 2018-11-19 RX ADMIN — OXCARBAZEPINE SCH MG: 150 TABLET, FILM COATED ORAL at 09:08

## 2018-11-19 RX ADMIN — ASPIRIN SCH MG: 81 TABLET, COATED ORAL at 09:05

## 2018-11-19 RX ADMIN — DOCUSATE SODIUM SCH MG: 100 CAPSULE, LIQUID FILLED ORAL at 09:05

## 2018-11-19 RX ADMIN — ASPRIN AND EXTENDED-RELEASE DIPYRIDAMOLE SCH CAP.SR: 25; 200 CAPSULE ORAL at 17:15

## 2018-11-19 RX ADMIN — BACLOFEN PRN MG: 10 TABLET ORAL at 14:58

## 2018-11-19 RX ADMIN — OXYCODONE HYDROCHLORIDE PRN MG: 5 TABLET ORAL at 09:55

## 2018-11-19 RX ADMIN — TIOTROPIUM BROMIDE SCH CAP: 18 CAPSULE ORAL; RESPIRATORY (INHALATION) at 09:08

## 2018-11-19 RX ADMIN — Medication SCH ML: at 05:06

## 2018-11-19 RX ADMIN — CEFTRIAXONE SODIUM SCH MLS/HR: 2 INJECTION, POWDER, FOR SOLUTION INTRAMUSCULAR; INTRAVENOUS at 17:16

## 2018-11-19 RX ADMIN — METOPROLOL SUCCINATE SCH MG: 25 TABLET, EXTENDED RELEASE ORAL at 09:06

## 2018-11-19 RX ADMIN — INSULIN LISPRO PRN UNIT: 100 INJECTION, SOLUTION INTRAVENOUS; SUBCUTANEOUS at 11:54

## 2018-11-19 NOTE — PDOC PROGRESS REPORT
Subjective


Progress Note for:: 11/19/18


Subjective:: 





Still with significant pain in his right leg.  He had surgical repair of the 

fracture yesterday.


11/17- Better today. Pain Better. Now Constipated.


11/18/2018-still complaining of pain in the right leg.  He has not had a bowel 

movement and is distended.


11/19/2018-sitting up in the chair.  He is feeling better.  He still has pain 

in his right leg but it is not as bad.  He did have a good response to the 

aggressive bowel regimen.


Reason For Visit: 


RIGHT LEG FRACTURE








Physical Exam


Vital Signs: 


 











Temp Pulse Resp BP Pulse Ox


 


 97.3 F   108 H  16   124/64   100 


 


 11/19/18 07:00  11/19/18 07:00  11/19/18 07:00  11/19/18 07:00  11/19/18 07:00








 Intake & Output











 11/18/18 11/19/18 11/20/18





 06:59 06:59 06:59


 


Intake Total 1174 659 


 


Output Total 2725 600 


 


Balance -1551 59 


 


Weight 65.3 kg 64.5 kg 











General appearance: PRESENT: no acute distress, cooperative, obese, well-

developed


Respiratory exam: PRESENT: clear to auscultation david, symmetrical, unlabored.  

ABSENT: rales, rhonchi, tachypnea, wheezes


Cardiovascular exam: PRESENT: RRR, +S1, +S2


GI/Abdominal exam: PRESENT: normal bowel sounds, soft.  ABSENT: distended, 

tenderness


Neurological exam: PRESENT: alert, awake, oriented to person, oriented to place

, oriented to situation, CN II-XII grossly intact


Psychiatric exam: PRESENT: appropriate affect, normal mood.  ABSENT: agitated, 

anxious


Focused psych exam: ABSENT: restlessness


Skin exam: PRESENT: other - Right leg with dressing in place.





Results


Laboratory Results: 


 





 11/19/18 05:28 





 11/19/18 05:28 





 











  11/19/18 11/19/18





  05:28 05:28


 


WBC  9.2 


 


RBC  3.17 L 


 


Hgb  9.1 L 


 


Hct  27.0 L 


 


MCV  85 


 


MCH  28.6 


 


MCHC  33.6 


 


RDW  16.1 H 


 


Plt Count  202 


 


Sodium   134.2 L


 


Potassium   4.8


 


Chloride   96 L


 


Carbon Dioxide   25


 


Anion Gap   13


 


BUN   19


 


Creatinine   1.31 H


 


Est GFR ( Amer)   > 60


 


Est GFR (Non-Af Amer)   58 L


 


Glucose   91


 


Calcium   9.1


 


Magnesium   1.6











Impressions: 


 





Chest X-Ray  11/15/18 00:00


IMPRESSION:  No acute findings


 








Fluoroscopy  11/15/18 00:00


IMPRESSION:  ORIF tibial fracture.  Refer to operative note for further 

information.


 








Pelvis X-Ray  11/15/18 00:00


IMPRESSION:


 


Postsurgical changes with heterotopic bone formation of the


proximal femurs bilaterally. No radiographic evidence for acute


fracture. Correlate with any history of inability to ambulate.


 


 


 2011 [employer]- All Rights Reserved


 








Tibia/Fibula X-Ray  11/15/18 00:00


IMPRESSION:  ORIF tibial fracture.  Refer to operative note for further 

information.


 








Ankle X-Ray  11/15/18 06:35


IMPRESSION:


 


Displaced oblique fractures of the distal fibular and tibial


diaphyses as described above.


 








Head CT  11/15/18 06:35


IMPRESSION:


 


1. There is no evidence of acute intracranial pathology.


2. Stable enlarged ventricles out of proportion to the sulci


which can be seen with normal pressure hydrocephalus.


3. 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


infarcts.


 











Status: Image reviewed by me





Assessment & Plan





- Diagnosis


(1) Displaced fracture of tibia


Qualifiers: 


   Encounter type: initial encounter   Tibia location: distal physis (incl. 

Salter-Prieto)   Laterality: right   Qualified Code(s): S89.101A - Unspecified 

physeal fracture of lower end of right tibia, initial encounter for closed 

fracture   


Is this a current diagnosis for this admission?: Yes   


Plan: 


The patient sustained a fracture of the right tibia and fibula after a fall 

today.  Orthopedic surgery has seen the patient and will be taking him to the 

OR for surgical repair.  From a medical standpoint he is cleared for surgery.  

He does not have a history of myocardial infarction with a negative stress test 

in February.  Dr. Frank will be seeing the patient as well.





11/16/2018-the patient had surgical repair of his right leg yesterday.  He 

still in pain.  Narcotic analgesia is available.


11/17- Leg hurts today but the leg is hanging over the side of the bed. I 

encouraged elevation on a pillow.


11/18/2018-management per orthopedic surgery





11/19/2018-still having some pain.  I will add packs in addition to his pain 

medications.  He will likely need rehab.








(2) Hydrocephalus


Is this a current diagnosis for this admission?: Yes   


Plan: 


The patient has normal pressure hydrocephalus.  He also has spina bifida.  He 

is awaiting neurosurgical consultation for shunt placement and surgical 

intervention for his spinal cord.  Unfortunately the significant gait 

abnormality has caused multiple falls.  He has worked with physical therapy.  

He does have a walker but it was difficult to use in the hotel that they are 

presently staying in.





11/16/2018-currently stable.  Awaiting neurosurgical evaluation for shunt 

placement.


11/17-stable no change


11/18/2018-stable no change.  Continue current regimen.


11/19/2018-no change in treatment plan.








(3) Spina bifida


Qualifiers: 


   Spinal region: unspecified   Presence of hydrocephalus: unspecified 

hydrocephalus presence   Qualified Code(s): Q05.9 - Spina bifida, unspecified   


Is this a current diagnosis for this admission?: Yes   


Plan: 


The patient has a history of spina bifida with a "tethered "spinal cord.  This 

has caused issues with his gait as noted above.  He also has a neurogenic 

bladder.  He straight catheterizes himself regularly.  Postoperatively we will 

leave a Scott catheter in place but look to remove it as soon as possible.





11/16/2018-currently stable.  Awaiting neurosurgical evaluation.


11/17-stable no change continue current regimen


11/18/2018-as above


11/19/2018-continue current plan.








(4) Neurogenic bladder


Is this a current diagnosis for this admission?: Yes   


Plan: 


As noted above the patient straight catheterizes himself regularly.  He does 

report recurrent urinary tract infections.  He is currently on Bactrim for an 

episode of cystitis.  I will continue his Bactrim as an inpatient.  Urinalysis 

is pending.





The patient a Scott catheter placed in the OR.  We will leave it in place.  The 

urine is cloudy.  Culture is pending and preliminarily appears positive.  I 

will resume his Bactrim that he takes as an outpatient.  Once the sensitivities 

are available I can modify his antibiotic therapy appropriately.


11/17- back on Bactrim. Urine Cx still pending.


11/18/2018-cystitis with Klebsiella pneumonia.  Resistant to Bactrim.  The 

patient is on Rocephin.  The Klebsiella is sensitive to Rocephin.


11/19/2018-the patient has a chronic neurogenic bladder.  He self catheterizes.

  He is on suppressive antibiotics of Bactrim.  He developed a Klebsiella 

infection and it is resistant to Bactrim and intermediate to nitrofurantoin.  

The patient will receive a total of 5 doses of ceftriaxone.  The bacteria is 

sensitive and this should cover his infection.  His urologist will have to 

decide on a different suppressive regimen.








(5) Cystitis


Is this a current diagnosis for this admission?: Yes   


Plan: 


11/17- Was on suppresive Abx daily. Resumed bactrim ds BID. Urine culture with 

gram neg bacilli. Final ID and Sensitivities pending.


11/18/2018-discontinue Bactrim.  Continue Rocephin as ordered.  The patient 

will likely need a different suppressive antibiotic.


11/19/2018-culture positive for Klebsiella.  Resistant to Bactrim.  Currently 

on ceftriaxone.








(6) Coronary artery disease with hx of myocardial infarct w/o hx of CABG


Is this a current diagnosis for this admission?: Yes   


Plan: 


Currently stable.  No cardiac symptoms.  Continue medications as ordered.


11/17- No change in medications


11/18/2018-no cardiac symptoms.  Continue current regimen.


11/19/2018-doing well.  Asymptomatic.  Continue current treatment plan.








(7) COPD (chronic obstructive pulmonary disease)


Qualifiers: 


   COPD type: unspecified COPD   Qualified Code(s): J44.9 - Chronic obstructive 

pulmonary disease, unspecified   


Is this a current diagnosis for this admission?: Yes   


Plan: 


The patient has history of chronic obstructive pulmonary disease but is 

currently asymptomatic.  I will continue his home regimen.  Oxygen will be 

available for any hypoxia.  DuoNeb nebulizer therapy will be available if 

needed.





11/16/2018-no dyspnea or wheezing.  Continue current regimen as ordered.





11/17- breathing comfortably. No change in treatment plan.


11/18/2018-patient is stable.  Continue current regimen.


11/19/2018-breathing is comfortable.  There is no respiratory distress.  

Continue current








(8) Diabetes mellitus, insulin dependent (IDDM), uncontrolled


Qualifiers: 


   Glycemic state: with hyperglycemia   Qualified Code(s): E10.65 - Type 1 

diabetes mellitus with hyperglycemia   


Is this a current diagnosis for this admission?: Yes   


Plan: 


I will continue the patient's current insulin regimen.  For tonight he will 

receive a smaller dose of his long-acting insulin because he will be n.p.o. for 

most of the day.  He will be on a Humalog sliding scale as well.  A hemoglobin 

A1c is pending for further clarification of the patient's level of control.





His glucose readings are still high.  His hemoglobin A1c was 10.6.  I have 

increased his Levemir to 35 units daily.


11/17- Increased lantus to 40 units daily. continue sliding scale.





11/18/2018-still with some glucose instability.  We will monitor the pattern 

for another day and probably make adjustments in his Lantus.  He may need twice 

daily dosing.


11/19/2018-glucoses are slightly better.  He does tend to increase as the day 

goes on.  His glucose this morning was in fact normal.  May need to add a low-

dose of Lantus in the morning.  We will continue on his current dose for today.








(9) Hypothyroidism


Qualifiers: 


   Hypothyroidism type: unspecified   Qualified Code(s): E03.9 - Hypothyroidism

, unspecified   


Is this a current diagnosis for this admission?: Yes   


Plan: 


We will continue his current dose of levothyroxine.  He takes 25 mcg daily.








(10) Bipolar 1 disorder


Is this a current diagnosis for this admission?: Yes   


Plan: 


Patient is on multiple medications.  We will continue his current regimen.  

Currently he appears asymptomatic.  I will obtain more detailed information on 

the history of his mental illness.





11/16/2018-the patient is slightly agitated but I believe this is from his 

pain.  Continue current medications as ordered.


11/17/18- Calm today. does respond to pain appropriately. In good spirits.


11/18/2018-seems uncomfortable today.  This is likely due to constipation.  No 

change in current regimen.








(11) Constipation


Qualifiers: 


   Constipation type: drug induced constipation   Qualified Code(s): K59.03 - 

Drug induced constipation   


Is this a current diagnosis for this admission?: Yes   


Plan: 


11/17- Likely due to pain meds. Will give Miralax now and make available PRN. 

Start colace 100 bid as well.


11/18/2018-still with no bowel movement.  Distended and tympanitic.  I will 

administer a bottle of magnesium citrate and a more aggressive bowel regimen.


11/19/2018-he did have a good response to aggressive regimen yesterday.  We 

will continue stool softeners to try and avoid opiate-induced constipation.








- Time


Time Spent with patient: 15-24 minutes

## 2018-11-20 RX ADMIN — CEFTRIAXONE SODIUM SCH MLS/HR: 2 INJECTION, POWDER, FOR SOLUTION INTRAMUSCULAR; INTRAVENOUS at 17:16

## 2018-11-20 RX ADMIN — Medication SCH: at 13:33

## 2018-11-20 RX ADMIN — LEVOTHYROXINE SODIUM SCH MG: 25 TABLET ORAL at 05:16

## 2018-11-20 RX ADMIN — OXYCODONE HYDROCHLORIDE PRN MG: 5 TABLET ORAL at 21:48

## 2018-11-20 RX ADMIN — Medication SCH: at 05:15

## 2018-11-20 RX ADMIN — ASPRIN AND EXTENDED-RELEASE DIPYRIDAMOLE SCH: 25; 200 CAPSULE ORAL at 14:23

## 2018-11-20 RX ADMIN — TAMSULOSIN HYDROCHLORIDE SCH MG: 0.4 CAPSULE ORAL at 17:24

## 2018-11-20 RX ADMIN — METOPROLOL SUCCINATE SCH: 25 TABLET, EXTENDED RELEASE ORAL at 14:24

## 2018-11-20 RX ADMIN — SIMVASTATIN SCH MG: 40 TABLET, FILM COATED ORAL at 21:49

## 2018-11-20 RX ADMIN — TIOTROPIUM BROMIDE SCH: 18 CAPSULE ORAL; RESPIRATORY (INHALATION) at 14:24

## 2018-11-20 RX ADMIN — FLUOXETINE SCH: 20 CAPSULE ORAL at 14:24

## 2018-11-20 RX ADMIN — ASPIRIN SCH: 81 TABLET, COATED ORAL at 14:23

## 2018-11-20 RX ADMIN — BENZTROPINE MESYLATE SCH: 1 TABLET ORAL at 14:23

## 2018-11-20 RX ADMIN — BUSPIRONE HYDROCHLORIDE SCH: 10 TABLET ORAL at 14:23

## 2018-11-20 RX ADMIN — BUSPIRONE HYDROCHLORIDE SCH MG: 10 TABLET ORAL at 21:49

## 2018-11-20 RX ADMIN — SODIUM CHLORIDE, SODIUM LACTATE, POTASSIUM CHLORIDE, AND CALCIUM CHLORIDE PRN MLS/HR: .6; .31; .03; .02 INJECTION, SOLUTION INTRAVENOUS at 17:49

## 2018-11-20 RX ADMIN — DOCUSATE SODIUM SCH: 100 CAPSULE, LIQUID FILLED ORAL at 17:28

## 2018-11-20 RX ADMIN — DOCUSATE SODIUM SCH: 100 CAPSULE, LIQUID FILLED ORAL at 14:23

## 2018-11-20 RX ADMIN — BENZTROPINE MESYLATE SCH MG: 1 TABLET ORAL at 17:24

## 2018-11-20 RX ADMIN — POTASSIUM CHLORIDE SCH: 750 TABLET, FILM COATED, EXTENDED RELEASE ORAL at 14:23

## 2018-11-20 RX ADMIN — OXCARBAZEPINE SCH: 150 TABLET, FILM COATED ORAL at 14:24

## 2018-11-20 RX ADMIN — INSULIN GLARGINE SCH: 100 INJECTION, SOLUTION SUBCUTANEOUS at 21:49

## 2018-11-20 RX ADMIN — SODIUM CHLORIDE, SODIUM LACTATE, POTASSIUM CHLORIDE, AND CALCIUM CHLORIDE PRN MLS/HR: .6; .31; .03; .02 INJECTION, SOLUTION INTRAVENOUS at 12:33

## 2018-11-20 RX ADMIN — LISINOPRIL SCH: 5 TABLET ORAL at 14:23

## 2018-11-20 RX ADMIN — OLANZAPINE SCH: 5 TABLET, FILM COATED ORAL at 14:23

## 2018-11-20 RX ADMIN — SODIUM CHLORIDE, SODIUM LACTATE, POTASSIUM CHLORIDE, AND CALCIUM CHLORIDE PRN MLS/HR: .6; .31; .03; .02 INJECTION, SOLUTION INTRAVENOUS at 01:09

## 2018-11-20 RX ADMIN — ASPRIN AND EXTENDED-RELEASE DIPYRIDAMOLE SCH CAP.SR: 25; 200 CAPSULE ORAL at 17:24

## 2018-11-20 RX ADMIN — Medication SCH: at 21:50

## 2018-11-20 RX ADMIN — LANSOPRAZOLE SCH: 30 TABLET, ORALLY DISINTEGRATING, DELAYED RELEASE ORAL at 14:22

## 2018-11-20 NOTE — PDOC PROGRESS REPORT
Subjective


Progress Note for:: 11/20/18


Reason For Visit: 


RIGHT LEG FRACTURE


48-year-old white male status post open reduction internal fixation of her 

right distal third tib-fib fracture approximately 5 days ago.





Physical Exam


Vital Signs: 


 











Temp Pulse Resp BP Pulse Ox


 


 36.3 C   110 H  16   145/93 H  100 


 


 11/20/18 03:21  11/20/18 03:21  11/20/18 03:21  11/20/18 03:21  11/20/18 00:00








 Intake & Output











 11/19/18 11/20/18 11/21/18





 06:59 06:59 06:59


 


Intake Total 659 996 


 


Output Total 600 600 


 


Balance 59 396 


 


Weight 64.5 kg 64.5 kg 











Physical Exam: 





Patient seemingly incoherent lying in bed.  Relatively non-cooperative with 

exam today.


General appearance: PRESENT: other - ?


Head exam: PRESENT: normocephalic


Respiratory exam: PRESENT: wheezes


Cardiovascular exam: PRESENT: tachycardia


Vascular exam: PRESENT: normal capillary refill


GI/Abdominal exam: PRESENT: soft


Rectal exam: PRESENT: deferred


Extremities exam: PRESENT: other - Compressive dressing removed from the right 

lower extremity.  Incisions are well approximated with staples and are clean 

and dry.  Extensive ecchymosis down the distal half of the lower extremity.


Psychiatric exam: PRESENT: agitated





Results


Laboratory Results: 


 





 11/19/18 05:28 





 11/19/18 05:28 








Impressions: 


 





Chest X-Ray  11/15/18 00:00


IMPRESSION:  No acute findings


 








Fluoroscopy  11/15/18 00:00


IMPRESSION:  ORIF tibial fracture.  Refer to operative note for further 

information.


 








Pelvis X-Ray  11/15/18 00:00


IMPRESSION:


 


Postsurgical changes with heterotopic bone formation of the


proximal femurs bilaterally. No radiographic evidence for acute


fracture. Correlate with any history of inability to ambulate.


 


 


 2011 [employer]- All Rights Reserved


 








Tibia/Fibula X-Ray  11/15/18 00:00


IMPRESSION:  ORIF tibial fracture.  Refer to operative note for further 

information.


 








Ankle X-Ray  11/15/18 06:35


IMPRESSION:


 


Displaced oblique fractures of the distal fibular and tibial


diaphyses as described above.


 








Head CT  11/15/18 06:35


IMPRESSION:


 


1. There is no evidence of acute intracranial pathology.


2. Stable enlarged ventricles out of proportion to the sulci


which can be seen with normal pressure hydrocephalus.


3. 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


infarcts.


 











Status: Imported from PACS





Assessment & Plan





- Diagnosis


(1) Fracture of distal end of tibia


Qualifiers: 


   Encounter type: initial encounter   Fracture type: closed   Fracture 

morphology: other fracture   Laterality: right   Qualified Code(s): S82.391A - 

Other fracture of lower end of right tibia, initial encounter for closed 

fracture   


Is this a current diagnosis for this admission?: Yes   


Plan: 


Status post ORIF of the right distal third tib-fib fracture with no evidence of 

postoperative surgical complications.

## 2018-11-20 NOTE — PDOC PROGRESS REPORT
Subjective


Progress Note for:: 11/20/18


Subjective:: 





This 48-year-old female admitted on 11 6/15/2018 after a fall and fracture of 

the shaft of the right tibia status post ORIF done.  Nurse on duty called me 

concerned about the patient as per her patient is  but less responsive, 

lethargic.  I went to see the patient immediately patient is comfortably in the 

bed in sleeping posture responded to the name able to tell me the hospital he 

was then able to tell me his date of birth and responding to the pain stimuli.  

Patient denies any pain.


Reason For Visit: 


RIGHT LEG FRACTURE








Physical Exam


Vital Signs: 


 











Temp Pulse Resp BP Pulse Ox


 


 98.7 F   104 H  19   133/84 H  97 


 


 11/20/18 12:03  11/20/18 12:03  11/20/18 12:03  11/20/18 12:03  11/20/18 12:03








 Intake & Output











 11/19/18 11/20/18 11/21/18





 06:59 06:59 06:59


 


Intake Total 659 996 892


 


Output Total 600 600 


 


Balance 59 396 892


 


Weight 64.5 kg 64.5 kg 











General appearance: PRESENT: no acute distress


Head exam: PRESENT: atraumatic


Eye exam: PRESENT: other - Eye examination pupils are dilated and reactive


Neck exam: ABSENT: carotid bruit, JVD, lymphadenopathy, thyromegaly


Respiratory exam: PRESENT: clear to auscultation david, tachypnea


Cardiovascular exam: PRESENT: tachycardia, other - Tachycardia with heart rate 

of 100


Pulses: PRESENT: normal dorsalis pedis pul


GI/Abdominal exam: PRESENT: normal bowel sounds, soft.  ABSENT: distended, 

guarding, mass, organolmegaly, rebound, tenderness


Neurological exam: PRESENT: other - I followed the patient and he evaluated him 

to 3 times over a period of 2 3 hours because of the concerns from the nursing 

staff and patient got Haldol yesterday for his agitation may be because of the 

sedative effect patient is sleepy all day did not take his breakfast did not 

eat his lunch and not taking any p.o. medications on examination pupils are 

dilated and reactive patient is breathing comfortably and responding to his 

name and able to give the date of birth and able to tell me where he is and I 

examined the chest I requested him to take a deep breath able to take deep 

breaths he is moving all all 4 limbs without any difficulty may be is 

diminished mental status may be secondary to medication sedatives especially 

like Haldol.  I am going to continue to reevaluate the patient on hourly basis.


Psychiatric exam: PRESENT: other - Patient was lethargic and less responsive 

today may be secondary to medications he was resuscitated and combative 

yesterday he was started on Haldol as needed and frequent to continue to 

monitor his mental status on regular basis thank you


Skin exam: PRESENT: dry, intact, warm.  ABSENT: cyanosis, rash





Results


Laboratory Results: 


 





 11/19/18 05:28 





 11/19/18 05:28 








Impressions: 


 





Chest X-Ray  11/15/18 00:00


IMPRESSION:  No acute findings


 








Fluoroscopy  11/15/18 00:00


IMPRESSION:  ORIF tibial fracture.  Refer to operative note for further 

information.


 








Pelvis X-Ray  11/15/18 00:00


IMPRESSION:


 


Postsurgical changes with heterotopic bone formation of the


proximal femurs bilaterally. No radiographic evidence for acute


fracture. Correlate with any history of inability to ambulate.


 


 


 2011 [employer]- All Rights Reserved


 








Tibia/Fibula X-Ray  11/15/18 00:00


IMPRESSION:  ORIF tibial fracture.  Refer to operative note for further 

information.


 








Ankle X-Ray  11/15/18 06:35


IMPRESSION:


 


Displaced oblique fractures of the distal fibular and tibial


diaphyses as described above.


 








Head CT  11/15/18 06:35


IMPRESSION:


 


1. There is no evidence of acute intracranial pathology.


2. Stable enlarged ventricles out of proportion to the sulci


which can be seen with normal pressure hydrocephalus.


3. 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


infarcts.


 














Assessment & Plan





- Diagnosis


(1) Displaced fracture of tibia


Qualifiers: 


   Encounter type: initial encounter   Tibia location: distal physis (incl. 

Salter-Prieto)   Laterality: right   Qualified Code(s): S89.101A - Unspecified 

physeal fracture of lower end of right tibia, initial encounter for closed 

fracture   


Is this a current diagnosis for this admission?: Yes   


Plan: 


11/20/2018 no complaints of pain today patient has right tibial fracture status 

post ORIF.  May likely need to go to a rehab.








(2) Altered mental status


Qualifiers: 


   Altered mental status type: unspecified   Qualified Code(s): R41.82 - 

Altered mental status, unspecified   


Is this a current diagnosis for this admission?: Yes   


Plan: 


11/20/2018-patient called me concerned about the patient's altered mental 

status and he was less responsive lethargic and not responding to verbal 

commands as per the patient nurse event able to be evaluated the patient to me 

he responded okay and he is not in distress vital signs are stable able to move 

all his 4 extremities and his breathing is okay and may be his altered mental 

status is secondary to the Haldol he received yesterday and right now the plan 

is to continue to have a close monitoring of his mental status.








(3) Diabetes mellitus, insulin dependent (IDDM), uncontrolled


Qualifiers: 


   Glycemic state: with hyperglycemia   Qualified Code(s): E10.65 - Type 1 

diabetes mellitus with hyperglycemia   


Is this a current diagnosis for this admission?: Yes   


Plan: 


1/20/2018-patient has chronic history of diabetes and he is on Lantus his blood 

sugar is 175 this morning. he is also on  Sliding scale.








(4) Bipolar 1 disorder


Is this a current diagnosis for this admission?: Yes   


Plan: 


11/20/2018t-patient has history of bipolar disorder he is on multiple 

medications.  He is currently asymptomatic.








(5) Cystitis


Is this a current diagnosis for this admission?: Yes   


Plan: 


1/20/2018-urine culture came back positive for Klebsiella.  He is on 

ceftriaxone.








(6) Coronary artery disease with hx of myocardial infarct w/o hx of CABG


Is this a current diagnosis for this admission?: Yes   


Plan: 


11/20/2018 no complaints of chest pain doing well.  We will continue with the 

current treatment








(7) Spina bifida


Qualifiers: 


   Spinal region: unspecified   Presence of hydrocephalus: unspecified 

hydrocephalus presence   Qualified Code(s): Q05.9 - Spina bifida, unspecified   


Is this a current diagnosis for this admission?: Yes   


Plan: 


11/20/2018  continue the current plan








(8) Neurogenic bladder


Is this a current diagnosis for this admission?: Yes   


Plan: 


11/20/2018-patient does self-catheterization at home he is currently on Bactrim 

at home for cystitis.  Culture had come back as Klebsiella since to be due to 

ceftriaxone.  He stopped his Bactrim while he was in the hospital.  He still 

have the Scott's catheter.








- Time


Time Spent with patient: 25-34 minutes


Medications reviewed and adjusted accordingly: Yes

## 2018-11-21 LAB
ADD MANUAL DIFF: NO
ALBUMIN SERPL-MCNC: 3.3 G/DL (ref 3.5–5)
ALP SERPL-CCNC: 93 U/L (ref 38–126)
ALT SERPL-CCNC: 20 U/L (ref 21–72)
ANION GAP SERPL CALC-SCNC: 13 MMOL/L (ref 5–19)
AST SERPL-CCNC: 42 U/L (ref 17–59)
BASOPHILS # BLD AUTO: 0 10^3/UL (ref 0–0.2)
BASOPHILS NFR BLD AUTO: 0.6 % (ref 0–2)
BILIRUB DIRECT SERPL-MCNC: 0.4 MG/DL (ref 0–0.4)
BILIRUB SERPL-MCNC: 0.5 MG/DL (ref 0.2–1.3)
BUN SERPL-MCNC: 15 MG/DL (ref 7–20)
CALCIUM: 9.1 MG/DL (ref 8.4–10.2)
CHLORIDE SERPL-SCNC: 96 MMOL/L (ref 98–107)
CO2 SERPL-SCNC: 25 MMOL/L (ref 22–30)
EOSINOPHIL # BLD AUTO: 0.3 10^3/UL (ref 0–0.6)
EOSINOPHIL NFR BLD AUTO: 3.5 % (ref 0–6)
ERYTHROCYTE [DISTWIDTH] IN BLOOD BY AUTOMATED COUNT: 15.7 % (ref 11.5–14)
GLUCOSE SERPL-MCNC: 235 MG/DL (ref 75–110)
HCT VFR BLD CALC: 26.7 % (ref 37.9–51)
HGB BLD-MCNC: 9.1 G/DL (ref 13.5–17)
LYMPHOCYTES # BLD AUTO: 1.1 10^3/UL (ref 0.5–4.7)
LYMPHOCYTES NFR BLD AUTO: 14.9 % (ref 13–45)
MCH RBC QN AUTO: 29 PG (ref 27–33.4)
MCHC RBC AUTO-ENTMCNC: 33.9 G/DL (ref 32–36)
MCV RBC AUTO: 86 FL (ref 80–97)
MONOCYTES # BLD AUTO: 0.6 10^3/UL (ref 0.1–1.4)
MONOCYTES NFR BLD AUTO: 8.4 % (ref 3–13)
NEUTROPHILS # BLD AUTO: 5.5 10^3/UL (ref 1.7–8.2)
NEUTS SEG NFR BLD AUTO: 72.6 % (ref 42–78)
PLATELET # BLD: 279 10^3/UL (ref 150–450)
POTASSIUM SERPL-SCNC: 4.6 MMOL/L (ref 3.6–5)
PROT SERPL-MCNC: 6.2 G/DL (ref 6.3–8.2)
RBC # BLD AUTO: 3.12 10^6/UL (ref 4.35–5.55)
SODIUM SERPL-SCNC: 133.8 MMOL/L (ref 137–145)
TOTAL CELLS COUNTED % (AUTO): 100 %
WBC # BLD AUTO: 7.5 10^3/UL (ref 4–10.5)

## 2018-11-21 RX ADMIN — Medication SCH: at 22:26

## 2018-11-21 RX ADMIN — OXYCODONE HYDROCHLORIDE PRN MG: 5 TABLET ORAL at 06:11

## 2018-11-21 RX ADMIN — ACETAMINOPHEN PRN MG: 325 TABLET ORAL at 17:49

## 2018-11-21 RX ADMIN — DIVALPROEX SODIUM SCH MG: 250 TABLET, FILM COATED, EXTENDED RELEASE ORAL at 09:31

## 2018-11-21 RX ADMIN — SIMVASTATIN SCH MG: 40 TABLET, FILM COATED ORAL at 22:30

## 2018-11-21 RX ADMIN — BUSPIRONE HYDROCHLORIDE SCH MG: 10 TABLET ORAL at 09:32

## 2018-11-21 RX ADMIN — INSULIN LISPRO PRN UNIT: 100 INJECTION, SOLUTION INTRAVENOUS; SUBCUTANEOUS at 00:07

## 2018-11-21 RX ADMIN — SODIUM CHLORIDE, SODIUM LACTATE, POTASSIUM CHLORIDE, AND CALCIUM CHLORIDE PRN MLS/HR: .6; .31; .03; .02 INJECTION, SOLUTION INTRAVENOUS at 06:13

## 2018-11-21 RX ADMIN — POTASSIUM CHLORIDE SCH MEQ: 750 TABLET, FILM COATED, EXTENDED RELEASE ORAL at 09:31

## 2018-11-21 RX ADMIN — TIOTROPIUM BROMIDE SCH CAP: 18 CAPSULE ORAL; RESPIRATORY (INHALATION) at 09:35

## 2018-11-21 RX ADMIN — INSULIN LISPRO PRN UNIT: 100 INJECTION, SOLUTION INTRAVENOUS; SUBCUTANEOUS at 17:39

## 2018-11-21 RX ADMIN — BACLOFEN PRN MG: 10 TABLET ORAL at 11:39

## 2018-11-21 RX ADMIN — LISINOPRIL SCH MG: 5 TABLET ORAL at 09:32

## 2018-11-21 RX ADMIN — LANSOPRAZOLE SCH: 30 TABLET, ORALLY DISINTEGRATING, DELAYED RELEASE ORAL at 09:31

## 2018-11-21 RX ADMIN — INSULIN GLARGINE SCH: 100 INJECTION, SOLUTION SUBCUTANEOUS at 22:31

## 2018-11-21 RX ADMIN — BENZTROPINE MESYLATE SCH MG: 1 TABLET ORAL at 17:38

## 2018-11-21 RX ADMIN — ASPRIN AND EXTENDED-RELEASE DIPYRIDAMOLE SCH CAP.SR: 25; 200 CAPSULE ORAL at 09:35

## 2018-11-21 RX ADMIN — ASPRIN AND EXTENDED-RELEASE DIPYRIDAMOLE SCH CAP.SR: 25; 200 CAPSULE ORAL at 17:38

## 2018-11-21 RX ADMIN — LEVOTHYROXINE SODIUM SCH MG: 25 TABLET ORAL at 06:11

## 2018-11-21 RX ADMIN — INSULIN LISPRO PRN UNIT: 100 INJECTION, SOLUTION INTRAVENOUS; SUBCUTANEOUS at 13:04

## 2018-11-21 RX ADMIN — Medication SCH: at 13:08

## 2018-11-21 RX ADMIN — Medication SCH: at 06:07

## 2018-11-21 RX ADMIN — DOCUSATE SODIUM SCH MG: 100 CAPSULE, LIQUID FILLED ORAL at 09:32

## 2018-11-21 RX ADMIN — METOPROLOL SUCCINATE SCH MG: 25 TABLET, EXTENDED RELEASE ORAL at 09:32

## 2018-11-21 RX ADMIN — ASPIRIN SCH MG: 81 TABLET, COATED ORAL at 09:31

## 2018-11-21 RX ADMIN — BENZTROPINE MESYLATE SCH MG: 1 TABLET ORAL at 09:34

## 2018-11-21 RX ADMIN — SODIUM CHLORIDE, SODIUM LACTATE, POTASSIUM CHLORIDE, AND CALCIUM CHLORIDE PRN MLS/HR: .6; .31; .03; .02 INJECTION, SOLUTION INTRAVENOUS at 17:42

## 2018-11-21 RX ADMIN — OXYCODONE HYDROCHLORIDE PRN MG: 5 TABLET ORAL at 22:29

## 2018-11-21 RX ADMIN — DOCUSATE SODIUM SCH: 100 CAPSULE, LIQUID FILLED ORAL at 17:44

## 2018-11-21 RX ADMIN — OXYCODONE HYDROCHLORIDE PRN MG: 5 TABLET ORAL at 15:23

## 2018-11-21 RX ADMIN — LEVOFLOXACIN SCH MG: 500 TABLET, FILM COATED ORAL at 11:39

## 2018-11-21 RX ADMIN — FLUOXETINE SCH MG: 20 CAPSULE ORAL at 09:34

## 2018-11-21 RX ADMIN — TAMSULOSIN HYDROCHLORIDE SCH MG: 0.4 CAPSULE ORAL at 17:38

## 2018-11-21 RX ADMIN — ACETAMINOPHEN PRN MG: 325 TABLET ORAL at 00:08

## 2018-11-21 RX ADMIN — BUSPIRONE HYDROCHLORIDE SCH MG: 10 TABLET ORAL at 22:30

## 2018-11-21 NOTE — PSYCHOLOGICAL NOTE
Psych Note





- Psych Note


Date seen by psych provider: 11/21/18


Time seen by psych provider: 10:00


Psych Note: 


Reason for Consult: psychosis





Patient reports that his leg feels a little funny today however otherwise feels 

fine.  He states that he is fairly certain that he received Haldol because when 

he gets the Haldol it seems to knock him out for very long time.  He states he 

has a payee for his money and has a diagnosis of bipolar.  He denies any 

thoughts of wanting to harm himself or others.  Patient states if the clincian 

would like to call his payee, "she knows all about what medicine and doctors I 

have...her number is [phone]."





Patient is alert and orientated to person place time and circumstance.  Mood is 

euthymic with congruent affect. patient denies suicidal and homicidal ideation. 

Delusions are absent and behaviour is congruent with an intact reality based 

Presentation ie organized and linear thought processes.  eye contact was well 

maintained.  Conversational speech as verbose; however normal rate, tone and 

prosody.  Intellectual ability appear to be below average range. Attention and 

concentration are fair.  Insight, judgment and impulse control is fair.


 


Head CT  11/15/18 06:35


IMPRESSION:Stable enlarged ventricles out of proportion to the sulci which can 

be seen with normal pressure hydrocephalus. Chronic microangiopathy and old 

left basal ganglia lacunar infarcts.


 


Medication recommendations per [facility]'s contracted psychiatrist Dr. Eduardo ECHAVARRIA 

are as follows:


Reduce home medication of Prozac to 40mg daily


Discontinue Ativan, Haldol,Trileptal,and zyprexa


Depakote 250mg twice daily


Buspar 10mg twice daily





799.59 (R41.9) Unspecified Neurocognitive Disorder, per history


296.80 (F31.9) Unspecified Bipolar Disorder, per history provided by patient





Impression/ Plan: Patient is cleared from acute psychiatric services.  Patient 

does not meet IVC criteria per [place] GS 122C.  Patient is not demonstrating any 

behaviours indicating he is responding to internal stimuli (ie organized and 

linear conversation, good eye contact). HE denies thoughts of wanting to harm 

himself or others.  Patient is recommended to follow up with neurology as 

multiple head CT's over the past few years suggest enlarged ventricles, and 

scattered white matter. Prescribing physicians are asked to consider avoiding 

benzodiazepines and antipsychotics as these can increase symptoms of aggression

, confusion, and psychosis with patients that have neurodegenerative diagnoses 

such as dementia and TBI. Dr. Serrato was consulted on the care and management 

of this patient; attending physician is in agreement with recommendations and 

disposition.

## 2018-11-21 NOTE — PSYCHOLOGICAL NOTE
Psych Note





- Psych Note


Psych Note: 


Reason for Consult: psychosis





Chart review conducted because of patient flow in ED





Head CT  11/15/18 06:35


IMPRESSION:Stable enlarged ventricles out of proportion to the sulci which can 

be seen with normal pressure hydrocephalus. Chronic microangiopathy and old 

left basal ganglia lacunar infarcts.


 


Medication recommendations per [facility]'s contracted psychiatrist Dr. Eduardo ECHAVARRIA 

are as follows:


Reduce homemedication of Prozac to 40mg daily


Discontinue Ativan, Haldol,Trileptal,and zyprexa


Depakote 250mg twice daily


Buspar 10mg twice daily





799.59 (R41.9) Unspecified Neurocognitive Disorder, per history


296.80 (F31.9) Unspecified Bipolar Disorder, per history provided by patient





Patient is recommended to follow up with neurology as multiple head CT's over 

the past few years suggest enlarged ventricles, and scattered white matter. 

Prescribing physicians are asked to consider avoiding benzodiazepines and 

antipsychotics as these can increase symptoms of aggression, confusion, and 

psychosis with patients that have neurodegenerative diagnoses such as dementia 

and TBI. Patient will be evaluated tomorrow by clinician.

## 2018-11-21 NOTE — PDOC PROGRESS REPORT
Subjective


Progress Note for:: 11/21/18


Subjective:: 





21 2018-patient was alert and oriented communicating well.  Denies any pain.  

Still complaining of right lower leg swelling.


Reason For Visit: 


RIGHT LEG FRACTURE








Physical Exam


Vital Signs: 


 











Temp Pulse Resp BP Pulse Ox


 


 98.1 F   85   18   138/70 H  99 


 


 11/21/18 06:45  11/21/18 06:45  11/21/18 06:45  11/21/18 06:45  11/21/18 06:45








 Intake & Output











 11/20/18 11/21/18 11/22/18





 06:59 06:59 06:59


 


Intake Total 996 3048 


 


Output Total 600 2300 


 


Balance 396 748 


 


Weight 64.5 kg 66.1 kg 











General appearance: PRESENT: no acute distress


Head exam: PRESENT: atraumatic, normocephalic


Eye exam: PRESENT: PERRLA


Neck exam: ABSENT: carotid bruit, JVD, lymphadenopathy, thyromegaly


Respiratory exam: PRESENT: clear to auscultation david.  ABSENT: rales, rhonchi, 

wheezes


Cardiovascular exam: PRESENT: RRR.  ABSENT: diastolic murmur, rubs, systolic 

murmur


GI/Abdominal exam: PRESENT: normal bowel sounds, soft.  ABSENT: distended, 

guarding, mass, organolmegaly, rebound, tenderness


Neurological exam: PRESENT: alert, awake, oriented to person, oriented to place

, oriented to time, oriented to situation, CN II-XII grossly intact.  ABSENT: 

motor sensory deficit





Results


Laboratory Results: 


 





 11/19/18 05:28 





 11/19/18 05:28 








Impressions: 


 





Chest X-Ray  11/15/18 00:00


IMPRESSION:  No acute findings


 








Fluoroscopy  11/15/18 00:00


IMPRESSION:  ORIF tibial fracture.  Refer to operative note for further 

information.


 








Pelvis X-Ray  11/15/18 00:00


IMPRESSION:


 


Postsurgical changes with heterotopic bone formation of the


proximal femurs bilaterally. No radiographic evidence for acute


fracture. Correlate with any history of inability to ambulate.


 


 


 2011 [employer]- All Rights Reserved


 








Tibia/Fibula X-Ray  11/15/18 00:00


IMPRESSION:  ORIF tibial fracture.  Refer to operative note for further 

information.


 








Ankle X-Ray  11/15/18 06:35


IMPRESSION:


 


Displaced oblique fractures of the distal fibular and tibial


diaphyses as described above.


 








Head CT  11/15/18 06:35


IMPRESSION:


 


1. There is no evidence of acute intracranial pathology.


2. Stable enlarged ventricles out of proportion to the sulci


which can be seen with normal pressure hydrocephalus.


3. 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


infarcts.


 














Assessment & Plan





- Diagnosis


(1) Displaced fracture of tibia


Qualifiers: 


   Encounter type: initial encounter   Tibia location: distal physis (incl. 

Salter-Prieto)   Laterality: right   Qualified Code(s): S89.101A - Unspecified 

physeal fracture of lower end of right tibia, initial encounter for closed 

fracture   


Is this a current diagnosis for this admission?: Yes   


Plan: 


11/20/2018 no complaints of pain today patient has right tibial fracture status 

post ORIF.  May likely need to go to a rehab.





11/21/2018 patient has right tibial fracture status post ORIF.  Patient states 

he is barely able to walk because of pain in the right lower leg we are waiting 

for the nursing home placement.








(2) Altered mental status


Qualifiers: 


   Altered mental status type: unspecified   Qualified Code(s): R41.82 - 

Altered mental status, unspecified   


Is this a current diagnosis for this admission?: Yes   


Plan: 


11/20/2018-patient called me concerned about the patient's altered mental 

status and he was less responsive lethargic and not responding to verbal 

commands as per the patient nurse event able to be evaluated the patient to me 

he responded okay and he is not in distress vital signs are stable able to move 

all his 4 extremities and his breathing is okay and may be his altered mental 

status is secondary to the Haldol he received yesterday and right now the plan 

is to continue to have a close monitoring of his mental status.





11/21/2018-she is much more alert and oriented today, the psych team called me 

yesterday the recommended several changes in the medications like discontinuing 

Haldol, discontinuing clonazepam, discontinuing Trileptal, and decreasing the 

dose of Prozac to 40 mg daily.  They also recommended to start him on BuSpar 10 

mg p.o. daily also on Depakote 250 mg p.o. twice daily.  The psych team got in 

touch with me this morning they cleared him from their point of view.  Patient'

s altered mental status was resolved but the nurses are telling me patient has 

mood swings and his behavior sometimes is difficult to control, so we are 

planning to continue to keep the sitter to watch him closely








(3) Diabetes mellitus, insulin dependent (IDDM), uncontrolled


Qualifiers: 


   Glycemic state: with hyperglycemia   Qualified Code(s): E10.65 - Type 1 

diabetes mellitus with hyperglycemia   


Is this a current diagnosis for this admission?: Yes   


Plan: 


1/20/2018-patient has chronic history of diabetes and he is on Lantus his blood 

sugar is 175 this morning. he is also on  Sliding scale.





11/21/2018-she is latest blood sugar is 84.  Patient is on Lantus 40 units at 

bedtime, and he is also on insulin sliding scale, we are planning to continue 

the present regimen.








(4) Bipolar 1 disorder


Is this a current diagnosis for this admission?: Yes   


Plan: 


11/20/2018t-patient has history of bipolar disorder he is on multiple 

medications.  He is currently asymptomatic.





1/21/2018 she is currently asymptomatic and we implemented the psychiatric 

recommendations.








(5) Cystitis


Is this a current diagnosis for this admission?: Yes   


Plan: 


1/20/2018-urine culture came back positive for Klebsiella.  He is on 

ceftriaxone.





1/21/2018 cultures came back positive for Klebsiella he got 1 dose of 

ceftriaxone on 11/16/2018.  Going to start him on Levaquin 400 mg p.o. daily.








(6) Coronary artery disease with hx of myocardial infarct w/o hx of CABG


Is this a current diagnosis for this admission?: Yes   


Plan: 


11/20/2018 no complaints of chest pain doing well.  We will continue with the 

current treatment





1/21/2018 and is asymptomatic, denies any chest pains,








(7) Spina bifida


Qualifiers: 


   Spinal region: unspecified   Presence of hydrocephalus: unspecified 

hydrocephalus presence   Qualified Code(s): Q05.9 - Spina bifida, unspecified   


Is this a current diagnosis for this admission?: Yes   


Plan: 


11/20/2018  continue the current plan








11/21/2018-has history of spina bifida, will continue to consider current 

management.








(8) Neurogenic bladder


Is this a current diagnosis for this admission?: Yes   


Plan: 


11/20/2018-patient does self-catheterization at home he is currently on Bactrim 

at home for cystitis.  Culture had come back as Klebsiella since to be due to 

ceftriaxone.  He stopped his Bactrim while he was in the hospital.  He still 

have the Scott's catheter.





11/21/2018-patient does not have any Scott's catheter at this moment and is 

able to void urine no concerns reported by the nursing team.








- Time


Time Spent with patient: 15-24 minutes


Anticipated discharge: SNF

## 2018-11-22 LAB
ADD MANUAL DIFF: NO
ALBUMIN SERPL-MCNC: 3.6 G/DL (ref 3.5–5)
ALP SERPL-CCNC: 94 U/L (ref 38–126)
ALT SERPL-CCNC: 20 U/L (ref 21–72)
ANION GAP SERPL CALC-SCNC: 16 MMOL/L (ref 5–19)
AST SERPL-CCNC: 26 U/L (ref 17–59)
BASOPHILS # BLD AUTO: 0.1 10^3/UL (ref 0–0.2)
BASOPHILS NFR BLD AUTO: 1.4 % (ref 0–2)
BILIRUB DIRECT SERPL-MCNC: 0.3 MG/DL (ref 0–0.4)
BILIRUB SERPL-MCNC: 0.4 MG/DL (ref 0.2–1.3)
BUN SERPL-MCNC: 11 MG/DL (ref 7–20)
CALCIUM: 9.6 MG/DL (ref 8.4–10.2)
CHLORIDE SERPL-SCNC: 97 MMOL/L (ref 98–107)
CO2 SERPL-SCNC: 25 MMOL/L (ref 22–30)
EOSINOPHIL # BLD AUTO: 0.2 10^3/UL (ref 0–0.6)
EOSINOPHIL NFR BLD AUTO: 2.9 % (ref 0–6)
ERYTHROCYTE [DISTWIDTH] IN BLOOD BY AUTOMATED COUNT: 15.6 % (ref 11.5–14)
GLUCOSE SERPL-MCNC: 126 MG/DL (ref 75–110)
HCT VFR BLD CALC: 29.3 % (ref 37.9–51)
HGB BLD-MCNC: 9.8 G/DL (ref 13.5–17)
LYMPHOCYTES # BLD AUTO: 1.2 10^3/UL (ref 0.5–4.7)
LYMPHOCYTES NFR BLD AUTO: 14 % (ref 13–45)
MCH RBC QN AUTO: 28.4 PG (ref 27–33.4)
MCHC RBC AUTO-ENTMCNC: 33.5 G/DL (ref 32–36)
MCV RBC AUTO: 85 FL (ref 80–97)
MONOCYTES # BLD AUTO: 0.7 10^3/UL (ref 0.1–1.4)
MONOCYTES NFR BLD AUTO: 8.8 % (ref 3–13)
NEUTROPHILS # BLD AUTO: 6 10^3/UL (ref 1.7–8.2)
NEUTS SEG NFR BLD AUTO: 72.9 % (ref 42–78)
PLATELET # BLD: 324 10^3/UL (ref 150–450)
POTASSIUM SERPL-SCNC: 4.6 MMOL/L (ref 3.6–5)
PROT SERPL-MCNC: 6.7 G/DL (ref 6.3–8.2)
RBC # BLD AUTO: 3.47 10^6/UL (ref 4.35–5.55)
SODIUM SERPL-SCNC: 137.5 MMOL/L (ref 137–145)
TOTAL CELLS COUNTED % (AUTO): 100 %
WBC # BLD AUTO: 8.3 10^3/UL (ref 4–10.5)

## 2018-11-22 RX ADMIN — TIOTROPIUM BROMIDE SCH CAP: 18 CAPSULE ORAL; RESPIRATORY (INHALATION) at 09:55

## 2018-11-22 RX ADMIN — LISINOPRIL SCH MG: 5 TABLET ORAL at 09:53

## 2018-11-22 RX ADMIN — LANSOPRAZOLE SCH MG: 30 TABLET, ORALLY DISINTEGRATING, DELAYED RELEASE ORAL at 09:53

## 2018-11-22 RX ADMIN — ASPRIN AND EXTENDED-RELEASE DIPYRIDAMOLE SCH CAP.SR: 25; 200 CAPSULE ORAL at 17:25

## 2018-11-22 RX ADMIN — DOCUSATE SODIUM SCH: 100 CAPSULE, LIQUID FILLED ORAL at 09:56

## 2018-11-22 RX ADMIN — FLUOXETINE SCH MG: 20 CAPSULE ORAL at 09:56

## 2018-11-22 RX ADMIN — OXYCODONE HYDROCHLORIDE PRN MG: 5 TABLET ORAL at 05:06

## 2018-11-22 RX ADMIN — BUSPIRONE HYDROCHLORIDE SCH MG: 10 TABLET ORAL at 22:03

## 2018-11-22 RX ADMIN — TAMSULOSIN HYDROCHLORIDE SCH MG: 0.4 CAPSULE ORAL at 17:25

## 2018-11-22 RX ADMIN — Medication SCH: at 14:23

## 2018-11-22 RX ADMIN — DOCUSATE SODIUM SCH: 100 CAPSULE, LIQUID FILLED ORAL at 17:25

## 2018-11-22 RX ADMIN — BUSPIRONE HYDROCHLORIDE SCH MG: 10 TABLET ORAL at 09:54

## 2018-11-22 RX ADMIN — Medication SCH: at 05:04

## 2018-11-22 RX ADMIN — LEVOFLOXACIN SCH MG: 500 TABLET, FILM COATED ORAL at 12:28

## 2018-11-22 RX ADMIN — SIMVASTATIN SCH MG: 40 TABLET, FILM COATED ORAL at 22:03

## 2018-11-22 RX ADMIN — ACETAMINOPHEN PRN MG: 325 TABLET ORAL at 22:57

## 2018-11-22 RX ADMIN — ACETAMINOPHEN PRN MG: 325 TABLET ORAL at 01:58

## 2018-11-22 RX ADMIN — METOPROLOL SUCCINATE SCH MG: 25 TABLET, EXTENDED RELEASE ORAL at 09:54

## 2018-11-22 RX ADMIN — BENZTROPINE MESYLATE SCH MG: 1 TABLET ORAL at 09:55

## 2018-11-22 RX ADMIN — LEVOTHYROXINE SODIUM SCH MG: 25 TABLET ORAL at 05:06

## 2018-11-22 RX ADMIN — BENZTROPINE MESYLATE SCH MG: 1 TABLET ORAL at 17:25

## 2018-11-22 RX ADMIN — POTASSIUM CHLORIDE SCH MEQ: 750 TABLET, FILM COATED, EXTENDED RELEASE ORAL at 09:54

## 2018-11-22 RX ADMIN — OXYCODONE HYDROCHLORIDE PRN MG: 5 TABLET ORAL at 15:15

## 2018-11-22 RX ADMIN — ASPIRIN SCH MG: 81 TABLET, COATED ORAL at 09:54

## 2018-11-22 RX ADMIN — INSULIN GLARGINE SCH: 100 INJECTION, SOLUTION SUBCUTANEOUS at 22:04

## 2018-11-22 RX ADMIN — Medication SCH ML: at 22:03

## 2018-11-22 RX ADMIN — DIVALPROEX SODIUM SCH MG: 250 TABLET, FILM COATED, EXTENDED RELEASE ORAL at 09:53

## 2018-11-22 RX ADMIN — ASPRIN AND EXTENDED-RELEASE DIPYRIDAMOLE SCH CAP.SR: 25; 200 CAPSULE ORAL at 09:56

## 2018-11-22 RX ADMIN — INSULIN LISPRO PRN UNIT: 100 INJECTION, SOLUTION INTRAVENOUS; SUBCUTANEOUS at 16:49

## 2018-11-22 RX ADMIN — INSULIN LISPRO PRN UNIT: 100 INJECTION, SOLUTION INTRAVENOUS; SUBCUTANEOUS at 08:43

## 2018-11-22 NOTE — PDOC PROGRESS REPORT
Subjective


Progress Note for:: 11/22/18


Subjective:: 





Patient is resting in bed.  No dressing on except for the distal staples.  No 

issues overnight.


Reason For Visit: 


RIGHT LEG FRACTURE








Physical Exam


Vital Signs: 


 











Temp Pulse Resp BP Pulse Ox


 


 36.9 C   104 H  18   138/84 H  99 


 


 11/22/18 07:39  11/22/18 07:39  11/22/18 07:39  11/22/18 07:39  11/22/18 07:39








 Intake & Output











 11/21/18 11/22/18 11/23/18





 06:59 06:59 06:59


 


Intake Total 3048 3712 


 


Output Total 2300 3800 


 


Balance 748 -88 


 


Weight 66.1 kg 69.9 kg 











Adult Front & Back Image: 


  __________________________














  __________________________





 1 - Proximal incision is dry clean and intact with staples present.  Distal 

incision is covered with dressing with some serous drainage.  Ecchymosis 

throughout the leg and swelling with good sensation to light touch distally.  

Able to flex and extend the ankle but decreased range of motion second pain.








Results


Laboratory Results: 


 





 11/21/18 11:35 





 11/21/18 11:35 





 











  11/21/18 11/21/18





  11:35 11:35


 


WBC  7.5 


 


RBC  3.12 L 


 


Hgb  9.1 L 


 


Hct  26.7 L 


 


MCV  86 


 


MCH  29.0 


 


MCHC  33.9 


 


RDW  15.7 H 


 


Plt Count  279 


 


Seg Neutrophils %  72.6 


 


Lymphocytes %  14.9 


 


Monocytes %  8.4 


 


Eosinophils %  3.5 


 


Basophils %  0.6 


 


Absolute Neutrophils  5.5 


 


Absolute Lymphocytes  1.1 


 


Absolute Monocytes  0.6 


 


Absolute Eosinophils  0.3 


 


Absolute Basophils  0.0 


 


Sodium   133.8 L


 


Potassium   4.6


 


Chloride   96 L


 


Carbon Dioxide   25


 


Anion Gap   13


 


BUN   15


 


Creatinine   0.91


 


Est GFR ( Amer)   > 60


 


Est GFR (Non-Af Amer)   > 60


 


Glucose   235 H


 


Calcium   9.1


 


Magnesium   1.7


 


Total Bilirubin   0.5


 


AST   42


 


ALT   20 L


 


Alkaline Phosphatase   93


 


Total Protein   6.2 L


 


Albumin   3.3 L











Impressions: 


 





Chest X-Ray  11/15/18 00:00


IMPRESSION:  No acute findings


 








Fluoroscopy  11/15/18 00:00


IMPRESSION:  ORIF tibial fracture.  Refer to operative note for further 

information.


 








Pelvis X-Ray  11/15/18 00:00


IMPRESSION:


 


Postsurgical changes with heterotopic bone formation of the


proximal femurs bilaterally. No radiographic evidence for acute


fracture. Correlate with any history of inability to ambulate.


 


 


 2011 [employer]- All Rights Reserved


 








Tibia/Fibula X-Ray  11/15/18 00:00


IMPRESSION:  ORIF tibial fracture.  Refer to operative note for further 

information.


 








Ankle X-Ray  11/15/18 06:35


IMPRESSION:


 


Displaced oblique fractures of the distal fibular and tibial


diaphyses as described above.


 








Head CT  11/15/18 06:35


IMPRESSION:


 


1. There is no evidence of acute intracranial pathology.


2. Stable enlarged ventricles out of proportion to the sulci


which can be seen with normal pressure hydrocephalus.


3. 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


infarcts.


 














Assessment & Plan





- Diagnosis


(1) Displaced fracture of tibia


Qualifiers: 


   Encounter type: initial encounter   Tibia location: distal physis (incl. 

Salter-Prieto)   Laterality: right   Qualified Code(s): S89.101A - Unspecified 

physeal fracture of lower end of right tibia, initial encounter for closed 

fracture   


Is this a current diagnosis for this admission?: Yes   


Plan: 


48-year-old gentleman status post intramedullary nailing of the right tibia.  


Patient is instructed to continue with physical therapy and ambulate toe-touch 

weightbearing.  


Continue pain control and DVT prophylaxis.  


Patient will continue to have daily dressing changes for the distal serous 

drainage.

## 2018-11-22 NOTE — PDOC PROGRESS REPORT
Subjective


Progress Note for:: 11/22/18


Subjective:: 


Doing better. No longer having "nightmares" when he sleeps. Had had sitter due 

to agitation however improved. Feeling well overall. Denies fevers, chills. PO 

intake good. 


Reason For Visit: 


RIGHT LEG FRACTURE








Physical Exam


Vital Signs: 


 











Temp Pulse Resp BP Pulse Ox


 


 98.6 F   89   16   136/81 H  100 


 


 11/22/18 11:29  11/22/18 11:29  11/22/18 11:29  11/22/18 11:29  11/22/18 11:29








 Intake & Output











 11/21/18 11/22/18 11/23/18





 06:59 06:59 06:59


 


Intake Total 3048 3712 1005


 


Output Total 2300 3800 1600


 


Balance 748 -88 -595


 


Weight 66.1 kg 69.9 kg 











General appearance: PRESENT: no acute distress, cooperative


Head exam: PRESENT: atraumatic


Mouth exam: PRESENT: moist


Respiratory exam: PRESENT: unlabored.  ABSENT: tachypnea, wheezes


Cardiovascular exam: PRESENT: +S1, +S2.  ABSENT: tachycardia


GI/Abdominal exam: PRESENT: soft.  ABSENT: tenderness


Extremities exam: PRESENT: other - RLE: incision c/d/i. Ecchymosis noted 

throughout leg. Good ROM.


Neurological exam: PRESENT: alert, awake, CN II-XII grossly intact


Psychiatric exam: PRESENT: appropriate affect.  ABSENT: agitated





Results


Laboratory Results: 


 





 11/22/18 11:04 





 11/22/18 11:04 





 











  11/22/18 11/22/18





  11:04 11:04


 


WBC  8.3 


 


RBC  3.47 L 


 


Hgb  9.8 L 


 


Hct  29.3 L 


 


MCV  85 


 


MCH  28.4 


 


MCHC  33.5 


 


RDW  15.6 H 


 


Plt Count  324 


 


Seg Neutrophils %  72.9 


 


Lymphocytes %  14.0 


 


Monocytes %  8.8 


 


Eosinophils %  2.9 


 


Basophils %  1.4 


 


Absolute Neutrophils  6.0 


 


Absolute Lymphocytes  1.2 


 


Absolute Monocytes  0.7 


 


Absolute Eosinophils  0.2 


 


Absolute Basophils  0.1 


 


Sodium   137.5


 


Potassium   4.6


 


Chloride   97 L


 


Carbon Dioxide   25


 


Anion Gap   16


 


BUN   11


 


Creatinine   0.96


 


Est GFR ( Amer)   > 60


 


Est GFR (Non-Af Amer)   > 60


 


Glucose   126 H


 


Calcium   9.6


 


Magnesium   1.6


 


Total Bilirubin   0.4


 


AST   26


 


ALT   20 L


 


Alkaline Phosphatase   94


 


Total Protein   6.7


 


Albumin   3.6











Impressions: 


 





Chest X-Ray  11/15/18 00:00


IMPRESSION:  No acute findings


 








Fluoroscopy  11/15/18 00:00


IMPRESSION:  ORIF tibial fracture.  Refer to operative note for further 

information.


 








Pelvis X-Ray  11/15/18 00:00


IMPRESSION:


 


Postsurgical changes with heterotopic bone formation of the


proximal femurs bilaterally. No radiographic evidence for acute


fracture. Correlate with any history of inability to ambulate.


 


 


 2011 [employer]- All Rights Reserved


 








Tibia/Fibula X-Ray  11/15/18 00:00


IMPRESSION:  ORIF tibial fracture.  Refer to operative note for further 

information.


 








Ankle X-Ray  11/15/18 06:35


IMPRESSION:


 


Displaced oblique fractures of the distal fibular and tibial


diaphyses as described above.


 








Head CT  11/15/18 06:35


IMPRESSION:


 


1. There is no evidence of acute intracranial pathology.


2. Stable enlarged ventricles out of proportion to the sulci


which can be seen with normal pressure hydrocephalus.


3. 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


infarcts.


 














Assessment & Plan





- Diagnosis


(1) Displaced fracture of tibia


Qualifiers: 


   Encounter type: initial encounter   Tibia location: distal physis (incl. 

Salter-Pri[place])   Laterality: right   Qualified Code(s): S89.101A - Unspecified 

physeal fracture of lower end of right tibia, initial encounter for closed 

fracture   


Is this a current diagnosis for this admission?: Yes   


Plan: 


S/p ORIF on 11/16. 


- Limited ambulation  however working with PT


- Needs formal OT consultation


- LIkely placement in acute rehab 








(2) Altered mental status


Qualifiers: 


   Altered mental status type: unspecified   Qualified Code(s): R41.82 - 

Altered mental status, unspecified   


Is this a current diagnosis for this admission?: Yes   


Plan: 


Improved; psych following


- On Prozac 40mg daily, Depakoe 250mg BID, and Buspar 10mg daily


- Discontinued sitter on 11/22 due to improvement in behavior 








(3) Diabetes mellitus, insulin dependent (IDDM), uncontrolled


Qualifiers: 


   Glycemic state: with hyperglycemia   Qualified Code(s): E10.65 - Type 1 

diabetes mellitus with hyperglycemia   


Is this a current diagnosis for this admission?: Yes   


Plan: 


Well controlled here CTM 








(4) Bipolar 1 disorder


Is this a current diagnosis for this admission?: Yes   


Plan: 


Per above. Psych following








- Time


Time Spent with patient: Less than 15 minutes


Anticipated discharge: Acute Rehab


Within: within 72 hours

## 2018-11-23 LAB
ADD MANUAL DIFF: NO
ALBUMIN SERPL-MCNC: 3.6 G/DL (ref 3.5–5)
ALP SERPL-CCNC: 102 U/L (ref 38–126)
ALT SERPL-CCNC: 19 U/L (ref 21–72)
ANION GAP SERPL CALC-SCNC: 14 MMOL/L (ref 5–19)
AST SERPL-CCNC: 21 U/L (ref 17–59)
BASOPHILS # BLD AUTO: 0.1 10^3/UL (ref 0–0.2)
BASOPHILS NFR BLD AUTO: 0.8 % (ref 0–2)
BILIRUB DIRECT SERPL-MCNC: 0.3 MG/DL (ref 0–0.4)
BILIRUB SERPL-MCNC: 0.4 MG/DL (ref 0.2–1.3)
BUN SERPL-MCNC: 12 MG/DL (ref 7–20)
CALCIUM: 9.4 MG/DL (ref 8.4–10.2)
CHLORIDE SERPL-SCNC: 96 MMOL/L (ref 98–107)
CO2 SERPL-SCNC: 24 MMOL/L (ref 22–30)
EOSINOPHIL # BLD AUTO: 0.1 10^3/UL (ref 0–0.6)
EOSINOPHIL NFR BLD AUTO: 1.8 % (ref 0–6)
ERYTHROCYTE [DISTWIDTH] IN BLOOD BY AUTOMATED COUNT: 15.7 % (ref 11.5–14)
GLUCOSE SERPL-MCNC: 295 MG/DL (ref 75–110)
HCT VFR BLD CALC: 29.6 % (ref 37.9–51)
HGB BLD-MCNC: 9.9 G/DL (ref 13.5–17)
LYMPHOCYTES # BLD AUTO: 1 10^3/UL (ref 0.5–4.7)
LYMPHOCYTES NFR BLD AUTO: 13.5 % (ref 13–45)
MCH RBC QN AUTO: 28.8 PG (ref 27–33.4)
MCHC RBC AUTO-ENTMCNC: 33.5 G/DL (ref 32–36)
MCV RBC AUTO: 86 FL (ref 80–97)
MONOCYTES # BLD AUTO: 0.5 10^3/UL (ref 0.1–1.4)
MONOCYTES NFR BLD AUTO: 6.7 % (ref 3–13)
NEUTROPHILS # BLD AUTO: 6 10^3/UL (ref 1.7–8.2)
NEUTS SEG NFR BLD AUTO: 77.2 % (ref 42–78)
PLATELET # BLD: 320 10^3/UL (ref 150–450)
POTASSIUM SERPL-SCNC: 4.9 MMOL/L (ref 3.6–5)
PROT SERPL-MCNC: 6.6 G/DL (ref 6.3–8.2)
RBC # BLD AUTO: 3.45 10^6/UL (ref 4.35–5.55)
SODIUM SERPL-SCNC: 134.4 MMOL/L (ref 137–145)
TOTAL CELLS COUNTED % (AUTO): 100 %
WBC # BLD AUTO: 7.7 10^3/UL (ref 4–10.5)

## 2018-11-23 RX ADMIN — METOPROLOL SUCCINATE SCH MG: 25 TABLET, EXTENDED RELEASE ORAL at 10:18

## 2018-11-23 RX ADMIN — LANSOPRAZOLE SCH MG: 30 TABLET, ORALLY DISINTEGRATING, DELAYED RELEASE ORAL at 10:16

## 2018-11-23 RX ADMIN — DOCUSATE SODIUM SCH MG: 100 CAPSULE, LIQUID FILLED ORAL at 10:17

## 2018-11-23 RX ADMIN — ALUMINUM HYDROXIDE, MAGNESIUM HYDROXIDE, AND SIMETHICONE PRN ML: 200; 200; 20 SUSPENSION ORAL at 23:41

## 2018-11-23 RX ADMIN — ASPIRIN SCH MG: 81 TABLET, COATED ORAL at 10:17

## 2018-11-23 RX ADMIN — BUSPIRONE HYDROCHLORIDE SCH MG: 10 TABLET ORAL at 10:16

## 2018-11-23 RX ADMIN — TIOTROPIUM BROMIDE SCH CAP: 18 CAPSULE ORAL; RESPIRATORY (INHALATION) at 10:57

## 2018-11-23 RX ADMIN — ASPRIN AND EXTENDED-RELEASE DIPYRIDAMOLE SCH CAP.SR: 25; 200 CAPSULE ORAL at 10:15

## 2018-11-23 RX ADMIN — LISINOPRIL SCH MG: 5 TABLET ORAL at 10:17

## 2018-11-23 RX ADMIN — BENZTROPINE MESYLATE SCH MG: 1 TABLET ORAL at 10:16

## 2018-11-23 RX ADMIN — Medication SCH ML: at 13:01

## 2018-11-23 RX ADMIN — DIVALPROEX SODIUM SCH MG: 250 TABLET, FILM COATED, EXTENDED RELEASE ORAL at 10:16

## 2018-11-23 RX ADMIN — Medication SCH ML: at 05:33

## 2018-11-23 RX ADMIN — ALUMINUM HYDROXIDE, MAGNESIUM HYDROXIDE, AND SIMETHICONE PRN ML: 200; 200; 20 SUSPENSION ORAL at 17:25

## 2018-11-23 RX ADMIN — Medication SCH ML: at 21:48

## 2018-11-23 RX ADMIN — BACLOFEN PRN MG: 10 TABLET ORAL at 13:00

## 2018-11-23 RX ADMIN — INSULIN LISPRO PRN UNIT: 100 INJECTION, SOLUTION INTRAVENOUS; SUBCUTANEOUS at 22:10

## 2018-11-23 RX ADMIN — LEVOTHYROXINE SODIUM SCH MG: 25 TABLET ORAL at 05:33

## 2018-11-23 RX ADMIN — BUSPIRONE HYDROCHLORIDE SCH MG: 10 TABLET ORAL at 21:47

## 2018-11-23 RX ADMIN — SIMVASTATIN SCH MG: 40 TABLET, FILM COATED ORAL at 21:47

## 2018-11-23 RX ADMIN — BENZTROPINE MESYLATE SCH MG: 1 TABLET ORAL at 17:25

## 2018-11-23 RX ADMIN — ACETAMINOPHEN PRN MG: 325 TABLET ORAL at 19:55

## 2018-11-23 RX ADMIN — BACLOFEN PRN MG: 10 TABLET ORAL at 23:41

## 2018-11-23 RX ADMIN — FLUOXETINE SCH MG: 20 CAPSULE ORAL at 10:15

## 2018-11-23 RX ADMIN — POTASSIUM CHLORIDE SCH MEQ: 750 TABLET, FILM COATED, EXTENDED RELEASE ORAL at 10:17

## 2018-11-23 RX ADMIN — DOCUSATE SODIUM SCH MG: 100 CAPSULE, LIQUID FILLED ORAL at 17:25

## 2018-11-23 RX ADMIN — INSULIN LISPRO PRN UNIT: 100 INJECTION, SOLUTION INTRAVENOUS; SUBCUTANEOUS at 11:55

## 2018-11-23 RX ADMIN — TAMSULOSIN HYDROCHLORIDE SCH MG: 0.4 CAPSULE ORAL at 17:25

## 2018-11-23 RX ADMIN — LEVOFLOXACIN SCH MG: 500 TABLET, FILM COATED ORAL at 11:53

## 2018-11-23 RX ADMIN — ASPRIN AND EXTENDED-RELEASE DIPYRIDAMOLE SCH CAP.SR: 25; 200 CAPSULE ORAL at 17:25

## 2018-11-23 NOTE — PDOC PROGRESS REPORT
Subjective


Progress Note for:: 11/23/18


Subjective:: 


States that "I had a rough night. My lungs are congested". However denies SOB, 

cough, fevers, chills. States that he continues to have some trouble sleeping 

but no longer having nightmares. Agitation improved. Did not work with PT/OT 

yet.  


Reason For Visit: 


FX OF RIGHT LEG








Physical Exam


Vital Signs: 


 











Temp Pulse Resp BP Pulse Ox


 


 99.0 F   92   22 H  135/76 H  99 


 


 11/23/18 11:16  11/23/18 11:16  11/23/18 11:16  11/23/18 11:16  11/23/18 11:16








 Intake & Output











 11/22/18 11/23/18 11/24/18





 06:59 06:59 06:59


 


Intake Total 3712 1449 


 


Output Total 3800 3350 


 


Balance -88 -1901 


 


Weight 69.9 kg 71.2 kg 











General appearance: PRESENT: no acute distress, cooperative, other - Resting in 

bed


Head exam: PRESENT: atraumatic


Mouth exam: PRESENT: moist


Cardiovascular exam: PRESENT: +S1, +S2.  ABSENT: tachycardia


GI/Abdominal exam: PRESENT: soft.  ABSENT: tenderness


Extremities exam: PRESENT: other - RLE: incision remains c/d/i. Ecchymosis noted

; ROM present.


Neurological exam: PRESENT: alert, awake


Psychiatric exam: PRESENT: anxious


Skin exam: PRESENT: dry





Results


Laboratory Results: 


 





 11/23/18 10:40 





 11/23/18 10:40 





 











  11/23/18 11/23/18





  10:40 10:40


 


WBC  7.7 


 


RBC  3.45 L 


 


Hgb  9.9 L 


 


Hct  29.6 L 


 


MCV  86 


 


MCH  28.8 


 


MCHC  33.5 


 


RDW  15.7 H 


 


Plt Count  320 


 


Seg Neutrophils %  77.2 


 


Lymphocytes %  13.5 


 


Monocytes %  6.7 


 


Eosinophils %  1.8 


 


Basophils %  0.8 


 


Absolute Neutrophils  6.0 


 


Absolute Lymphocytes  1.0 


 


Absolute Monocytes  0.5 


 


Absolute Eosinophils  0.1 


 


Absolute Basophils  0.1 


 


Sodium   134.4 L


 


Potassium   4.9


 


Chloride   96 L


 


Carbon Dioxide   24


 


Anion Gap   14


 


BUN   12


 


Creatinine   0.99


 


Est GFR ( Amer)   > 60


 


Est GFR (Non-Af Amer)   > 60


 


Glucose   295 H


 


Calcium   9.4


 


Magnesium   1.5 L


 


Total Bilirubin   0.4


 


AST   21


 


ALT   19 L


 


Alkaline Phosphatase   102


 


Total Protein   6.6


 


Albumin   3.6











Impressions: 


 





Chest X-Ray  11/15/18 00:00


IMPRESSION:  No acute findings


 








Fluoroscopy  11/15/18 00:00


IMPRESSION:  ORIF tibial fracture.  Refer to operative note for further 

information.


 








Pelvis X-Ray  11/15/18 00:00


IMPRESSION:


 


Postsurgical changes with heterotopic bone formation of the


proximal femurs bilaterally. No radiographic evidence for acute


fracture. Correlate with any history of inability to ambulate.


 


 


 2011 [employer]- All Rights Reserved


 








Tibia/Fibula X-Ray  11/15/18 00:00


IMPRESSION:  ORIF tibial fracture.  Refer to operative note for further 

information.


 








Ankle X-Ray  11/15/18 06:35


IMPRESSION:


 


Displaced oblique fractures of the distal fibular and tibial


diaphyses as described above.


 








Head CT  11/15/18 06:35


IMPRESSION:


 


1. There is no evidence of acute intracranial pathology.


2. Stable enlarged ventricles out of proportion to the sulci


which can be seen with normal pressure hydrocephalus.


3. 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


infarcts.


 














Assessment & Plan





- Diagnosis


(1) Displaced fracture of tibia


Qualifiers: 


   Encounter type: initial encounter   Tibia location: distal physis (incl. 

Salter-Pri[place])   Laterality: right   Qualified Code(s): S89.101A - Unspecified 

physeal fracture of lower end of right tibia, initial encounter for closed 

fracture   


Is this a current diagnosis for this admission?: Yes   


Plan: 


S/p ORIF on 11/16. 


- Limited ambulation  however working with PT


- Still AWAITING formal OT consultation!!!


- Needs placement in acute rehab, discharge planning aware 








(2) Altered mental status


Qualifiers: 


   Altered mental status type: unspecified   Qualified Code(s): R41.82 - 

Altered mental status, unspecified   


Is this a current diagnosis for this admission?: Yes   


Plan: 


Improved; psych following


- On Prozac 40mg daily, Depakoe 250mg BID, and Buspar 10mg daily


- Discontinued sitter again on 11/23








(3) Diabetes mellitus, insulin dependent (IDDM), uncontrolled


Qualifiers: 


   Glycemic state: with hyperglycemia   Qualified Code(s): E10.65 - Type 1 

diabetes mellitus with hyperglycemia   


Is this a current diagnosis for this admission?: Yes   


Plan: 


Well controlled


- CTM 








(4) Bipolar 1 disorder


Is this a current diagnosis for this admission?: Yes   


Plan: 


Per above. Psych following








- Time


Anticipated discharge: Acute Rehab


Within: Other - Following PT/OT evaluation

## 2018-11-24 RX ADMIN — LEVOTHYROXINE SODIUM SCH MG: 25 TABLET ORAL at 05:37

## 2018-11-24 RX ADMIN — BUSPIRONE HYDROCHLORIDE SCH MG: 10 TABLET ORAL at 09:21

## 2018-11-24 RX ADMIN — METOPROLOL SUCCINATE SCH MG: 25 TABLET, EXTENDED RELEASE ORAL at 09:22

## 2018-11-24 RX ADMIN — DIVALPROEX SODIUM SCH MG: 250 TABLET, FILM COATED, EXTENDED RELEASE ORAL at 09:22

## 2018-11-24 RX ADMIN — POTASSIUM CHLORIDE SCH: 750 TABLET, FILM COATED, EXTENDED RELEASE ORAL at 09:26

## 2018-11-24 RX ADMIN — ASPRIN AND EXTENDED-RELEASE DIPYRIDAMOLE SCH CAP.SR: 25; 200 CAPSULE ORAL at 17:08

## 2018-11-24 RX ADMIN — INSULIN LISPRO PRN UNIT: 100 INJECTION, SOLUTION INTRAVENOUS; SUBCUTANEOUS at 11:43

## 2018-11-24 RX ADMIN — ASPIRIN SCH MG: 81 TABLET, COATED ORAL at 09:21

## 2018-11-24 RX ADMIN — SIMVASTATIN SCH MG: 40 TABLET, FILM COATED ORAL at 21:43

## 2018-11-24 RX ADMIN — OXYCODONE HYDROCHLORIDE PRN MG: 5 TABLET ORAL at 21:43

## 2018-11-24 RX ADMIN — OXYCODONE HYDROCHLORIDE PRN MG: 5 TABLET ORAL at 14:57

## 2018-11-24 RX ADMIN — ASPRIN AND EXTENDED-RELEASE DIPYRIDAMOLE SCH CAP.SR: 25; 200 CAPSULE ORAL at 09:21

## 2018-11-24 RX ADMIN — LANSOPRAZOLE SCH MG: 30 TABLET, ORALLY DISINTEGRATING, DELAYED RELEASE ORAL at 07:56

## 2018-11-24 RX ADMIN — INSULIN LISPRO PRN UNIT: 100 INJECTION, SOLUTION INTRAVENOUS; SUBCUTANEOUS at 07:56

## 2018-11-24 RX ADMIN — BUSPIRONE HYDROCHLORIDE SCH MG: 10 TABLET ORAL at 21:43

## 2018-11-24 RX ADMIN — Medication SCH ML: at 05:37

## 2018-11-24 RX ADMIN — DOCUSATE SODIUM SCH MG: 100 CAPSULE, LIQUID FILLED ORAL at 17:09

## 2018-11-24 RX ADMIN — TAMSULOSIN HYDROCHLORIDE SCH MG: 0.4 CAPSULE ORAL at 17:08

## 2018-11-24 RX ADMIN — BACLOFEN PRN MG: 10 TABLET ORAL at 14:09

## 2018-11-24 RX ADMIN — LEVOFLOXACIN SCH MG: 500 TABLET, FILM COATED ORAL at 11:43

## 2018-11-24 RX ADMIN — INSULIN LISPRO PRN UNIT: 100 INJECTION, SOLUTION INTRAVENOUS; SUBCUTANEOUS at 21:47

## 2018-11-24 RX ADMIN — BENZTROPINE MESYLATE SCH MG: 1 TABLET ORAL at 09:21

## 2018-11-24 RX ADMIN — FLUOXETINE SCH MG: 20 CAPSULE ORAL at 09:23

## 2018-11-24 RX ADMIN — INSULIN LISPRO PRN UNIT: 100 INJECTION, SOLUTION INTRAVENOUS; SUBCUTANEOUS at 18:13

## 2018-11-24 RX ADMIN — LISINOPRIL SCH MG: 5 TABLET ORAL at 09:23

## 2018-11-24 RX ADMIN — DOCUSATE SODIUM SCH: 100 CAPSULE, LIQUID FILLED ORAL at 09:24

## 2018-11-24 RX ADMIN — BENZTROPINE MESYLATE SCH MG: 1 TABLET ORAL at 17:08

## 2018-11-24 RX ADMIN — TIOTROPIUM BROMIDE SCH CAP: 18 CAPSULE ORAL; RESPIRATORY (INHALATION) at 09:24

## 2018-11-24 NOTE — PDOC PROGRESS REPORT
Subjective


Progress Note for:: 11/24/18


Subjective:: 





No adverse events overnight.  No new complaints.  Vital signs were stable.  

Eating and drinking without difficulty.


Reason For Visit: 


FX OF RIGHT LEG








Physical Exam


Vital Signs: 


 











Temp Pulse Resp BP Pulse Ox


 


 98.2 F   85   16   117/57 L  100 


 


 11/24/18 11:17  11/24/18 11:17  11/24/18 11:17  11/24/18 11:17  11/24/18 11:17








 Intake & Output











 11/23/18 11/24/18 11/25/18





 06:59 06:59 06:59


 


Intake Total 1449 1642 592


 


Output Total 3350 2125 560


 


Balance -1901 -483 32


 


Weight 71.2 kg 64.3 kg 








General appearance: PRESENT: no acute distress, cooperative


Cardiovascular exam: PRESENT: +S1, +S2.  ABSENT: tachycardia


GI/Abdominal exam: PRESENT: soft.  ABSENT: tenderness


Extremities exam: PRESENT: other - RLE: incision remains c/d/i. Ecchymosis


Neurological exam: PRESENT: alert, awake, oriented x3


Psychiatric exam: PRESENT: anxious


Skin exam: PRESENT: dry





Results


Laboratory Results: 


 





 11/23/18 10:40 





 11/23/18 10:40 








Impressions: 


 





Chest X-Ray  11/15/18 00:00


IMPRESSION:  No acute findings


 








Fluoroscopy  11/15/18 00:00


IMPRESSION:  ORIF tibial fracture.  Refer to operative note for further 

information.


 








Pelvis X-Ray  11/15/18 00:00


IMPRESSION:


 


Postsurgical changes with heterotopic bone formation of the


proximal femurs bilaterally. No radiographic evidence for acute


fracture. Correlate with any history of inability to ambulate.


 


 


 2011 [employer]- All Rights Reserved


 








Tibia/Fibula X-Ray  11/15/18 00:00


IMPRESSION:  ORIF tibial fracture.  Refer to operative note for further 

information.


 








Ankle X-Ray  11/15/18 06:35


IMPRESSION:


 


Displaced oblique fractures of the distal fibular and tibial


diaphyses as described above.


 








Head CT  11/15/18 06:35


IMPRESSION:


 


1. There is no evidence of acute intracranial pathology.


2. Stable enlarged ventricles out of proportion to the sulci


which can be seen with normal pressure hydrocephalus.


3. 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


infarcts.


 














Assessment & Plan





- Diagnosis


(1) Displaced fracture of tibia


Qualifiers: 


   Encounter type: initial encounter   Tibia location: distal physis (incl. 

[place])   Laterality: right   Qualified Code(s): S89.101A - Unspecified 

physeal fracture of lower end of right tibia, initial encounter for closed 

fracture   


Is this a current diagnosis for this admission?: Yes   


Plan: 


Activity recommendations per orthopedics.  Pain is well controlled.  Currently 

trying to get him placed in a rehab.








(2) Bipolar 1 disorder


Is this a current diagnosis for this admission?: Yes   


Plan: 


Currently at baseline without showing any signs of decompensation








- Time


Time Spent with patient: 15-24 minutes

## 2018-11-24 NOTE — PDOC PROGRESS REPORT
Subjective


Progress Note for:: 11/24/18


Subjective:: 





Patient lying in bed.  Complains of pain in his right leg.  Is not received 

oxycodone because it is dropped off his MAR.  Denies chest pain or shortness of 

breath.


Reason For Visit: 


FX OF RIGHT LEG








Physical Exam


Vital Signs: 


 











Temp Pulse Resp BP Pulse Ox


 


 99.2 F   88   16   117/73   100 


 


 11/23/18 23:45  11/23/18 23:45  11/23/18 23:45  11/23/18 23:45  11/23/18 23:45








 Intake & Output











 11/23/18 11/24/18 11/25/18





 06:59 06:59 06:59


 


Intake Total 1449 1642 


 


Output Total 3350 1235 


 


Balance -1901 -483 


 


Weight 71.2 kg 64.3 kg 











Musculoskeletal exam: PRESENT: other - Right leg: Mild serosanguineous drainage 

from the distal locking screw sites.  Ecchymosis along the ankle.  The incision 

well approximated no erythema or drainage.  No calf tenderness.  No sign of 

compartment syndrome.





Results


Laboratory Results: 


 





 11/23/18 10:40 





 11/23/18 10:40 





 











  11/23/18 11/23/18





  10:40 10:40


 


WBC  7.7 


 


RBC  3.45 L 


 


Hgb  9.9 L 


 


Hct  29.6 L 


 


MCV  86 


 


MCH  28.8 


 


MCHC  33.5 


 


RDW  15.7 H 


 


Plt Count  320 


 


Seg Neutrophils %  77.2 


 


Lymphocytes %  13.5 


 


Monocytes %  6.7 


 


Eosinophils %  1.8 


 


Basophils %  0.8 


 


Absolute Neutrophils  6.0 


 


Absolute Lymphocytes  1.0 


 


Absolute Monocytes  0.5 


 


Absolute Eosinophils  0.1 


 


Absolute Basophils  0.1 


 


Sodium   134.4 L


 


Potassium   4.9


 


Chloride   96 L


 


Carbon Dioxide   24


 


Anion Gap   14


 


BUN   12


 


Creatinine   0.99


 


Est GFR ( Amer)   > 60


 


Est GFR (Non-Af Amer)   > 60


 


Glucose   295 H


 


Calcium   9.4


 


Magnesium   1.5 L


 


Total Bilirubin   0.4


 


AST   21


 


ALT   19 L


 


Alkaline Phosphatase   102


 


Total Protein   6.6


 


Albumin   3.6











Impressions: 


 





Chest X-Ray  11/15/18 00:00


IMPRESSION:  No acute findings


 








Fluoroscopy  11/15/18 00:00


IMPRESSION:  ORIF tibial fracture.  Refer to operative note for further 

information.


 








Pelvis X-Ray  11/15/18 00:00


IMPRESSION:


 


Postsurgical changes with heterotopic bone formation of the


proximal femurs bilaterally. No radiographic evidence for acute


fracture. Correlate with any history of inability to ambulate.


 


 


 2011 [employer]- All Rights Reserved


 








Tibia/Fibula X-Ray  11/15/18 00:00


IMPRESSION:  ORIF tibial fracture.  Refer to operative note for further 

information.


 








Ankle X-Ray  11/15/18 06:35


IMPRESSION:


 


Displaced oblique fractures of the distal fibular and tibial


diaphyses as described above.


 








Head CT  11/15/18 06:35


IMPRESSION:


 


1. There is no evidence of acute intracranial pathology.


2. Stable enlarged ventricles out of proportion to the sulci


which can be seen with normal pressure hydrocephalus.


3. 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


infarcts.


 














Assessment & Plan





- Diagnosis


(1) Displaced fracture of tibia


Qualifiers: 


   Encounter type: initial encounter   Tibia location: distal physis (incl. 

Sal[place]ri[place])   Laterality: right   Qualified Code(s): S89.101A - Unspecified 

physeal fracture of lower end of right tibia, initial encounter for closed 

fracture   


Is this a current diagnosis for this admission?: Yes   


Plan: 


Status post IM nail right tibia





1.  Pain control will restart oxycodone


2.  Aspirin for DVT prophylaxis


3.  Physical therapy touchdown weightbearing


4.  Discharge to skilled nursing facility when bed available

## 2018-11-25 RX ADMIN — DOCUSATE SODIUM SCH MG: 100 CAPSULE, LIQUID FILLED ORAL at 10:29

## 2018-11-25 RX ADMIN — ACETAMINOPHEN PRN MG: 325 TABLET ORAL at 16:07

## 2018-11-25 RX ADMIN — METOPROLOL SUCCINATE SCH MG: 25 TABLET, EXTENDED RELEASE ORAL at 10:30

## 2018-11-25 RX ADMIN — LANSOPRAZOLE SCH MG: 30 TABLET, ORALLY DISINTEGRATING, DELAYED RELEASE ORAL at 10:29

## 2018-11-25 RX ADMIN — LEVOFLOXACIN SCH MG: 500 TABLET, FILM COATED ORAL at 11:55

## 2018-11-25 RX ADMIN — ACETAMINOPHEN PRN MG: 325 TABLET ORAL at 21:59

## 2018-11-25 RX ADMIN — INSULIN LISPRO PRN UNIT: 100 INJECTION, SOLUTION INTRAVENOUS; SUBCUTANEOUS at 13:00

## 2018-11-25 RX ADMIN — DIVALPROEX SODIUM SCH MG: 250 TABLET, FILM COATED, EXTENDED RELEASE ORAL at 10:29

## 2018-11-25 RX ADMIN — OXYCODONE HYDROCHLORIDE PRN MG: 5 TABLET ORAL at 10:35

## 2018-11-25 RX ADMIN — INSULIN LISPRO PRN UNIT: 100 INJECTION, SOLUTION INTRAVENOUS; SUBCUTANEOUS at 21:59

## 2018-11-25 RX ADMIN — BACLOFEN PRN MG: 10 TABLET ORAL at 13:00

## 2018-11-25 RX ADMIN — TAMSULOSIN HYDROCHLORIDE SCH MG: 0.4 CAPSULE ORAL at 17:34

## 2018-11-25 RX ADMIN — POTASSIUM CHLORIDE SCH MEQ: 750 TABLET, FILM COATED, EXTENDED RELEASE ORAL at 10:29

## 2018-11-25 RX ADMIN — BUSPIRONE HYDROCHLORIDE SCH MG: 10 TABLET ORAL at 10:30

## 2018-11-25 RX ADMIN — FLUOXETINE SCH MG: 20 CAPSULE ORAL at 10:32

## 2018-11-25 RX ADMIN — OXYCODONE HYDROCHLORIDE PRN MG: 5 TABLET ORAL at 21:58

## 2018-11-25 RX ADMIN — BENZTROPINE MESYLATE SCH MG: 1 TABLET ORAL at 10:32

## 2018-11-25 RX ADMIN — DOCUSATE SODIUM SCH MG: 100 CAPSULE, LIQUID FILLED ORAL at 17:31

## 2018-11-25 RX ADMIN — TIOTROPIUM BROMIDE SCH CAP: 18 CAPSULE ORAL; RESPIRATORY (INHALATION) at 10:33

## 2018-11-25 RX ADMIN — ASPRIN AND EXTENDED-RELEASE DIPYRIDAMOLE SCH CAP.SR: 25; 200 CAPSULE ORAL at 17:32

## 2018-11-25 RX ADMIN — LISINOPRIL SCH MG: 5 TABLET ORAL at 10:29

## 2018-11-25 RX ADMIN — LEVOTHYROXINE SODIUM SCH MG: 25 TABLET ORAL at 05:13

## 2018-11-25 RX ADMIN — BENZTROPINE MESYLATE SCH MG: 1 TABLET ORAL at 17:32

## 2018-11-25 RX ADMIN — ASPRIN AND EXTENDED-RELEASE DIPYRIDAMOLE SCH CAP.SR: 25; 200 CAPSULE ORAL at 10:32

## 2018-11-25 RX ADMIN — ACETAMINOPHEN PRN MG: 325 TABLET ORAL at 00:49

## 2018-11-25 RX ADMIN — SIMVASTATIN SCH MG: 40 TABLET, FILM COATED ORAL at 21:59

## 2018-11-25 RX ADMIN — ASPIRIN SCH MG: 81 TABLET, COATED ORAL at 10:29

## 2018-11-25 RX ADMIN — BUSPIRONE HYDROCHLORIDE SCH MG: 10 TABLET ORAL at 21:59

## 2018-11-25 NOTE — PDOC PROGRESS REPORT
Subjective


Progress Note for:: 11/25/18


Subjective:: 





No adverse events overnight.  No new complaints.  Vital signs were stable.  

Eating and drinking without difficulty.


Reason For Visit: 


FX OF RIGHT LEG








Physical Exam


Vital Signs: 


 











Temp Pulse Resp BP Pulse Ox


 


 98.8 F   85   16   102/68   100 


 


 11/25/18 13:06  11/25/18 13:06  11/25/18 13:06  11/25/18 13:06  11/25/18 13:06








 Intake & Output











 11/24/18 11/25/18 11/26/18





 06:59 06:59 06:59


 


Intake Total 1642 829 


 


Output Total 2125 1815 


 


Balance -483 -986 


 


Weight 64.3 kg 62.7 kg 








General appearance: PRESENT: no acute distress, cooperative


Cardiovascular exam: PRESENT: +S1, +S2.  ABSENT: tachycardia


GI/Abdominal exam: PRESENT: soft.  ABSENT: tenderness


Extremities exam: PRESENT: other - RLE: incision remains c/d/i. Ecchymosis


Neurological exam: PRESENT: alert, awake, oriented x3


Psychiatric exam: PRESENT: anxious


Skin exam: PRESENT: dry








Results


Laboratory Results: 


 





 11/23/18 10:40 





 11/23/18 10:40 








Impressions: 


 





Chest X-Ray  11/15/18 00:00


IMPRESSION:  No acute findings


 








Fluoroscopy  11/15/18 00:00


IMPRESSION:  ORIF tibial fracture.  Refer to operative note for further 

information.


 








Pelvis X-Ray  11/15/18 00:00


IMPRESSION:


 


Postsurgical changes with heterotopic bone formation of the


proximal femurs bilaterally. No radiographic evidence for acute


fracture. Correlate with any history of inability to ambulate.


 


 


 2011 [employer]- All Rights Reserved


 








Tibia/Fibula X-Ray  11/15/18 00:00


IMPRESSION:  ORIF tibial fracture.  Refer to operative note for further 

information.


 








Ankle X-Ray  11/15/18 06:35


IMPRESSION:


 


Displaced oblique fractures of the distal fibular and tibial


diaphyses as described above.


 








Head CT  11/15/18 06:35


IMPRESSION:


 


1. There is no evidence of acute intracranial pathology.


2. Stable enlarged ventricles out of proportion to the sulci


which can be seen with normal pressure hydrocephalus.


3. 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


infarcts.


 














Assessment & Plan





- Diagnosis


(1) Displaced fracture of tibia


Qualifiers: 


   Encounter type: initial encounter   Tibia location: distal physis (incl. 

Salter-Pri[place])   Laterality: right   Qualified Code(s): S89.101A - Unspecified 

physeal fracture of lower end of right tibia, initial encounter for closed 

fracture   


Is this a current diagnosis for this admission?: Yes   


Plan: 


Activity recommendations per orthopedics.  Pain is well controlled.  Currently 

trying to get him placed in a rehab.








(2) Bipolar 1 disorder


Is this a current diagnosis for this admission?: Yes   


Plan: 


Currently at baseline without showing any signs of decompensation








- Time


Time Spent with patient: 25-34 minutes

## 2018-11-26 RX ADMIN — ASPRIN AND EXTENDED-RELEASE DIPYRIDAMOLE SCH CAP.SR: 25; 200 CAPSULE ORAL at 19:13

## 2018-11-26 RX ADMIN — BACLOFEN PRN MG: 10 TABLET ORAL at 23:10

## 2018-11-26 RX ADMIN — CYCLOBENZAPRINE HYDROCHLORIDE PRN MG: 10 TABLET, FILM COATED ORAL at 23:31

## 2018-11-26 RX ADMIN — BUSPIRONE HYDROCHLORIDE SCH MG: 10 TABLET ORAL at 11:54

## 2018-11-26 RX ADMIN — BUSPIRONE HYDROCHLORIDE SCH MG: 10 TABLET ORAL at 21:52

## 2018-11-26 RX ADMIN — INSULIN LISPRO PRN UNIT: 100 INJECTION, SOLUTION INTRAVENOUS; SUBCUTANEOUS at 21:52

## 2018-11-26 RX ADMIN — LEVOTHYROXINE SODIUM SCH MG: 25 TABLET ORAL at 05:19

## 2018-11-26 RX ADMIN — LEVOFLOXACIN SCH MG: 500 TABLET, FILM COATED ORAL at 11:54

## 2018-11-26 RX ADMIN — FLUOXETINE SCH MG: 20 CAPSULE ORAL at 12:00

## 2018-11-26 RX ADMIN — LISINOPRIL SCH MG: 5 TABLET ORAL at 11:38

## 2018-11-26 RX ADMIN — DOCUSATE SODIUM SCH MG: 100 CAPSULE, LIQUID FILLED ORAL at 11:38

## 2018-11-26 RX ADMIN — ASPRIN AND EXTENDED-RELEASE DIPYRIDAMOLE SCH CAP.SR: 25; 200 CAPSULE ORAL at 11:59

## 2018-11-26 RX ADMIN — INSULIN LISPRO PRN UNIT: 100 INJECTION, SOLUTION INTRAVENOUS; SUBCUTANEOUS at 12:05

## 2018-11-26 RX ADMIN — METOPROLOL SUCCINATE SCH MG: 25 TABLET, EXTENDED RELEASE ORAL at 11:55

## 2018-11-26 RX ADMIN — ASPIRIN SCH MG: 81 TABLET, COATED ORAL at 11:57

## 2018-11-26 RX ADMIN — TIOTROPIUM BROMIDE SCH CAP: 18 CAPSULE ORAL; RESPIRATORY (INHALATION) at 11:59

## 2018-11-26 RX ADMIN — POTASSIUM CHLORIDE SCH MEQ: 750 TABLET, FILM COATED, EXTENDED RELEASE ORAL at 11:56

## 2018-11-26 RX ADMIN — BENZTROPINE MESYLATE SCH MG: 1 TABLET ORAL at 12:00

## 2018-11-26 RX ADMIN — ACETAMINOPHEN PRN MG: 325 TABLET ORAL at 21:52

## 2018-11-26 RX ADMIN — SIMVASTATIN SCH MG: 40 TABLET, FILM COATED ORAL at 21:52

## 2018-11-26 RX ADMIN — DOCUSATE SODIUM SCH MG: 100 CAPSULE, LIQUID FILLED ORAL at 19:13

## 2018-11-26 RX ADMIN — OXYCODONE HYDROCHLORIDE PRN MG: 5 TABLET ORAL at 21:52

## 2018-11-26 RX ADMIN — TAMSULOSIN HYDROCHLORIDE SCH MG: 0.4 CAPSULE ORAL at 19:13

## 2018-11-26 RX ADMIN — OXYCODONE HYDROCHLORIDE PRN MG: 5 TABLET ORAL at 12:04

## 2018-11-26 RX ADMIN — BENZTROPINE MESYLATE SCH MG: 1 TABLET ORAL at 19:14

## 2018-11-26 RX ADMIN — LANSOPRAZOLE SCH MG: 30 TABLET, ORALLY DISINTEGRATING, DELAYED RELEASE ORAL at 11:38

## 2018-11-26 RX ADMIN — DIVALPROEX SODIUM SCH MG: 250 TABLET, FILM COATED, EXTENDED RELEASE ORAL at 11:38

## 2018-11-26 NOTE — PDOC PROGRESS REPORT
Subjective


Progress Note for:: 11/26/18


Subjective:: 





Still c/o pain in Right Leg


Reason For Visit: 


FX OF RIGHT LEG








Physical Exam


Vital Signs: 


 











Temp Pulse Resp BP Pulse Ox


 


 98.7 F   89   15   136/79 H  100 


 


 11/26/18 07:55  11/26/18 07:55  11/26/18 07:55  11/26/18 07:55  11/26/18 07:55








 Intake & Output











 11/25/18 11/26/18 11/27/18





 06:59 06:59 06:59


 


Intake Total 829 1265 


 


Output Total 1815 2310 


 


Balance -986 -1045 


 


Weight 62.7 kg 61.8 kg 











General appearance: PRESENT: no acute distress, cooperative


Neck exam: ABSENT: carotid bruit, JVD, lymphadenopathy, thyromegaly


Respiratory exam: PRESENT: clear to auscultation david.  ABSENT: rales, rhonchi, 

wheezes


Cardiovascular exam: PRESENT: RRR.  ABSENT: diastolic murmur, rubs, systolic 

murmur


GI/Abdominal exam: PRESENT: normal bowel sounds, soft.  ABSENT: distended, 

guarding, mass, organolmegaly, rebound, tenderness


Rectal exam: PRESENT: deferred


Musculoskeletal exam: PRESENT: other - Incision site covered with dressing


Neurological exam: PRESENT: alert, awake, oriented to person, oriented to place

, oriented to time


Skin exam: PRESENT: rash, other - RLE





Results


Laboratory Results: 


 





 11/23/18 10:40 





 11/23/18 10:40 








Impressions: 


 





Chest X-Ray  11/15/18 00:00


IMPRESSION:  No acute findings


 








Fluoroscopy  11/15/18 00:00


IMPRESSION:  ORIF tibial fracture.  Refer to operative note for further 

information.


 








Pelvis X-Ray  11/15/18 00:00


IMPRESSION:


 


Postsurgical changes with heterotopic bone formation of the


proximal femurs bilaterally. No radiographic evidence for acute


fracture. Correlate with any history of inability to ambulate.


 


 


 2011 [employer] All Rights Reserved


 








Tibia/Fibula X-Ray  11/15/18 00:00


IMPRESSION:  ORIF tibial fracture.  Refer to operative note for further 

information.


 








Ankle X-Ray  11/15/18 06:35


IMPRESSION:


 


Displaced oblique fractures of the distal fibular and tibial


diaphyses as described above.


 








Head CT  11/15/18 06:35


IMPRESSION:


 


1. There is no evidence of acute intracranial pathology.


2. Stable enlarged ventricles out of proportion to the sulci


which can be seen with normal pressure hydrocephalus.


3. 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


infarcts.


 














Assessment & Plan





- Diagnosis


(1) Fracture of distal fibula


Qualifiers: 


   Fracture type: closed   Fracture morphology: unspecified fracture morphology

   Laterality: right   Qualified Code(s): S82.831A - Other fracture of upper 

and lower end of right fibula, initial encounter for closed fracture   


Is this a current diagnosis for this admission?: Yes   


Plan: 


Ortho recommends pain control, Ambulate with PT and Dc to Rehab when bed 

available








(2) Bipolar 1 disorder


Is this a current diagnosis for this admission?: Yes   


Plan: 


Chronic, cont meds








- Time


Time Spent with patient: 15-24 minutes


Anticipated discharge: Acute Rehab


Within: when bed available





- Inpatient Certification


Based on my medical assessment, after consideration of the patient's 

comorbidities, presenting symptoms, or acuity I expect that the services needed 

warrant INPATIENT care.: Yes


Medical Necessity: Need for Pain Control

## 2018-11-27 LAB
ADD MANUAL DIFF: NO
ANION GAP SERPL CALC-SCNC: 15 MMOL/L (ref 5–19)
BASOPHILS # BLD AUTO: 0.1 10^3/UL (ref 0–0.2)
BASOPHILS NFR BLD AUTO: 0.5 % (ref 0–2)
BUN SERPL-MCNC: 17 MG/DL (ref 7–20)
CALCIUM: 9.4 MG/DL (ref 8.4–10.2)
CHLORIDE SERPL-SCNC: 98 MMOL/L (ref 98–107)
CO2 SERPL-SCNC: 23 MMOL/L (ref 22–30)
EOSINOPHIL # BLD AUTO: 0.1 10^3/UL (ref 0–0.6)
EOSINOPHIL NFR BLD AUTO: 1 % (ref 0–6)
ERYTHROCYTE [DISTWIDTH] IN BLOOD BY AUTOMATED COUNT: 16.1 % (ref 11.5–14)
GLUCOSE SERPL-MCNC: 160 MG/DL (ref 75–110)
HCT VFR BLD CALC: 32.5 % (ref 37.9–51)
HGB BLD-MCNC: 10.9 G/DL (ref 13.5–17)
LYMPHOCYTES # BLD AUTO: 1.9 10^3/UL (ref 0.5–4.7)
LYMPHOCYTES NFR BLD AUTO: 15.3 % (ref 13–45)
MCH RBC QN AUTO: 28.5 PG (ref 27–33.4)
MCHC RBC AUTO-ENTMCNC: 33.6 G/DL (ref 32–36)
MCV RBC AUTO: 85 FL (ref 80–97)
MONOCYTES # BLD AUTO: 0.8 10^3/UL (ref 0.1–1.4)
MONOCYTES NFR BLD AUTO: 6.2 % (ref 3–13)
NEUTROPHILS # BLD AUTO: 9.7 10^3/UL (ref 1.7–8.2)
NEUTS SEG NFR BLD AUTO: 77 % (ref 42–78)
PLATELET # BLD: 266 10^3/UL (ref 150–450)
POTASSIUM SERPL-SCNC: 4.6 MMOL/L (ref 3.6–5)
RBC # BLD AUTO: 3.83 10^6/UL (ref 4.35–5.55)
SODIUM SERPL-SCNC: 135.5 MMOL/L (ref 137–145)
TOTAL CELLS COUNTED % (AUTO): 100 %
WBC # BLD AUTO: 12.7 10^3/UL (ref 4–10.5)

## 2018-11-27 RX ADMIN — DOCUSATE SODIUM SCH MG: 100 CAPSULE, LIQUID FILLED ORAL at 17:35

## 2018-11-27 RX ADMIN — LEVOTHYROXINE SODIUM SCH MG: 25 TABLET ORAL at 09:49

## 2018-11-27 RX ADMIN — TIOTROPIUM BROMIDE SCH CAP: 18 CAPSULE ORAL; RESPIRATORY (INHALATION) at 09:51

## 2018-11-27 RX ADMIN — POTASSIUM CHLORIDE SCH MEQ: 750 TABLET, FILM COATED, EXTENDED RELEASE ORAL at 09:49

## 2018-11-27 RX ADMIN — BACLOFEN PRN MG: 10 TABLET ORAL at 14:15

## 2018-11-27 RX ADMIN — BENZTROPINE MESYLATE SCH MG: 1 TABLET ORAL at 09:51

## 2018-11-27 RX ADMIN — LISINOPRIL SCH MG: 5 TABLET ORAL at 09:50

## 2018-11-27 RX ADMIN — DIVALPROEX SODIUM SCH MG: 250 TABLET, FILM COATED, EXTENDED RELEASE ORAL at 09:50

## 2018-11-27 RX ADMIN — SIMVASTATIN SCH MG: 40 TABLET, FILM COATED ORAL at 23:07

## 2018-11-27 RX ADMIN — OXYCODONE HYDROCHLORIDE PRN MG: 5 TABLET ORAL at 11:45

## 2018-11-27 RX ADMIN — ASPRIN AND EXTENDED-RELEASE DIPYRIDAMOLE SCH CAP.SR: 25; 200 CAPSULE ORAL at 17:35

## 2018-11-27 RX ADMIN — DOCUSATE SODIUM SCH MG: 100 CAPSULE, LIQUID FILLED ORAL at 09:50

## 2018-11-27 RX ADMIN — FLUOXETINE SCH MG: 20 CAPSULE ORAL at 09:51

## 2018-11-27 RX ADMIN — BENZTROPINE MESYLATE SCH MG: 1 TABLET ORAL at 17:35

## 2018-11-27 RX ADMIN — BACLOFEN PRN MG: 10 TABLET ORAL at 23:07

## 2018-11-27 RX ADMIN — BUSPIRONE HYDROCHLORIDE SCH MG: 10 TABLET ORAL at 23:07

## 2018-11-27 RX ADMIN — BUSPIRONE HYDROCHLORIDE SCH MG: 10 TABLET ORAL at 09:50

## 2018-11-27 RX ADMIN — TAMSULOSIN HYDROCHLORIDE SCH MG: 0.4 CAPSULE ORAL at 17:35

## 2018-11-27 RX ADMIN — METOPROLOL SUCCINATE SCH MG: 25 TABLET, EXTENDED RELEASE ORAL at 09:49

## 2018-11-27 RX ADMIN — LANSOPRAZOLE SCH MG: 30 TABLET, ORALLY DISINTEGRATING, DELAYED RELEASE ORAL at 09:49

## 2018-11-27 RX ADMIN — ASPIRIN SCH MG: 81 TABLET, COATED ORAL at 09:49

## 2018-11-27 RX ADMIN — OXYCODONE HYDROCHLORIDE PRN MG: 5 TABLET ORAL at 18:35

## 2018-11-27 RX ADMIN — LEVOFLOXACIN SCH MG: 500 TABLET, FILM COATED ORAL at 11:48

## 2018-11-27 RX ADMIN — ASPRIN AND EXTENDED-RELEASE DIPYRIDAMOLE SCH CAP.SR: 25; 200 CAPSULE ORAL at 09:51

## 2018-11-27 NOTE — PDOC PROGRESS REPORT
Subjective


Progress Note for:: 11/27/18


Subjective:: 





No new complaints today


Reason For Visit: 


FX OF RIGHT LEG








Physical Exam


Vital Signs: 


 











Temp Pulse Resp BP Pulse Ox


 


 98.9 F   83   20   104/67   100 


 


 11/27/18 15:35  11/27/18 15:35  11/27/18 15:35  11/27/18 15:35  11/27/18 15:35








 Intake & Output











 11/26/18 11/27/18 11/28/18





 06:59 06:59 06:59


 


Intake Total 1265 1065 591


 


Output Total 2310 1200 


 


Balance -1045 -135 591


 


Weight 61.8 kg 61.8 kg 











General appearance: PRESENT: no acute distress


Head exam: PRESENT: atraumatic, normocephalic


Eye exam: PRESENT: conjunctiva pink, EOMI, PERRLA.  ABSENT: scleral icterus


Ear exam: PRESENT: normal external ear exam


Mouth exam: PRESENT: moist, tongue midline


Neck exam: ABSENT: carotid bruit, JVD, lymphadenopathy, thyromegaly


Respiratory exam: PRESENT: clear to auscultation david.  ABSENT: rales, rhonchi, 

wheezes


Cardiovascular exam: PRESENT: RRR.  ABSENT: diastolic murmur, rubs, systolic 

murmur


Pulses: PRESENT: normal dorsalis pedis pul


Vascular exam: PRESENT: normal capillary refill


GI/Abdominal exam: PRESENT: normal bowel sounds, soft.  ABSENT: distended, 

guarding, mass, organolmegaly, rebound, tenderness


Rectal exam: PRESENT: deferred


Extremities exam: PRESENT: full ROM.  ABSENT: calf tenderness, clubbing, pedal 

edema


Musculoskeletal exam: PRESENT: ambulatory, other - R foot dressing with serous 

discharge


Neurological exam: PRESENT: alert, awake, oriented to person, oriented to place

, oriented to time, oriented to situation, CN II-XII grossly intact.  ABSENT: 

motor sensory deficit


Psychiatric exam: PRESENT: appropriate affect, normal mood.  ABSENT: homicidal 

ideation, suicidal ideation


Skin exam: PRESENT: dry, warm.  ABSENT: cyanosis, rash





Results


Laboratory Results: 


 





 11/27/18 06:00 





 11/27/18 06:00 





 











  11/27/18 11/27/18





  06:00 06:00


 


WBC  12.7 H 


 


RBC  3.83 L 


 


Hgb  10.9 L 


 


Hct  32.5 L 


 


MCV  85 


 


MCH  28.5 


 


MCHC  33.6 


 


RDW  16.1 H 


 


Plt Count  266 


 


Seg Neutrophils %  77.0 


 


Lymphocytes %  15.3 


 


Monocytes %  6.2 


 


Eosinophils %  1.0 


 


Basophils %  0.5 


 


Absolute Neutrophils  9.7 H 


 


Absolute Lymphocytes  1.9 


 


Absolute Monocytes  0.8 


 


Absolute Eosinophils  0.1 


 


Absolute Basophils  0.1 


 


Sodium   135.5 L


 


Potassium   4.6


 


Chloride   98


 


Carbon Dioxide   23


 


Anion Gap   15


 


BUN   17


 


Creatinine   1.18


 


Est GFR ( Amer)   > 60


 


Est GFR (Non-Af Amer)   > 60


 


Glucose   160 H


 


Calcium   9.4











Impressions: 


 





Chest X-Ray  11/15/18 00:00


IMPRESSION:  No acute findings


 








Fluoroscopy  11/15/18 00:00


IMPRESSION:  ORIF tibial fracture.  Refer to operative note for further 

information.


 








Pelvis X-Ray  11/15/18 00:00


IMPRESSION:


 


Postsurgical changes with heterotopic bone formation of the


proximal femurs bilaterally. No radiographic evidence for acute


fracture. Correlate with any history of inability to ambulate.


 


 


 2011 [employer]- All Rights Reserved


 








Tibia/Fibula X-Ray  11/15/18 00:00


IMPRESSION:  ORIF tibial fracture.  Refer to operative note for further 

information.


 








Ankle X-Ray  11/15/18 06:35


IMPRESSION:


 


Displaced oblique fractures of the distal fibular and tibial


diaphyses as described above.


 








Head CT  11/15/18 06:35


IMPRESSION:


 


1. There is no evidence of acute intracranial pathology.


2. Stable enlarged ventricles out of proportion to the sulci


which can be seen with normal pressure hydrocephalus.


3. 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


infarcts.


 














Assessment & Plan





- Diagnosis


(1) Fracture of distal fibula


Qualifiers: 


   Fracture type: closed   Fracture morphology: unspecified fracture morphology

   Laterality: right   Qualified Code(s): S82.831A - Other fracture of upper 

and lower end of right fibula, initial encounter for closed fracture   


Is this a current diagnosis for this admission?: Yes   


Plan: 


Ortho recommends pain control, Ambulate with PT and Dc to Rehab when bed 

available








(2) Bipolar 1 disorder


Is this a current diagnosis for this admission?: Yes   





- Time


Time Spent with patient: 15-24 minutes


Medications reviewed and adjusted accordingly: Yes


Anticipated discharge: Acute Rehab


Within: within 48 hours





- Inpatient Certification


Based on my medical assessment, after consideration of the patient's 

comorbidities, presenting symptoms, or acuity I expect that the services needed 

warrant INPATIENT care.: Yes


Medical Necessity: Need Close Monitoring Due to Risk of Patient Decompensation

## 2018-11-28 RX ADMIN — DOCUSATE SODIUM SCH MG: 100 CAPSULE, LIQUID FILLED ORAL at 11:37

## 2018-11-28 RX ADMIN — ASPRIN AND EXTENDED-RELEASE DIPYRIDAMOLE SCH CAP.SR: 25; 200 CAPSULE ORAL at 18:06

## 2018-11-28 RX ADMIN — DIVALPROEX SODIUM SCH MG: 250 TABLET, FILM COATED, EXTENDED RELEASE ORAL at 11:26

## 2018-11-28 RX ADMIN — LANSOPRAZOLE SCH MG: 30 TABLET, ORALLY DISINTEGRATING, DELAYED RELEASE ORAL at 11:24

## 2018-11-28 RX ADMIN — FLUOXETINE SCH MG: 20 CAPSULE ORAL at 11:27

## 2018-11-28 RX ADMIN — ASPRIN AND EXTENDED-RELEASE DIPYRIDAMOLE SCH CAP.SR: 25; 200 CAPSULE ORAL at 11:27

## 2018-11-28 RX ADMIN — INSULIN LISPRO PRN UNIT: 100 INJECTION, SOLUTION INTRAVENOUS; SUBCUTANEOUS at 21:58

## 2018-11-28 RX ADMIN — POTASSIUM CHLORIDE SCH MEQ: 750 TABLET, FILM COATED, EXTENDED RELEASE ORAL at 11:24

## 2018-11-28 RX ADMIN — BUSPIRONE HYDROCHLORIDE SCH MG: 10 TABLET ORAL at 21:51

## 2018-11-28 RX ADMIN — TIOTROPIUM BROMIDE SCH CAP: 18 CAPSULE ORAL; RESPIRATORY (INHALATION) at 11:28

## 2018-11-28 RX ADMIN — BUSPIRONE HYDROCHLORIDE SCH MG: 10 TABLET ORAL at 11:26

## 2018-11-28 RX ADMIN — INSULIN LISPRO PRN UNIT: 100 INJECTION, SOLUTION INTRAVENOUS; SUBCUTANEOUS at 15:04

## 2018-11-28 RX ADMIN — LISINOPRIL SCH MG: 5 TABLET ORAL at 11:24

## 2018-11-28 RX ADMIN — LEVOTHYROXINE SODIUM SCH MG: 25 TABLET ORAL at 05:52

## 2018-11-28 RX ADMIN — BENZTROPINE MESYLATE SCH MG: 1 TABLET ORAL at 18:05

## 2018-11-28 RX ADMIN — TAMSULOSIN HYDROCHLORIDE SCH MG: 0.4 CAPSULE ORAL at 18:05

## 2018-11-28 RX ADMIN — SIMVASTATIN SCH MG: 40 TABLET, FILM COATED ORAL at 21:51

## 2018-11-28 RX ADMIN — METOPROLOL SUCCINATE SCH MG: 25 TABLET, EXTENDED RELEASE ORAL at 11:25

## 2018-11-28 RX ADMIN — DOCUSATE SODIUM SCH MG: 100 CAPSULE, LIQUID FILLED ORAL at 18:05

## 2018-11-28 RX ADMIN — BENZTROPINE MESYLATE SCH MG: 1 TABLET ORAL at 11:27

## 2018-11-28 RX ADMIN — OXYCODONE HYDROCHLORIDE PRN MG: 5 TABLET ORAL at 14:25

## 2018-11-28 RX ADMIN — OXYCODONE HYDROCHLORIDE PRN MG: 5 TABLET ORAL at 21:51

## 2018-11-28 NOTE — PDOC PROGRESS REPORT
Subjective


Progress Note for:: 11/28/18


Subjective:: 





No new complaints today, awaitng Rehab placement


Reason For Visit: 


FX OF RIGHT LEG








Physical Exam


Vital Signs: 


 











Temp Pulse Resp BP Pulse Ox


 


 98.4 F   87   16   110/67   100 


 


 11/28/18 07:37  11/28/18 07:37  11/28/18 07:37  11/28/18 07:37  11/28/18 07:37








 Intake & Output











 11/27/18 11/28/18 11/29/18





 06:59 06:59 06:59


 


Intake Total 1065 1146 


 


Output Total 1200 900 


 


Balance -135 246 


 


Weight 61.8 kg 61.8 kg 











General appearance: PRESENT: no acute distress


Head exam: PRESENT: atraumatic, normocephalic


Eye exam: PRESENT: conjunctiva pink, EOMI, PERRLA.  ABSENT: scleral icterus


Ear exam: PRESENT: normal external ear exam


Mouth exam: PRESENT: moist, tongue midline


Neck exam: ABSENT: carotid bruit, JVD, lymphadenopathy, thyromegaly


Respiratory exam: PRESENT: clear to auscultation david.  ABSENT: rales, rhonchi, 

wheezes


Cardiovascular exam: PRESENT: RRR.  ABSENT: diastolic murmur, rubs, systolic 

murmur


Pulses: PRESENT: normal dorsalis pedis pul


Vascular exam: PRESENT: normal capillary refill


GI/Abdominal exam: PRESENT: normal bowel sounds, soft.  ABSENT: distended, 

guarding, mass, organolmegaly, rebound, tenderness


Rectal exam: PRESENT: deferred


Extremities exam: PRESENT: full ROM.  ABSENT: calf tenderness, clubbing, pedal 

edema


Musculoskeletal exam: PRESENT: other - RLE dressing


Neurological exam: PRESENT: alert, awake, oriented to person, oriented to place

, oriented to time, oriented to situation, CN II-XII grossly intact.  ABSENT: 

motor sensory deficit


Psychiatric exam: PRESENT: appropriate affect, normal mood.  ABSENT: homicidal 

ideation, suicidal ideation


Skin exam: PRESENT: rash, warm.  ABSENT: cyanosis





Results


Laboratory Results: 


 





 11/27/18 06:00 





 11/27/18 06:00 








Impressions: 


 





Chest X-Ray  11/15/18 00:00


IMPRESSION:  No acute findings


 








Fluoroscopy  11/15/18 00:00


IMPRESSION:  ORIF tibial fracture.  Refer to operative note for further 

information.


 








Pelvis X-Ray  11/15/18 00:00


IMPRESSION:


 


Postsurgical changes with heterotopic bone formation of the


proximal femurs bilaterally. No radiographic evidence for acute


fracture. Correlate with any history of inability to ambulate.


 


 


 2011 [employer]- All Rights Reserved


 








Tibia/Fibula X-Ray  11/15/18 00:00


IMPRESSION:  ORIF tibial fracture.  Refer to operative note for further 

information.


 








Ankle X-Ray  11/15/18 06:35


IMPRESSION:


 


Displaced oblique fractures of the distal fibular and tibial


diaphyses as described above.


 








Head CT  11/15/18 06:35


IMPRESSION:


 


1. There is no evidence of acute intracranial pathology.


2. Stable enlarged ventricles out of proportion to the sulci


which can be seen with normal pressure hydrocephalus.


3. 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


infarcts.


 














Assessment & Plan





- Diagnosis


(1) Fracture of distal fibula


Qualifiers: 


   Fracture type: closed   Fracture morphology: unspecified fracture morphology

   Laterality: right   Qualified Code(s): S82.831A - Other fracture of upper 

and lower end of right fibula, initial encounter for closed fracture   


Is this a current diagnosis for this admission?: Yes   





(2) Bipolar 1 disorder


Is this a current diagnosis for this admission?: Yes   





- Time


Time Spent with patient: Less than 15 minutes


Medications reviewed and adjusted accordingly: Yes


Anticipated discharge: Acute Rehab


Within: when bed available





- Inpatient Certification


Based on my medical assessment, after consideration of the patient's 

comorbidities, presenting symptoms, or acuity I expect that the services needed 

warrant INPATIENT care.: Yes


Medical Necessity: Need Close Monitoring Due to Risk of Patient Decompensation, 

Risk of Complication if Not Cared For in Hospital

## 2018-11-29 RX ADMIN — BENZTROPINE MESYLATE SCH MG: 1 TABLET ORAL at 17:19

## 2018-11-29 RX ADMIN — TAMSULOSIN HYDROCHLORIDE SCH MG: 0.4 CAPSULE ORAL at 17:19

## 2018-11-29 RX ADMIN — LISINOPRIL SCH MG: 5 TABLET ORAL at 09:38

## 2018-11-29 RX ADMIN — FLUOXETINE SCH MG: 20 CAPSULE ORAL at 09:38

## 2018-11-29 RX ADMIN — DOCUSATE SODIUM SCH MG: 100 CAPSULE, LIQUID FILLED ORAL at 09:38

## 2018-11-29 RX ADMIN — BENZTROPINE MESYLATE SCH MG: 1 TABLET ORAL at 09:38

## 2018-11-29 RX ADMIN — BACLOFEN PRN MG: 10 TABLET ORAL at 09:50

## 2018-11-29 RX ADMIN — DIVALPROEX SODIUM SCH MG: 250 TABLET, FILM COATED, EXTENDED RELEASE ORAL at 09:38

## 2018-11-29 RX ADMIN — TIOTROPIUM BROMIDE SCH CAP: 18 CAPSULE ORAL; RESPIRATORY (INHALATION) at 09:38

## 2018-11-29 RX ADMIN — OXYCODONE HYDROCHLORIDE PRN MG: 5 TABLET ORAL at 09:50

## 2018-11-29 RX ADMIN — METOPROLOL SUCCINATE SCH MG: 25 TABLET, EXTENDED RELEASE ORAL at 09:37

## 2018-11-29 RX ADMIN — INSULIN LISPRO PRN UNIT: 100 INJECTION, SOLUTION INTRAVENOUS; SUBCUTANEOUS at 17:19

## 2018-11-29 RX ADMIN — BUSPIRONE HYDROCHLORIDE SCH MG: 10 TABLET ORAL at 09:38

## 2018-11-29 RX ADMIN — ASPRIN AND EXTENDED-RELEASE DIPYRIDAMOLE SCH CAP.SR: 25; 200 CAPSULE ORAL at 09:38

## 2018-11-29 RX ADMIN — ACETAMINOPHEN PRN MG: 325 TABLET ORAL at 21:42

## 2018-11-29 RX ADMIN — LANSOPRAZOLE SCH MG: 30 TABLET, ORALLY DISINTEGRATING, DELAYED RELEASE ORAL at 09:37

## 2018-11-29 RX ADMIN — OXCARBAZEPINE SCH MG: 150 TABLET, FILM COATED ORAL at 21:42

## 2018-11-29 RX ADMIN — OXYCODONE HYDROCHLORIDE PRN MG: 5 TABLET ORAL at 15:56

## 2018-11-29 RX ADMIN — POTASSIUM CHLORIDE SCH MEQ: 750 TABLET, FILM COATED, EXTENDED RELEASE ORAL at 09:38

## 2018-11-29 RX ADMIN — SIMVASTATIN SCH MG: 40 TABLET, FILM COATED ORAL at 21:42

## 2018-11-29 RX ADMIN — CYCLOBENZAPRINE HYDROCHLORIDE PRN MG: 10 TABLET, FILM COATED ORAL at 13:47

## 2018-11-29 RX ADMIN — ASPRIN AND EXTENDED-RELEASE DIPYRIDAMOLE SCH CAP.SR: 25; 200 CAPSULE ORAL at 17:19

## 2018-11-29 RX ADMIN — LEVOTHYROXINE SODIUM SCH MG: 25 TABLET ORAL at 05:55

## 2018-11-29 RX ADMIN — BUSPIRONE HYDROCHLORIDE SCH MG: 10 TABLET ORAL at 21:42

## 2018-11-29 RX ADMIN — DOCUSATE SODIUM SCH MG: 100 CAPSULE, LIQUID FILLED ORAL at 17:19

## 2018-11-29 NOTE — PDOC PROGRESS REPORT
Subjective


Progress Note for:: 11/29/18


Subjective:: 





C/o pain RLE, awaitng Rehab placement


Reason For Visit: 


FX OF RIGHT LEG








Physical Exam


Vital Signs: 


 











Temp Pulse Resp BP Pulse Ox


 


 98.6 F   91   17   123/76   100 


 


 11/29/18 08:00  11/29/18 08:00  11/29/18 08:00  11/29/18 08:00  11/29/18 08:00








 Intake & Output











 11/28/18 11/29/18 11/30/18





 06:59 06:59 06:59


 


Intake Total 1146 899 


 


Output Total 900 1400 


 


Balance 246 -501 


 


Weight 61.8 kg 62.2 kg 











General appearance: PRESENT: no acute distress, well-nourished


Head exam: PRESENT: atraumatic, normocephalic


Eye exam: PRESENT: conjunctiva pink, EOMI, PERRLA.  ABSENT: scleral icterus


Ear exam: PRESENT: normal external ear exam


Mouth exam: PRESENT: moist, tongue midline


Neck exam: ABSENT: carotid bruit, JVD, lymphadenopathy, thyromegaly


Respiratory exam: PRESENT: clear to auscultation david.  ABSENT: rales, rhonchi, 

wheezes


Cardiovascular exam: PRESENT: RRR.  ABSENT: diastolic murmur, rubs, systolic 

murmur


Pulses: PRESENT: normal dorsalis pedis pul


Vascular exam: PRESENT: normal capillary refill


GI/Abdominal exam: PRESENT: normal bowel sounds, soft.  ABSENT: distended, 

guarding, mass, organolmegaly, rebound, tenderness


Rectal exam: PRESENT: deferred


Extremities exam: PRESENT: full ROM, other - RLE incision covered with dressing,

.  ABSENT: calf tenderness, clubbing, pedal edema


Neurological exam: PRESENT: alert, awake, oriented to person, oriented to place

, oriented to time, oriented to situation, CN II-XII grossly intact.  ABSENT: 

motor sensory deficit


Psychiatric exam: PRESENT: appropriate affect, normal mood.  ABSENT: homicidal 

ideation, suicidal ideation


Skin exam: PRESENT: dry, intact, warm.  ABSENT: cyanosis, rash





Results


Laboratory Results: 


 





 11/27/18 06:00 





 11/27/18 06:00 








Impressions: 


 





Chest X-Ray  11/15/18 00:00


IMPRESSION:  No acute findings


 








Fluoroscopy  11/15/18 00:00


IMPRESSION:  ORIF tibial fracture.  Refer to operative note for further 

information.


 








Pelvis X-Ray  11/15/18 00:00


IMPRESSION:


 


Postsurgical changes with heterotopic bone formation of the


proximal femurs bilaterally. No radiographic evidence for acute


fracture. Correlate with any history of inability to ambulate.


 


 


 2011 [employer] All Rights Reserved


 








Tibia/Fibula X-Ray  11/15/18 00:00


IMPRESSION:  ORIF tibial fracture.  Refer to operative note for further 

information.


 








Ankle X-Ray  11/15/18 06:35


IMPRESSION:


 


Displaced oblique fractures of the distal fibular and tibial


diaphyses as described above.


 








Head CT  11/15/18 06:35


IMPRESSION:


 


1. There is no evidence of acute intracranial pathology.


2. Stable enlarged ventricles out of proportion to the sulci


which can be seen with normal pressure hydrocephalus.


3. 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


infarcts.


 














Assessment & Plan





- Diagnosis


(1) Fracture of distal fibula


Qualifiers: 


   Fracture type: closed   Fracture morphology: unspecified fracture morphology

   Laterality: right   Qualified Code(s): S82.831A - Other fracture of upper 

and lower end of right fibula, initial encounter for closed fracture   


Is this a current diagnosis for this admission?: Yes   


Plan: 


Ortho recommends pain control, Ambulate with PT and Dc to Rehab when bed 

available








(2) Bipolar 1 disorder


Is this a current diagnosis for this admission?: Yes   





- Time


Time Spent with patient: Less than 15 minutes


Medications reviewed and adjusted accordingly: Yes


Anticipated discharge: Acute Rehab


Within: when bed available

## 2018-11-30 LAB
ADD MANUAL DIFF: NO
BASOPHILS # BLD AUTO: 0.1 10^3/UL (ref 0–0.2)
BASOPHILS NFR BLD AUTO: 0.9 % (ref 0–2)
EOSINOPHIL # BLD AUTO: 0.2 10^3/UL (ref 0–0.6)
EOSINOPHIL NFR BLD AUTO: 2 % (ref 0–6)
ERYTHROCYTE [DISTWIDTH] IN BLOOD BY AUTOMATED COUNT: 16.1 % (ref 11.5–14)
HCT VFR BLD CALC: 32.1 % (ref 37.9–51)
HGB BLD-MCNC: 10.7 G/DL (ref 13.5–17)
LYMPHOCYTES # BLD AUTO: 2.1 10^3/UL (ref 0.5–4.7)
LYMPHOCYTES NFR BLD AUTO: 19.3 % (ref 13–45)
MCH RBC QN AUTO: 28.6 PG (ref 27–33.4)
MCHC RBC AUTO-ENTMCNC: 33.4 G/DL (ref 32–36)
MCV RBC AUTO: 86 FL (ref 80–97)
MONOCYTES # BLD AUTO: 0.7 10^3/UL (ref 0.1–1.4)
MONOCYTES NFR BLD AUTO: 6.5 % (ref 3–13)
NEUTROPHILS # BLD AUTO: 7.8 10^3/UL (ref 1.7–8.2)
NEUTS SEG NFR BLD AUTO: 71.3 % (ref 42–78)
PLATELET # BLD: 309 10^3/UL (ref 150–450)
RBC # BLD AUTO: 3.76 10^6/UL (ref 4.35–5.55)
TOTAL CELLS COUNTED % (AUTO): 100 %
WBC # BLD AUTO: 10.9 10^3/UL (ref 4–10.5)

## 2018-11-30 RX ADMIN — TAMSULOSIN HYDROCHLORIDE SCH MG: 0.4 CAPSULE ORAL at 17:30

## 2018-11-30 RX ADMIN — OXCARBAZEPINE SCH MG: 150 TABLET, FILM COATED ORAL at 10:14

## 2018-11-30 RX ADMIN — CYCLOBENZAPRINE HYDROCHLORIDE PRN MG: 10 TABLET, FILM COATED ORAL at 11:22

## 2018-11-30 RX ADMIN — FLUOXETINE SCH MG: 20 CAPSULE ORAL at 10:12

## 2018-11-30 RX ADMIN — OXYCODONE HYDROCHLORIDE PRN MG: 5 TABLET ORAL at 17:30

## 2018-11-30 RX ADMIN — BENZTROPINE MESYLATE SCH MG: 1 TABLET ORAL at 10:13

## 2018-11-30 RX ADMIN — BENZTROPINE MESYLATE SCH MG: 1 TABLET ORAL at 17:30

## 2018-11-30 RX ADMIN — BUSPIRONE HYDROCHLORIDE SCH MG: 10 TABLET ORAL at 21:08

## 2018-11-30 RX ADMIN — ASPRIN AND EXTENDED-RELEASE DIPYRIDAMOLE SCH CAP.SR: 25; 200 CAPSULE ORAL at 10:12

## 2018-11-30 RX ADMIN — OXYCODONE HYDROCHLORIDE PRN MG: 5 TABLET ORAL at 11:22

## 2018-11-30 RX ADMIN — DOCUSATE SODIUM SCH MG: 100 CAPSULE, LIQUID FILLED ORAL at 10:11

## 2018-11-30 RX ADMIN — ASPRIN AND EXTENDED-RELEASE DIPYRIDAMOLE SCH CAP.SR: 25; 200 CAPSULE ORAL at 17:30

## 2018-11-30 RX ADMIN — LISINOPRIL SCH MG: 5 TABLET ORAL at 10:10

## 2018-11-30 RX ADMIN — OXCARBAZEPINE SCH MG: 150 TABLET, FILM COATED ORAL at 21:08

## 2018-11-30 RX ADMIN — METOPROLOL SUCCINATE SCH MG: 25 TABLET, EXTENDED RELEASE ORAL at 10:11

## 2018-11-30 RX ADMIN — LANSOPRAZOLE SCH MG: 30 TABLET, ORALLY DISINTEGRATING, DELAYED RELEASE ORAL at 10:12

## 2018-11-30 RX ADMIN — BACLOFEN PRN MG: 10 TABLET ORAL at 13:37

## 2018-11-30 RX ADMIN — SIMVASTATIN SCH MG: 40 TABLET, FILM COATED ORAL at 21:08

## 2018-11-30 RX ADMIN — BACLOFEN PRN MG: 10 TABLET ORAL at 21:15

## 2018-11-30 RX ADMIN — BUSPIRONE HYDROCHLORIDE SCH MG: 10 TABLET ORAL at 10:11

## 2018-11-30 RX ADMIN — DOCUSATE SODIUM SCH: 100 CAPSULE, LIQUID FILLED ORAL at 17:31

## 2018-11-30 RX ADMIN — DIVALPROEX SODIUM SCH MG: 250 TABLET, FILM COATED, EXTENDED RELEASE ORAL at 10:12

## 2018-11-30 RX ADMIN — INSULIN LISPRO PRN UNIT: 100 INJECTION, SOLUTION INTRAVENOUS; SUBCUTANEOUS at 11:25

## 2018-11-30 RX ADMIN — LEVOTHYROXINE SODIUM SCH: 25 TABLET ORAL at 05:50

## 2018-11-30 RX ADMIN — POTASSIUM CHLORIDE SCH MEQ: 750 TABLET, FILM COATED, EXTENDED RELEASE ORAL at 10:13

## 2018-11-30 RX ADMIN — TIOTROPIUM BROMIDE SCH CAP: 18 CAPSULE ORAL; RESPIRATORY (INHALATION) at 10:13

## 2018-11-30 NOTE — PDOC PROGRESS REPORT
Subjective


Progress Note for:: 11/30/18


Subjective:: 





Patient is currently awaiting placement placement.  Discussed with [facility] today 

regarding placement and it appears he has been rejected by the once they were 

evaluating him.  Another attempt should be made to see if any skilled nursing 

facility will accept him hopefully by early next week.  If this is not feasible 

patient will have to go home with his family/caretaker.


He is status post right tibial nail insertion on November 15 for a right leg 

fracture


Reason For Visit: 


FX OF RIGHT LEG








Physical Exam


Vital Signs: 


 











Temp Pulse Resp BP Pulse Ox


 


 98.4 F   86   17   104/67   100 


 


 11/30/18 11:20  11/30/18 11:20  11/30/18 11:20  11/30/18 11:20  11/30/18 11:20








 Intake & Output











 11/29/18 11/30/18 12/01/18





 06:59 06:59 06:59


 


Intake Total 899 1851 


 


Output Total 1400 1500 


 


Balance -501 351 


 


Weight 62.2 kg 61 kg 











General appearance: PRESENT: no acute distress


Head exam: PRESENT: atraumatic, normocephalic


Eye exam: PRESENT: conjunctiva pink, EOMI, PERRLA.  ABSENT: scleral icterus


Ear exam: PRESENT: normal external ear exam


Mouth exam: PRESENT: moist, tongue midline


Neck exam: ABSENT: carotid bruit, JVD, lymphadenopathy, thyromegaly


Respiratory exam: PRESENT: clear to auscultation david.  ABSENT: rales, rhonchi, 

wheezes


Cardiovascular exam: PRESENT: RRR, +S1, +S2.  ABSENT: diastolic murmur, rubs, 

systolic murmur


Pulses: PRESENT: normal dorsalis pedis pul


Vascular exam: PRESENT: normal capillary refill


GI/Abdominal exam: PRESENT: normal bowel sounds, soft.  ABSENT: distended, 

guarding, mass, organolmegaly, rebound, tenderness


Rectal exam: PRESENT: deferred


Extremities exam: PRESENT: tenderness - RLE post leg with mild erythema.  ABSENT

: calf tenderness, clubbing, pedal edema


Neurological exam: PRESENT: alert, awake, oriented to person, oriented to place

, CN II-XII grossly intact.  ABSENT: motor sensory deficit


Psychiatric exam: PRESENT: appropriate affect.  ABSENT: homicidal ideation, 

suicidal ideation


Skin exam: PRESENT: dry, intact, rash, warm.  ABSENT: cyanosis





Results


Laboratory Results: 


 





 11/30/18 04:20 





 11/27/18 06:00 





 











  11/30/18





  04:20


 


WBC  10.9 H


 


RBC  3.76 L


 


Hgb  10.7 L


 


Hct  32.1 L


 


MCV  86


 


MCH  28.6


 


MCHC  33.4


 


RDW  16.1 H


 


Plt Count  309


 


Seg Neutrophils %  71.3


 


Lymphocytes %  19.3


 


Monocytes %  6.5


 


Eosinophils %  2.0


 


Basophils %  0.9


 


Absolute Neutrophils  7.8


 


Absolute Lymphocytes  2.1


 


Absolute Monocytes  0.7


 


Absolute Eosinophils  0.2


 


Absolute Basophils  0.1











Impressions: 


 





Chest X-Ray  11/15/18 00:00


IMPRESSION:  No acute findings


 








Fluoroscopy  11/15/18 00:00


IMPRESSION:  ORIF tibial fracture.  Refer to operative note for further 

information.


 








Pelvis X-Ray  11/15/18 00:00


IMPRESSION:


 


Postsurgical changes with heterotopic bone formation of the


proximal femurs bilaterally. No radiographic evidence for acute


fracture. Correlate with any history of inability to ambulate.


 


 


 2011 [employer]- All Rights Reserved


 








Tibia/Fibula X-Ray  11/15/18 00:00


IMPRESSION:  ORIF tibial fracture.  Refer to operative note for further 

information.


 








Ankle X-Ray  11/15/18 06:35


IMPRESSION:


 


Displaced oblique fractures of the distal fibular and tibial


diaphyses as described above.


 








Head CT  11/15/18 06:35


IMPRESSION:


 


1. There is no evidence of acute intracranial pathology.


2. Stable enlarged ventricles out of proportion to the sulci


which can be seen with normal pressure hydrocephalus.


3. 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


infarcts.


 














Assessment & Plan





- Diagnosis


(1) Fracture of distal fibula


Qualifiers: 


   Fracture type: closed   Fracture morphology: unspecified fracture morphology

   Laterality: right   Qualified Code(s): S82.831A - Other fracture of upper 

and lower end of right fibula, initial encounter for closed fracture   


Is this a current diagnosis for this admission?: Yes   


Plan: 


Continue pain control, Ambulate with PT and Dc to Rehab when bed available 

otherwise patient may need to go home with his family








(2) Bipolar 1 disorder


Is this a current diagnosis for this admission?: Yes   


Plan: 


Chronic, cont meds








(3) Anemia


Qualifiers: 


   Anemia type: iron deficiency   Iron deficiency anemia type: inadequate 

dietary iron intake   Qualified Code(s): D50.8 - Other iron deficiency anemias 

  


Is this a current diagnosis for this admission?: Yes   


Plan: 


Chronic and stable








- Time


Time Spent with patient: 15-24 minutes


Medications reviewed and adjusted accordingly: Yes


Anticipated discharge: SNF


Within: within 48 hours





- Inpatient Certification


Based on my medical assessment, after consideration of the patient's 

comorbidities, presenting symptoms, or acuity I expect that the services needed 

warrant INPATIENT care.: Yes


Medical Necessity: Other - Awaiting bed at nursing facility

## 2018-12-01 RX ADMIN — INSULIN LISPRO PRN UNIT: 100 INJECTION, SOLUTION INTRAVENOUS; SUBCUTANEOUS at 08:40

## 2018-12-01 RX ADMIN — DIVALPROEX SODIUM SCH MG: 250 TABLET, FILM COATED, EXTENDED RELEASE ORAL at 09:44

## 2018-12-01 RX ADMIN — DOCUSATE SODIUM SCH: 100 CAPSULE, LIQUID FILLED ORAL at 18:12

## 2018-12-01 RX ADMIN — BENZTROPINE MESYLATE SCH MG: 1 TABLET ORAL at 09:47

## 2018-12-01 RX ADMIN — INSULIN GLARGINE SCH UNITS: 100 INJECTION, SOLUTION SUBCUTANEOUS at 21:27

## 2018-12-01 RX ADMIN — OXCARBAZEPINE SCH MG: 150 TABLET, FILM COATED ORAL at 09:47

## 2018-12-01 RX ADMIN — SIMVASTATIN SCH MG: 40 TABLET, FILM COATED ORAL at 21:28

## 2018-12-01 RX ADMIN — TAMSULOSIN HYDROCHLORIDE SCH MG: 0.4 CAPSULE ORAL at 18:12

## 2018-12-01 RX ADMIN — INSULIN LISPRO PRN UNIT: 100 INJECTION, SOLUTION INTRAVENOUS; SUBCUTANEOUS at 11:50

## 2018-12-01 RX ADMIN — BENZTROPINE MESYLATE SCH MG: 1 TABLET ORAL at 18:12

## 2018-12-01 RX ADMIN — POTASSIUM CHLORIDE SCH MEQ: 750 TABLET, FILM COATED, EXTENDED RELEASE ORAL at 09:45

## 2018-12-01 RX ADMIN — BUSPIRONE HYDROCHLORIDE SCH MG: 10 TABLET ORAL at 21:28

## 2018-12-01 RX ADMIN — OXCARBAZEPINE SCH MG: 150 TABLET, FILM COATED ORAL at 21:28

## 2018-12-01 RX ADMIN — CYCLOBENZAPRINE HYDROCHLORIDE PRN MG: 10 TABLET, FILM COATED ORAL at 21:28

## 2018-12-01 RX ADMIN — METOPROLOL SUCCINATE SCH MG: 25 TABLET, EXTENDED RELEASE ORAL at 09:44

## 2018-12-01 RX ADMIN — DOCUSATE SODIUM SCH MG: 100 CAPSULE, LIQUID FILLED ORAL at 09:44

## 2018-12-01 RX ADMIN — ASPRIN AND EXTENDED-RELEASE DIPYRIDAMOLE SCH CAP.SR: 25; 200 CAPSULE ORAL at 18:12

## 2018-12-01 RX ADMIN — ASPRIN AND EXTENDED-RELEASE DIPYRIDAMOLE SCH CAP.SR: 25; 200 CAPSULE ORAL at 09:47

## 2018-12-01 RX ADMIN — LISINOPRIL SCH MG: 5 TABLET ORAL at 09:45

## 2018-12-01 RX ADMIN — LEVOTHYROXINE SODIUM SCH MG: 25 TABLET ORAL at 05:54

## 2018-12-01 RX ADMIN — BUSPIRONE HYDROCHLORIDE SCH MG: 10 TABLET ORAL at 09:44

## 2018-12-01 RX ADMIN — LANSOPRAZOLE SCH MG: 30 TABLET, ORALLY DISINTEGRATING, DELAYED RELEASE ORAL at 09:44

## 2018-12-01 RX ADMIN — TIOTROPIUM BROMIDE SCH INHALER: 18 CAPSULE ORAL; RESPIRATORY (INHALATION) at 09:46

## 2018-12-01 RX ADMIN — FLUOXETINE SCH MG: 20 CAPSULE ORAL at 09:47

## 2018-12-01 RX ADMIN — BACLOFEN PRN MG: 10 TABLET ORAL at 12:07

## 2018-12-01 RX ADMIN — OXYCODONE HYDROCHLORIDE PRN MG: 5 TABLET ORAL at 19:30

## 2018-12-01 NOTE — PDOC PROGRESS REPORT
Subjective


Progress Note for:: 12/01/18


Subjective:: 





Patient complaining of right leg pain.  No acute issues.


Reason For Visit: 


FX OF RIGHT LEG








Physical Exam


Vital Signs: 


 











Temp Pulse Resp BP Pulse Ox


 


 36.4 C   96   17   118/78   100 


 


 12/01/18 11:46  12/01/18 11:46  12/01/18 11:46  12/01/18 11:46  12/01/18 11:46








 Intake & Output











 11/30/18 12/01/18 12/02/18





 06:59 06:59 06:59


 


Intake Total 1851 814 920


 


Output Total 1500 601 400


 


Balance 351 213 520


 


Weight 61 kg  











Adult Front & Back Image: 


  __________________________














  __________________________





 1 - Proximal incision is dry clean and intact with intact staples.  This 

incision has dressing for some serous oozing.  Today dressing was removed and 

there was no oozing in fact it looks like it is dried up and starting to heal.  

Swelling has improved also.  He is neurovascular intact distally.








Results


Laboratory Results: 


 





 11/30/18 04:20 





 11/27/18 06:00 








Impressions: 


 





Chest X-Ray  11/15/18 00:00


IMPRESSION:  No acute findings


 








Fluoroscopy  11/15/18 00:00


IMPRESSION:  ORIF tibial fracture.  Refer to operative note for further 

information.


 








Pelvis X-Ray  11/15/18 00:00


IMPRESSION:


 


Postsurgical changes with heterotopic bone formation of the


proximal femurs bilaterally. No radiographic evidence for acute


fracture. Correlate with any history of inability to ambulate.


 


 


 2011 [employer]- All Rights Reserved


 








Tibia/Fibula X-Ray  11/15/18 00:00


IMPRESSION:  ORIF tibial fracture.  Refer to operative note for further 

information.


 








Ankle X-Ray  11/15/18 06:35


IMPRESSION:


 


Displaced oblique fractures of the distal fibular and tibial


diaphyses as described above.


 








Head CT  11/15/18 06:35


IMPRESSION:


 


1. There is no evidence of acute intracranial pathology.


2. Stable enlarged ventricles out of proportion to the sulci


which can be seen with normal pressure hydrocephalus.


3. 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


infarcts.


 














Assessment & Plan





- Diagnosis


(1) Displaced fracture of tibia


Qualifiers: 


   Encounter type: subsequent encounter   Tibia location: distal physis (incl. 

Salter[place]Prieto)   Laterality: right 


Is this a current diagnosis for this admission?: Yes   





- Plan Summary


Plan Summary: 





Patient is 2 weeks out from intramedullary nailing of right tibial fracture.  

Proximal incision is ready to remove the staples but the distal 1 I would wait 

a few more days before removing those.


Continue nonweightbearing and physical therapy.


Continue pain control per primary team.


Follow-up with Dr. Sanchez in a week after being discharged.

## 2018-12-01 NOTE — PDOC PROGRESS REPORT
Subjective


Progress Note for:: 12/01/18


Subjective:: 





Still with significant pain in his right leg.  He had surgical repair of the 

fracture yesterday.


11/17- Better today. Pain Better. Now Constipated.


11/18/2018-still complaining of pain in the right leg.  He has not had a bowel 

movement and is distended.


11/19/2018-sitting up in the chair.  He is feeling better.  He still has pain 

in his right leg but it is not as bad.  He did have a good response to the 

aggressive bowel regimen.


12/1/2018-patient is resting comfortably.  Right leg swelling has resolved.  A 

new issue is the erythematous rash in the perianal area extending through the 

perineum into both groin areas.  He reports that it is uncomfortable and very 

itchy.


Reason For Visit: 


FX OF RIGHT LEG








Physical Exam


Vital Signs: 


 











Temp Pulse Resp BP Pulse Ox


 


 97.5 F   96   17   118/78   100 


 


 12/01/18 11:46  12/01/18 11:46  12/01/18 11:46  12/01/18 11:46  12/01/18 11:46








 Intake & Output











 11/30/18 12/01/18 12/02/18





 06:59 06:59 06:59


 


Intake Total 1851 814 


 


Output Total 1500 601 


 


Balance 351 213 


 


Weight 61 kg  











General appearance: PRESENT: no acute distress, cooperative, well-developed


Head exam: PRESENT: atraumatic, normocephalic


Eye exam: PRESENT: conjunctiva pink.  ABSENT: scleral icterus


Ear exam: PRESENT: normal external ear exam


Mouth exam: PRESENT: moist, tongue midline


Respiratory exam: PRESENT: clear to auscultation david, symmetrical, unlabored.  

ABSENT: rales, rhonchi, wheezes


Cardiovascular exam: PRESENT: RRR, +S1, +S2


Extremities exam: PRESENT: other - Staples remain right knee incision


Musculoskeletal exam: PRESENT: other - Decreased muscle mass lower extremities


Neurological exam: PRESENT: alert, awake, oriented to person, oriented to place

, oriented to situation, CN II-XII grossly intact


Psychiatric exam: PRESENT: appropriate affect, normal mood.  ABSENT: agitated, 

anxious


Focused psych exam: ABSENT: restlessness


Skin exam: PRESENT: other - Extending from the perianal area through the 

perineum and into both groins is a macular rash with occasional satellite 

lesions.  It is flat and there is no broken skin.  It has the appearance of a 

yeast dermatitis.





Results


Laboratory Results: 


 





 11/30/18 04:20 





 11/27/18 06:00 








Impressions: 


 





Chest X-Ray  11/15/18 00:00


IMPRESSION:  No acute findings


 








Fluoroscopy  11/15/18 00:00


IMPRESSION:  ORIF tibial fracture.  Refer to operative note for further 

information.


 








Pelvis X-Ray  11/15/18 00:00


IMPRESSION:


 


Postsurgical changes with heterotopic bone formation of the


proximal femurs bilaterally. No radiographic evidence for acute


fracture. Correlate with any history of inability to ambulate.


 


 


 2011 [employer]- All Rights Reserved


 








Tibia/Fibula X-Ray  11/15/18 00:00


IMPRESSION:  ORIF tibial fracture.  Refer to operative note for further 

information.


 








Ankle X-Ray  11/15/18 06:35


IMPRESSION:


 


Displaced oblique fractures of the distal fibular and tibial


diaphyses as described above.


 








Head CT  11/15/18 06:35


IMPRESSION:


 


1. There is no evidence of acute intracranial pathology.


2. Stable enlarged ventricles out of proportion to the sulci


which can be seen with normal pressure hydrocephalus.


3. 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


infarcts.


 














Assessment & Plan





- Diagnosis


(1) Displaced fracture of tibia


Qualifiers: 


   Encounter type: subsequent encounter   Tibia location: distal physis (incl. 

Salter-Prieto)   Laterality: right 


Is this a current diagnosis for this admission?: Yes   


Plan: 


The patient sustained a fracture of the right tibia and fibula after a fall 

today.  Orthopedic surgery has seen the patient and will be taking him to the 

OR for surgical repair.  From a medical standpoint he is cleared for surgery.  

He does not have a history of myocardial infarction with a negative stress test 

in February.  Dr. Frank will be seeing the patient as well.





11/16/2018-the patient had surgical repair of his right leg yesterday.  He 

still in pain.  Narcotic analgesia is available.


11/17- Leg hurts today but the leg is hanging over the side of the bed. I 

encouraged elevation on a pillow.


11/18/2018-management per orthopedic surgery





11/19/2018-still having some pain.  I will add ice packs in addition to his 

pain medications.  He will likely need rehab.


12/1/2018-patient is resting quite comfortably.  It has been 2 weeks since his 

surgery.  It is likely his staples can be removed.  I will defer to orthopedic 

surgery.  There is no further swelling in the knee or leg.








(2) Neurogenic bladder


Is this a current diagnosis for this admission?: Yes   


Plan: 


As noted above the patient straight catheterizes himself regularly.  He does 

report recurrent urinary tract infections.  He is currently on Bactrim for an 

episode of cystitis.  I will continue his Bactrim as an inpatient.  Urinalysis 

is pending.





The patient a Scott catheter placed in the OR.  We will leave it in place.  The 

urine is cloudy.  Culture is pending and preliminarily appears positive.  I 

will resume his Bactrim that he takes as an outpatient.  Once the sensitivities 

are available I can modify his antibiotic therapy appropriately.


11/17- back on Bactrim. Urine Cx still pending.


11/18/2018-cystitis with Klebsiella pneumonia.  Resistant to Bactrim.  The 

patient is on Rocephin.  The Klebsiella is sensitive to Rocephin.


11/19/2018-the patient has a chronic neurogenic bladder.  He self catheterizes.

  He is on suppressive antibiotics of Bactrim.  He developed a Klebsiella 

infection and it is resistant to Bactrim and intermediate to nitrofurantoin.  

The patient will receive a total of 5 doses of ceftriaxone.  The bacteria is 

sensitive and this should cover his infection.  His urologist will have to 

decide on a different suppressive regimen.


12/1/2018-Scott catheter in place.  We will work on removal.  With his current 

yeast dermatitis urinary incontinence would be an issue.  Patient was 

performing straight catheterization prior to this admission.  I will discuss 

with the patient and see if he wishes to return to that practice.








(3) Cystitis


Is this a current diagnosis for this admission?: Yes   


Plan: 


11/17- Was on suppresive Abx daily. Resumed bactrim ds BID. Urine culture with 

gram neg bacilli. Final ID and Sensitivities pending.


11/18/2018-discontinue Bactrim.  Continue Rocephin as ordered.  The patient 

will likely need a different suppressive antibiotic.


11/19/2018-culture positive for Klebsiella.  Resistant to Bactrim.  Currently 

on ceftriaxone.


12/1/2018-antibiotic therapy completed.  Cystitis resolved.








(4) Coronary artery disease with hx of myocardial infarct w/o hx of CABG


Is this a current diagnosis for this admission?: Yes   


Plan: 


Currently stable.  No cardiac symptoms.  Continue medications as ordered.


11/17- No change in medications


11/18/2018-no cardiac symptoms.  Continue current regimen.


11/19/2018-doing well.  Asymptomatic.  Continue current treatment plan.


12/1/2018-patient has been stable with no acute coronary symptoms.  Continue 

current treatment plan.








(5) COPD (chronic obstructive pulmonary disease)


Qualifiers: 


   COPD type: unspecified COPD   Qualified Code(s): J44.9 - Chronic obstructive 

pulmonary disease, unspecified   


Is this a current diagnosis for this admission?: Yes   


Plan: 


The patient has history of chronic obstructive pulmonary disease but is 

currently asymptomatic.  I will continue his home regimen.  Oxygen will be 

available for any hypoxia.  DuoNeb nebulizer therapy will be available if 

needed.





11/16/2018-no dyspnea or wheezing.  Continue current regimen as ordered.





11/17- breathing comfortably. No change in treatment plan.


11/18/2018-patient is stable.  Continue current regimen.


11/19/2018-breathing is comfortable.  There is no respiratory distress.  

Continue current


12/1/2018-no evidence of respiratory distress.  Continue regimen as ordered.








(6) Diabetes mellitus, insulin dependent (IDDM), uncontrolled


Qualifiers: 


   Glycemic state: with hyperglycemia   Qualified Code(s): E10.65 - Type 1 

diabetes mellitus with hyperglycemia   


Is this a current diagnosis for this admission?: Yes   


Plan: 


I will continue the patient's current insulin regimen.  For tonight he will 

receive a smaller dose of his long-acting insulin because he will be n.p.o. for 

most of the day.  He will be on a Humalog sliding scale as well.  A hemoglobin 

A1c is pending for further clarification of the patient's level of control.





His glucose readings are still high.  His hemoglobin A1c was 10.6.  I have 

increased his Levemir to 35 units daily.


11/17- Increased lantus to 40 units daily. continue sliding scale.





11/18/2018-still with some glucose instability.  We will monitor the pattern 

for another day and probably make adjustments in his Lantus.  He may need twice 

daily dosing.


11/19/2018-glucoses are slightly better.  He does tend to increase as the day 

goes on.  His glucose this morning was in fact normal.  May need to add a low-

dose of Lantus in the morning.  We will continue on his current dose for today.


12/1/2018-the patient has been on sliding scale only for several days.  He 

still exhibits glucose readings over 200.  Low-dose Lantus trial will again be 

instituted.








(7) Hypothyroidism


Qualifiers: 


   Hypothyroidism type: unspecified   Qualified Code(s): E03.9 - Hypothyroidism

, unspecified   


Is this a current diagnosis for this admission?: Yes   


Plan: 


We will continue his current dose of levothyroxine.  He takes 25 mcg daily.  No 

clinical indication for change.











(8) Bipolar 1 disorder


Is this a current diagnosis for this admission?: Yes   


Plan: 


Patient is on multiple medications.  We will continue his current regimen.  

Currently he appears asymptomatic.  I will obtain more detailed information on 

the history of his mental illness.





11/16/2018-the patient is slightly agitated but I believe this is from his 

pain.  Continue current medications as ordered.


11/17/18- Calm today. does respond to pain appropriately. In good spirits.


11/18/2018-seems uncomfortable today.  This is likely due to constipation.  No 

change in current regimen.


12/1/2018-patient is asymptomatic today.  Continue current regimen.








(9) Constipation


Qualifiers: 


   Constipation type: drug induced constipation   Qualified Code(s): K59.03 - 

Drug induced constipation   


Is this a current diagnosis for this admission?: Yes   


Plan: 


11/17- Likely due to pain meds. Will give Miralax now and make available PRN. 

Start colace 100 bid as well.


11/18/2018-still with no bowel movement.  Distended and tympanitic.  I will 

administer a bottle of magnesium citrate and a more aggressive bowel regimen.


11/19/2018-he did have a good response to aggressive regimen yesterday.  We 

will continue stool softeners to try and avoid opiate-induced constipation.


12/1/2018-continue current regimen of stool softeners.








(10) Hydrocephalus


Is this a current diagnosis for this admission?: Yes   


Plan: 


The patient has normal pressure hydrocephalus.  He also has spina bifida.  He 

is awaiting neurosurgical consultation for shunt placement and surgical 

intervention for his spinal cord.  Unfortunately the significant gait 

abnormality has caused multiple falls.  He has worked with physical therapy.  

He does have a walker but it was difficult to use in the hotel that they are 

presently staying in.





11/16/2018-currently stable.  Awaiting neurosurgical evaluation for shunt 

placement.


11/17-stable no change


11/18/2018-stable no change.  Continue current regimen.


11/19/2018-no change in treatment plan.


12/1/2018-no change in his clinical presentation.  Continue current treatment 

plan.








(11) Spina bifida


Qualifiers: 


   Spinal region: unspecified   Presence of hydrocephalus: unspecified 

hydrocephalus presence   Qualified Code(s): Q05.9 - Spina bifida, unspecified   


Is this a current diagnosis for this admission?: Yes   


Plan: 


The patient has a history of spina bifida with a "tethered "spinal cord.  This 

has caused issues with his gait as noted above.  He also has a neurogenic 

bladder.  He straight catheterizes himself regularly.  Postoperatively we will 

leave a Scott catheter in place but look to remove it as soon as possible.





11/16/2018-currently stable.  Awaiting neurosurgical evaluation.


11/17-stable no change continue current regimen


11/18/2018-as above


11/19/2018-continue current plan.


12/1/2018-chronic and stable.  No changes in treatment plan.











- Time


Time Spent with patient: 25-34 minutes


Anticipated discharge: SNF

## 2018-12-02 LAB
ANION GAP SERPL CALC-SCNC: 16 MMOL/L (ref 5–19)
BUN SERPL-MCNC: 13 MG/DL (ref 7–20)
CALCIUM: 9.2 MG/DL (ref 8.4–10.2)
CHLORIDE SERPL-SCNC: 92 MMOL/L (ref 98–107)
CO2 SERPL-SCNC: 24 MMOL/L (ref 22–30)
ERYTHROCYTE [DISTWIDTH] IN BLOOD BY AUTOMATED COUNT: 15.7 % (ref 11.5–14)
GLUCOSE SERPL-MCNC: 143 MG/DL (ref 75–110)
HCT VFR BLD CALC: 32.8 % (ref 37.9–51)
HGB BLD-MCNC: 11 G/DL (ref 13.5–17)
MCH RBC QN AUTO: 28.4 PG (ref 27–33.4)
MCHC RBC AUTO-ENTMCNC: 33.5 G/DL (ref 32–36)
MCV RBC AUTO: 85 FL (ref 80–97)
PLATELET # BLD: 389 10^3/UL (ref 150–450)
POTASSIUM SERPL-SCNC: 4.5 MMOL/L (ref 3.6–5)
RBC # BLD AUTO: 3.87 10^6/UL (ref 4.35–5.55)
SODIUM SERPL-SCNC: 131.5 MMOL/L (ref 137–145)
WBC # BLD AUTO: 11.2 10^3/UL (ref 4–10.5)

## 2018-12-02 RX ADMIN — DIVALPROEX SODIUM SCH MG: 250 TABLET, FILM COATED, EXTENDED RELEASE ORAL at 09:49

## 2018-12-02 RX ADMIN — BUSPIRONE HYDROCHLORIDE SCH MG: 10 TABLET ORAL at 21:29

## 2018-12-02 RX ADMIN — BENZTROPINE MESYLATE SCH MG: 1 TABLET ORAL at 17:13

## 2018-12-02 RX ADMIN — DOCUSATE SODIUM SCH MG: 100 CAPSULE, LIQUID FILLED ORAL at 17:13

## 2018-12-02 RX ADMIN — OXYCODONE HYDROCHLORIDE PRN MG: 5 TABLET ORAL at 15:16

## 2018-12-02 RX ADMIN — LISINOPRIL SCH MG: 5 TABLET ORAL at 09:47

## 2018-12-02 RX ADMIN — ASPRIN AND EXTENDED-RELEASE DIPYRIDAMOLE SCH CAP.SR: 25; 200 CAPSULE ORAL at 09:46

## 2018-12-02 RX ADMIN — ASPRIN AND EXTENDED-RELEASE DIPYRIDAMOLE SCH CAP.SR: 25; 200 CAPSULE ORAL at 17:13

## 2018-12-02 RX ADMIN — DOCUSATE SODIUM SCH: 100 CAPSULE, LIQUID FILLED ORAL at 09:46

## 2018-12-02 RX ADMIN — LANSOPRAZOLE SCH MG: 30 TABLET, ORALLY DISINTEGRATING, DELAYED RELEASE ORAL at 09:46

## 2018-12-02 RX ADMIN — BENZTROPINE MESYLATE SCH MG: 1 TABLET ORAL at 09:49

## 2018-12-02 RX ADMIN — OXCARBAZEPINE SCH MG: 150 TABLET, FILM COATED ORAL at 09:49

## 2018-12-02 RX ADMIN — ACETAMINOPHEN PRN MG: 325 TABLET ORAL at 21:54

## 2018-12-02 RX ADMIN — BUSPIRONE HYDROCHLORIDE SCH MG: 10 TABLET ORAL at 09:46

## 2018-12-02 RX ADMIN — FLUOXETINE SCH MG: 20 CAPSULE ORAL at 10:07

## 2018-12-02 RX ADMIN — POTASSIUM CHLORIDE SCH MEQ: 750 TABLET, FILM COATED, EXTENDED RELEASE ORAL at 09:46

## 2018-12-02 RX ADMIN — OXCARBAZEPINE SCH MG: 150 TABLET, FILM COATED ORAL at 21:29

## 2018-12-02 RX ADMIN — TIOTROPIUM BROMIDE SCH INHALER: 18 CAPSULE ORAL; RESPIRATORY (INHALATION) at 09:48

## 2018-12-02 RX ADMIN — INSULIN LISPRO PRN UNIT: 100 INJECTION, SOLUTION INTRAVENOUS; SUBCUTANEOUS at 12:14

## 2018-12-02 RX ADMIN — LEVOTHYROXINE SODIUM SCH MG: 25 TABLET ORAL at 05:10

## 2018-12-02 RX ADMIN — CLOTRIMAZOLE SCH APPLIC: 10 CREAM TOPICAL at 17:13

## 2018-12-02 RX ADMIN — CYCLOBENZAPRINE HYDROCHLORIDE PRN MG: 10 TABLET, FILM COATED ORAL at 19:30

## 2018-12-02 RX ADMIN — INSULIN GLARGINE SCH UNITS: 100 INJECTION, SOLUTION SUBCUTANEOUS at 21:28

## 2018-12-02 RX ADMIN — DOCUSATE SODIUM SCH: 100 CAPSULE, LIQUID FILLED ORAL at 17:21

## 2018-12-02 RX ADMIN — SIMVASTATIN SCH MG: 40 TABLET, FILM COATED ORAL at 21:29

## 2018-12-02 RX ADMIN — METOPROLOL SUCCINATE SCH MG: 25 TABLET, EXTENDED RELEASE ORAL at 09:45

## 2018-12-02 NOTE — PDOC PROGRESS REPORT
Subjective


Progress Note for:: 12/02/18


Subjective:: 





Still with significant pain in his right leg.  He had surgical repair of the 

fracture yesterday.


11/17- Better today. Pain Better. Now Constipated.


11/18/2018-still complaining of pain in the right leg.  He has not had a bowel 

movement and is distended.


11/19/2018-sitting up in the chair.  He is feeling better.  He still has pain 

in his right leg but it is not as bad.  He did have a good response to the 

aggressive bowel regimen.


12/1/2018-patient is resting comfortably.  Right leg swelling has resolved.  A 

new issue is the erythematous rash in the perianal area extending through the 

perineum into both groin areas.  He reports that it is uncomfortable and very 

itchy.


December 2, 2018-patient is concerned about his discharge plan.


Reason For Visit: 


FX OF RIGHT LEG








Physical Exam


Vital Signs: 


 











Temp Pulse Resp BP Pulse Ox


 


 98.7 F   74   16   118/62   99 


 


 12/02/18 11:52  12/02/18 11:52  12/02/18 11:52  12/02/18 11:52  12/02/18 11:52








 Intake & Output











 12/01/18 12/02/18 12/03/18





 06:59 06:59 06:59


 


Intake Total 814 1275 


 


Output Total 601 1200 


 


Balance 213 75 


 


Weight  61 kg 











General appearance: PRESENT: no acute distress, cooperative, well-developed


Mouth exam: PRESENT: moist, tongue midline


Respiratory exam: PRESENT: clear to auscultation david, symmetrical, unlabored.  

ABSENT: rales, rhonchi, wheezes


Cardiovascular exam: PRESENT: RRR, +S1, +S2


GI/Abdominal exam: PRESENT: normal bowel sounds, soft.  ABSENT: distended, 

guarding, tenderness


Gentrourinary exam: PRESENT: indwelling catheter


Neurological exam: PRESENT: alert, awake, oriented to person, oriented to place

, oriented to situation


Psychiatric exam: PRESENT: anxious





Results


Laboratory Results: 


 





 12/02/18 04:45 





 12/02/18 04:45 





 











  12/02/18 12/02/18





  04:45 04:45


 


WBC  11.2 H 


 


RBC  3.87 L 


 


Hgb  11.0 L 


 


Hct  32.8 L 


 


MCV  85 


 


MCH  28.4 


 


MCHC  33.5 


 


RDW  15.7 H 


 


Plt Count  389 


 


Sodium   131.5 L


 


Potassium   4.5


 


Chloride   92 L


 


Carbon Dioxide   24


 


Anion Gap   16


 


BUN   13


 


Creatinine   0.84


 


Est GFR ( Amer)   > 60


 


Est GFR (Non-Af Amer)   > 60


 


Glucose   143 H


 


Calcium   9.2


 


Magnesium   1.6











Impressions: 


 





Chest X-Ray  11/15/18 00:00


IMPRESSION:  No acute findings


 








Fluoroscopy  11/15/18 00:00


IMPRESSION:  ORIF tibial fracture.  Refer to operative note for further 

information.


 








Pelvis X-Ray  11/15/18 00:00


IMPRESSION:


 


Postsurgical changes with heterotopic bone formation of the


proximal femurs bilaterally. No radiographic evidence for acute


fracture. Correlate with any history of inability to ambulate.


 


 


 2011 [employer]- All Rights Reserved


 








Tibia/Fibula X-Ray  11/15/18 00:00


IMPRESSION:  ORIF tibial fracture.  Refer to operative note for further 

information.


 








Ankle X-Ray  11/15/18 06:35


IMPRESSION:


 


Displaced oblique fractures of the distal fibular and tibial


diaphyses as described above.


 








Head CT  11/15/18 06:35


IMPRESSION:


 


1. There is no evidence of acute intracranial pathology.


2. Stable enlarged ventricles out of proportion to the sulci


which can be seen with normal pressure hydrocephalus.


3. 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


infarcts.


 














Assessment & Plan





- Diagnosis


(1) Displaced fracture of tibia


Qualifiers: 


   Encounter type: subsequent encounter   Tibia location: distal physis (incl. 

Salter-Prieto)   Laterality: right 


Is this a current diagnosis for this admission?: Yes   


Plan: 


The patient sustained a fracture of the right tibia and fibula after a fall 

today.  Orthopedic surgery has seen the patient and will be taking him to the 

OR for surgical repair.  From a medical standpoint he is cleared for surgery.  

He does not have a history of myocardial infarction with a negative stress test 

in February.  Dr. Frank will be seeing the patient as well.





11/16/2018-the patient had surgical repair of his right leg yesterday.  He 

still in pain.  Narcotic analgesia is available.


11/17- Leg hurts today but the leg is hanging over the side of the bed. I 

encouraged elevation on a pillow.


11/18/2018-management per orthopedic surgery





11/19/2018-still having some pain.  I will add ice packs in addition to his 

pain medications.  He will likely need rehab.


12/1/2018-patient is resting quite comfortably.  It has been 2 weeks since his 

surgery.  It is likely his staples can be removed.  I will defer to orthopedic 

surgery.  There is no further swelling in the knee or leg.


December 2, 2018-stable.  Please also see the orthopedic surgery note.








(2) Neurogenic bladder


Is this a current diagnosis for this admission?: Yes   


Plan: 


As noted above the patient straight catheterizes himself regularly.  He does 

report recurrent urinary tract infections.  He is currently on Bactrim for an 

episode of cystitis.  I will continue his Bactrim as an inpatient.  Urinalysis 

is pending.





The patient a Scott catheter placed in the OR.  We will leave it in place.  The 

urine is cloudy.  Culture is pending and preliminarily appears positive.  I 

will resume his Bactrim that he takes as an outpatient.  Once the sensitivities 

are available I can modify his antibiotic therapy appropriately.


11/17- back on Bactrim. Urine Cx still pending.


11/18/2018-cystitis with Klebsiella pneumonia.  Resistant to Bactrim.  The 

patient is on Rocephin.  The Klebsiella is sensitive to Rocephin.


11/19/2018-the patient has a chronic neurogenic bladder.  He self catheterizes.

  He is on suppressive antibiotics of Bactrim.  He developed a Klebsiella 

infection and it is resistant to Bactrim and intermediate to nitrofurantoin.  

The patient will receive a total of 5 doses of ceftriaxone.  The bacteria is 

sensitive and this should cover his infection.  His urologist will have to 

decide on a different suppressive regimen.


12/1/2018-Scott catheter in place.  We will work on removal.  With his current 

yeast dermatitis urinary incontinence would be an issue.  Patient was 

performing straight catheterization prior to this admission.  I will discuss 

with the patient and see if he wishes to return to that practice.


December 2, 2018-with regard to yesterday's note, the nurse updated me and 

reported significant bleeding last week when the patient return to self-

catheterization.  It has become increasingly harder to catheterize the patient.

  In fact a coud catheter was required.  The patient reports that his 

urologist stated that he was close to a permanent catheter before.  With this 

in mind I will leave the catheter in place.  I will also discontinue his Flomax.








(3) Cystitis


Is this a current diagnosis for this admission?: Yes   


Plan: 


11/17- Was on suppresive Abx daily. Resumed bactrim ds BID. Urine culture with 

gram neg bacilli. Final ID and Sensitivities pending.


11/18/2018-discontinue Bactrim.  Continue Rocephin as ordered.  The patient 

will likely need a different suppressive antibiotic.


11/19/2018-culture positive for Klebsiella.  Resistant to Bactrim.  Currently 

on ceftriaxone.


12/1/2018-antibiotic therapy completed.  Cystitis resolved.








(4) Coronary artery disease with hx of myocardial infarct w/o hx of CABG


Is this a current diagnosis for this admission?: Yes   


Plan: 


Currently stable.  No cardiac symptoms.  Continue medications as ordered.


11/17- No change in medications


11/18/2018-no cardiac symptoms.  Continue current regimen.


11/19/2018-doing well.  Asymptomatic.  Continue current treatment plan.


12/1/2018-patient has been stable with no acute coronary symptoms.  Continue 

current treatment plan.


December 2, 2018-continue current regimen








(5) COPD (chronic obstructive pulmonary disease)


Qualifiers: 


   COPD type: unspecified COPD   Qualified Code(s): J44.9 - Chronic obstructive 

pulmonary disease, unspecified   


Is this a current diagnosis for this admission?: Yes   


Plan: 


The patient has history of chronic obstructive pulmonary disease but is 

currently asymptomatic.  I will continue his home regimen.  Oxygen will be 

available for any hypoxia.  DuoNeb nebulizer therapy will be available if 

needed.





11/16/2018-no dyspnea or wheezing.  Continue current regimen as ordered.





11/17- breathing comfortably. No change in treatment plan.


11/18/2018-patient is stable.  Continue current regimen.


11/19/2018-breathing is comfortable.  There is no respiratory distress.  

Continue current


12/1/2018-no evidence of respiratory distress.  Continue regimen as ordered.


December 2, 2018-no changes in his current treatment plan








(6) Diabetes mellitus, insulin dependent (IDDM), uncontrolled


Qualifiers: 


   Glycemic state: with hyperglycemia   Qualified Code(s): E10.65 - Type 1 

diabetes mellitus with hyperglycemia   


Is this a current diagnosis for this admission?: Yes   


Plan: 


I will continue the patient's current insulin regimen.  For tonight he will 

receive a smaller dose of his long-acting insulin because he will be n.p.o. for 

most of the day.  He will be on a Humalog sliding scale as well.  A hemoglobin 

A1c is pending for further clarification of the patient's level of control.





His glucose readings are still high.  His hemoglobin A1c was 10.6.  I have 

increased his Levemir to 35 units daily.


11/17- Increased lantus to 40 units daily. continue sliding scale.





11/18/2018-still with some glucose instability.  We will monitor the pattern 

for another day and probably make adjustments in his Lantus.  He may need twice 

daily dosing.


11/19/2018-glucoses are slightly better.  He does tend to increase as the day 

goes on.  His glucose this morning was in fact normal.  May need to add a low-

dose of Lantus in the morning.  We will continue on his current dose for today.


12/1/2018-the patient has been on sliding scale only for several days.  He 

still exhibits glucose readings over 200.  Low-dose Lantus trial will again be 

instituted.


December 2, 2018-as noted above a small dose of Lantus was added to the patient'

s regimen.  Subsequent most of his blood sugars have been less than 200.  

Continue to monitor and continue sliding scale.








(7) Hypothyroidism


Qualifiers: 


   Hypothyroidism type: unspecified   Qualified Code(s): E03.9 - Hypothyroidism

, unspecified   


Is this a current diagnosis for this admission?: Yes   


Plan: 


We will continue his current dose of levothyroxine.  He takes 25 mcg daily.  No 

clinical indication for change.











(8) Bipolar 1 disorder


Is this a current diagnosis for this admission?: Yes   


Plan: 


Patient is on multiple medications.  We will continue his current regimen.  

Currently he appears asymptomatic.  I will obtain more detailed information on 

the history of his mental illness.





11/16/2018-the patient is slightly agitated but I believe this is from his 

pain.  Continue current medications as ordered.


11/17/18- Calm today. does respond to pain appropriately. In good spirits.


11/18/2018-seems uncomfortable today.  This is likely due to constipation.  No 

change in current regimen.


12/1/2018-patient is asymptomatic today.  Continue current regimen.


December 2, 2018-as noted above the patient did have acute agitation 

approximately 2 weeks ago.  This was in the initial postoperative window and 

lasted several days.  He is much better now.  Continue current regimen.








(9) Constipation


Qualifiers: 


   Constipation type: drug induced constipation   Qualified Code(s): K59.03 - 

Drug induced constipation   


Is this a current diagnosis for this admission?: Yes   


Plan: 


11/17- Likely due to pain meds. Will give Miralax now and make available PRN. 

Start colace 100 bid as well.


11/18/2018-still with no bowel movement.  Distended and tympanitic.  I will 

administer a bottle of magnesium citrate and a more aggressive bowel regimen.


11/19/2018-he did have a good response to aggressive regimen yesterday.  We 

will continue stool softeners to try and avoid opiate-induced constipation.


12/1/2018-continue current regimen of stool softeners.


December 2, 2018-no recurrence at this time.








(10) Hydrocephalus


Is this a current diagnosis for this admission?: Yes   


Plan: 


The patient has normal pressure hydrocephalus.  He also has spina bifida.  He 

is awaiting neurosurgical consultation for shunt placement and surgical 

intervention for his spinal cord.  Unfortunately the significant gait 

abnormality has caused multiple falls.  He has worked with physical therapy.  

He does have a walker but it was difficult to use in the hotel that they are 

presently staying in.





11/16/2018-currently stable.  Awaiting neurosurgical evaluation for shunt 

placement.


11/17-stable no change


11/18/2018-stable no change.  Continue current regimen.


11/19/2018-no change in treatment plan.


12/1/2018-no change in his clinical presentation.  Continue current treatment 

plan.


December 2, 2018-continue current plan.  Patient awaiting neurosurgical 

evaluation.








(11) Spina bifida


Qualifiers: 


   Spinal region: unspecified   Presence of hydrocephalus: unspecified 

hydrocephalus presence   Qualified Code(s): Q05.9 - Spina bifida, unspecified   


Is this a current diagnosis for this admission?: Yes   


Plan: 


The patient has a history of spina bifida with a "tethered "spinal cord.  This 

has caused issues with his gait as noted above.  He also has a neurogenic 

bladder.  He straight catheterizes himself regularly.  Postoperatively we will 

leave a Scott catheter in place but look to remove it as soon as possible.





11/16/2018-currently stable.  Awaiting neurosurgical evaluation.


11/17-stable no change continue current regimen


11/18/2018-as above


11/19/2018-continue current plan.


12/1/2018-chronic and stable.  No changes in treatment plan.


Some second 2018-patient awaiting neurosurgical consultation.  Continue current 

regimen at this time.








(12) Hyponatremia


Is this a current diagnosis for this admission?: Yes   


Plan: 


December 2, 2018-patient serum sodium was low at the time of admission.  He 

subsequently normalized and is low again today.  Before making any changes I 

will recheck his serum sodium tomorrow.








- Time


Time Spent with patient: 25-34 minutes


Medications reviewed and adjusted accordingly: Yes

## 2018-12-03 LAB
ANION GAP SERPL CALC-SCNC: 13 MMOL/L (ref 5–19)
BUN SERPL-MCNC: 14 MG/DL (ref 7–20)
CALCIUM: 9.2 MG/DL (ref 8.4–10.2)
CHLORIDE SERPL-SCNC: 89 MMOL/L (ref 98–107)
CO2 SERPL-SCNC: 26 MMOL/L (ref 22–30)
GLUCOSE SERPL-MCNC: 109 MG/DL (ref 75–110)
POTASSIUM SERPL-SCNC: 4.8 MMOL/L (ref 3.6–5)
SODIUM SERPL-SCNC: 127.8 MMOL/L (ref 137–145)

## 2018-12-03 RX ADMIN — OXYCODONE HYDROCHLORIDE PRN MG: 5 TABLET ORAL at 21:46

## 2018-12-03 RX ADMIN — ASPRIN AND EXTENDED-RELEASE DIPYRIDAMOLE SCH CAP.SR: 25; 200 CAPSULE ORAL at 09:40

## 2018-12-03 RX ADMIN — ASPRIN AND EXTENDED-RELEASE DIPYRIDAMOLE SCH CAP.SR: 25; 200 CAPSULE ORAL at 17:26

## 2018-12-03 RX ADMIN — POTASSIUM CHLORIDE SCH MEQ: 750 TABLET, FILM COATED, EXTENDED RELEASE ORAL at 09:39

## 2018-12-03 RX ADMIN — OXCARBAZEPINE SCH MG: 150 TABLET, FILM COATED ORAL at 21:47

## 2018-12-03 RX ADMIN — DOCUSATE SODIUM SCH MG: 100 CAPSULE, LIQUID FILLED ORAL at 09:39

## 2018-12-03 RX ADMIN — OXYCODONE HYDROCHLORIDE PRN MG: 5 TABLET ORAL at 09:37

## 2018-12-03 RX ADMIN — DIVALPROEX SODIUM SCH MG: 250 TABLET, FILM COATED, EXTENDED RELEASE ORAL at 09:39

## 2018-12-03 RX ADMIN — METOPROLOL SUCCINATE SCH MG: 25 TABLET, EXTENDED RELEASE ORAL at 09:38

## 2018-12-03 RX ADMIN — TIOTROPIUM BROMIDE SCH INHALER: 18 CAPSULE ORAL; RESPIRATORY (INHALATION) at 09:43

## 2018-12-03 RX ADMIN — DOCUSATE SODIUM SCH MG: 100 CAPSULE, LIQUID FILLED ORAL at 17:26

## 2018-12-03 RX ADMIN — OXCARBAZEPINE SCH MG: 150 TABLET, FILM COATED ORAL at 09:43

## 2018-12-03 RX ADMIN — CLOTRIMAZOLE SCH APPLIC: 10 CREAM TOPICAL at 17:27

## 2018-12-03 RX ADMIN — OXYCODONE HYDROCHLORIDE PRN MG: 5 TABLET ORAL at 15:39

## 2018-12-03 RX ADMIN — BENZTROPINE MESYLATE SCH MG: 1 TABLET ORAL at 17:26

## 2018-12-03 RX ADMIN — CLOTRIMAZOLE SCH APPLIC: 10 CREAM TOPICAL at 09:42

## 2018-12-03 RX ADMIN — INSULIN GLARGINE SCH UNITS: 100 INJECTION, SOLUTION SUBCUTANEOUS at 21:46

## 2018-12-03 RX ADMIN — LEVOTHYROXINE SODIUM SCH MG: 25 TABLET ORAL at 05:42

## 2018-12-03 RX ADMIN — LISINOPRIL SCH MG: 5 TABLET ORAL at 09:37

## 2018-12-03 RX ADMIN — SIMVASTATIN SCH MG: 40 TABLET, FILM COATED ORAL at 21:46

## 2018-12-03 RX ADMIN — FLUOXETINE SCH MG: 20 CAPSULE ORAL at 09:41

## 2018-12-03 RX ADMIN — BUSPIRONE HYDROCHLORIDE SCH MG: 10 TABLET ORAL at 21:46

## 2018-12-03 RX ADMIN — LANSOPRAZOLE SCH MG: 30 TABLET, ORALLY DISINTEGRATING, DELAYED RELEASE ORAL at 09:39

## 2018-12-03 RX ADMIN — BUSPIRONE HYDROCHLORIDE SCH MG: 10 TABLET ORAL at 09:39

## 2018-12-03 RX ADMIN — BENZTROPINE MESYLATE SCH MG: 1 TABLET ORAL at 09:40

## 2018-12-03 NOTE — PDOC PROGRESS REPORT
Subjective


Progress Note for:: 12/03/18


Subjective:: 





Still with significant pain in his right leg.  He had surgical repair of the 

fracture yesterday.


11/17- Better today. Pain Better. Now Constipated.


11/18/2018-still complaining of pain in the right leg.  He has not had a bowel 

movement and is distended.


11/19/2018-sitting up in the chair.  He is feeling better.  He still has pain 

in his right leg but it is not as bad.  He did have a good response to the 

aggressive bowel regimen.


12/1/2018-patient is resting comfortably.  Right leg swelling has resolved.  A 

new issue is the erythematous rash in the perianal area extending through the 

perineum into both groin areas.  He reports that it is uncomfortable and very 

itchy.


December 2, 2018-patient is concerned about his discharge plan.


12/3/2018-the patient is in fact resting comfortably in bed.  His wife is 

present at the bedside.


Reason For Visit: 


FX OF RIGHT LEG








Physical Exam


Vital Signs: 


 











Temp Pulse Resp BP Pulse Ox


 


 98.5 F   75   18   127/84 H  97 


 


 12/03/18 20:27  12/03/18 20:27  12/03/18 20:27  12/03/18 20:27  12/03/18 20:27








 Intake & Output











 12/02/18 12/03/18 12/04/18





 06:59 06:59 06:59


 


Intake Total 1275 716 665


 


Output Total 1200 1376 755


 


Balance 75 -660 -90


 


Weight 61 kg 60.6 kg 











General appearance: PRESENT: no acute distress, cooperative, well-developed


Respiratory exam: PRESENT: clear to auscultation david, symmetrical, unlabored.  

ABSENT: rales, rhonchi


Cardiovascular exam: PRESENT: RRR, +S1, +S2, systolic murmur - 2/6


GI/Abdominal exam: PRESENT: normal bowel sounds, soft.  ABSENT: tenderness


Neurological exam: PRESENT: alert, awake, oriented to person, oriented to place

, oriented to situation


Psychiatric exam: PRESENT: appropriate affect, normal mood





Results


Laboratory Results: 


 





 12/02/18 04:45 





 12/03/18 07:57 





 











  12/03/18





  07:57


 


Sodium  127.8 L


 


Potassium  4.8


 


Chloride  89 L


 


Carbon Dioxide  26


 


Anion Gap  13


 


BUN  14


 


Creatinine  0.85


 


Est GFR ( Amer)  > 60


 


Est GFR (Non-Af Amer)  > 60


 


Glucose  109


 


Calcium  9.2











Impressions: 


 





Chest X-Ray  11/15/18 00:00


IMPRESSION:  No acute findings


 








Fluoroscopy  11/15/18 00:00


IMPRESSION:  ORIF tibial fracture.  Refer to operative note for further 

information.


 








Pelvis X-Ray  11/15/18 00:00


IMPRESSION:


 


Postsurgical changes with heterotopic bone formation of the


proximal femurs bilaterally. No radiographic evidence for acute


fracture. Correlate with any history of inability to ambulate.


 


 


 2011 [employer]- All Rights Reserved


 








Tibia/Fibula X-Ray  11/15/18 00:00


IMPRESSION:  ORIF tibial fracture.  Refer to operative note for further 

information.


 








Ankle X-Ray  11/15/18 06:35


IMPRESSION:


 


Displaced oblique fractures of the distal fibular and tibial


diaphyses as described above.


 








Head CT  11/15/18 06:35


IMPRESSION:


 


1. There is no evidence of acute intracranial pathology.


2. Stable enlarged ventricles out of proportion to the sulci


which can be seen with normal pressure hydrocephalus.


3. 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


infarcts.


 














Assessment & Plan





- Diagnosis


(1) Displaced fracture of tibia


Qualifiers: 


   Encounter type: subsequent encounter   Tibia location: distal physis (incl. 

Salter-Prieto)   Laterality: right 


Is this a current diagnosis for this admission?: Yes   


Plan: 


The patient sustained a fracture of the right tibia and fibula after a fall 

today.  Orthopedic surgery has seen the patient and will be taking him to the 

OR for surgical repair.  From a medical standpoint he is cleared for surgery.  

He does not have a history of myocardial infarction with a negative stress test 

in February.  Dr. Frank will be seeing the patient as well.





11/16/2018-the patient had surgical repair of his right leg yesterday.  He 

still in pain.  Narcotic analgesia is available.


11/17- Leg hurts today but the leg is hanging over the side of the bed. I 

encouraged elevation on a pillow.


11/18/2018-management per orthopedic surgery





11/19/2018-still having some pain.  I will add ice packs in addition to his 

pain medications.  He will likely need rehab.


12/1/2018-patient is resting quite comfortably.  It has been 2 weeks since his 

surgery.  It is likely his staples can be removed.  I will defer to orthopedic 

surgery.  There is no further swelling in the knee or leg.


December 2, 2018-stable.  Please also see the orthopedic surgery note.


12/3/2018-awaiting placement for additional rehab.








(2) Neurogenic bladder


Is this a current diagnosis for this admission?: Yes   


Plan: 


As noted above the patient straight catheterizes himself regularly.  He does 

report recurrent urinary tract infections.  He is currently on Bactrim for an 

episode of cystitis.  I will continue his Bactrim as an inpatient.  Urinalysis 

is pending.





The patient a Scott catheter placed in the OR.  We will leave it in place.  The 

urine is cloudy.  Culture is pending and preliminarily appears positive.  I 

will resume his Bactrim that he takes as an outpatient.  Once the sensitivities 

are available I can modify his antibiotic therapy appropriately.


11/17- back on Bactrim. Urine Cx still pending.


11/18/2018-cystitis with Klebsiella pneumonia.  Resistant to Bactrim.  The 

patient is on Rocephin.  The Klebsiella is sensitive to Rocephin.


11/19/2018-the patient has a chronic neurogenic bladder.  He self catheterizes.

  He is on suppressive antibiotics of Bactrim.  He developed a Klebsiella 

infection and it is resistant to Bactrim and intermediate to nitrofurantoin.  

The patient will receive a total of 5 doses of ceftriaxone.  The bacteria is 

sensitive and this should cover his infection.  His urologist will have to 

decide on a different suppressive regimen.


12/1/2018-Scott catheter in place.  We will work on removal.  With his current 

yeast dermatitis urinary incontinence would be an issue.  Patient was 

performing straight catheterization prior to this admission.  I will discuss 

with the patient and see if he wishes to return to that practice.


December 2, 2018-with regard to yesterday's note, the nurse updated me and 

reported significant bleeding last week when the patient return to self-

catheterization.  It has become increasingly harder to catheterize the patient.

  In fact a coud catheter was required.  The patient reports that his 

urologist stated that he was close to a permanent catheter before.  With this 

in mind I will leave the catheter in place.  I will also discontinue his Flomax.


12/3/2018-as above








(3) Cystitis


Is this a current diagnosis for this admission?: Yes   


Plan: 


11/17- Was on suppresive Abx daily. Resumed bactrim ds BID. Urine culture with 

gram neg bacilli. Final ID and Sensitivities pending.


11/18/2018-discontinue Bactrim.  Continue Rocephin as ordered.  The patient 

will likely need a different suppressive antibiotic.


11/19/2018-culture positive for Klebsiella.  Resistant to Bactrim.  Currently 

on ceftriaxone.


12/1/2018-antibiotic therapy completed.  Cystitis resolved.








(4) Coronary artery disease with hx of myocardial infarct w/o hx of CABG


Is this a current diagnosis for this admission?: Yes   


Plan: 


Currently stable.  No cardiac symptoms.  Continue medications as ordered.


11/17- No change in medications


11/18/2018-no cardiac symptoms.  Continue current regimen.


11/19/2018-doing well.  Asymptomatic.  Continue current treatment plan.


12/1/2018-patient has been stable with no acute coronary symptoms.  Continue 

current treatment plan.


December 2, 2018-continue current regimen


12/3/2018-as above








(5) COPD (chronic obstructive pulmonary disease)


Qualifiers: 


   COPD type: unspecified COPD   Qualified Code(s): J44.9 - Chronic obstructive 

pulmonary disease, unspecified   


Is this a current diagnosis for this admission?: Yes   


Plan: 


The patient has history of chronic obstructive pulmonary disease but is 

currently asymptomatic.  I will continue his home regimen.  Oxygen will be 

available for any hypoxia.  DuoNeb nebulizer therapy will be available if 

needed.





11/16/2018-no dyspnea or wheezing.  Continue current regimen as ordered.





11/17- breathing comfortably. No change in treatment plan.


11/18/2018-patient is stable.  Continue current regimen.


11/19/2018-breathing is comfortable.  There is no respiratory distress.  

Continue current


12/1/2018-no evidence of respiratory distress.  Continue regimen as ordered.


December 2, 2018-no changes in his current treatment plan


12/3/2018-patient stable.  No changes.








(6) Diabetes mellitus, insulin dependent (IDDM), uncontrolled


Qualifiers: 


   Glycemic state: with hyperglycemia   Qualified Code(s): E10.65 - Type 1 

diabetes mellitus with hyperglycemia   


Is this a current diagnosis for this admission?: Yes   


Plan: 


I will continue the patient's current insulin regimen.  For tonight he will 

receive a smaller dose of his long-acting insulin because he will be n.p.o. for 

most of the day.  He will be on a Humalog sliding scale as well.  A hemoglobin 

A1c is pending for further clarification of the patient's level of control.





His glucose readings are still high.  His hemoglobin A1c was 10.6.  I have 

increased his Levemir to 35 units daily.


11/17- Increased lantus to 40 units daily. continue sliding scale.





11/18/2018-still with some glucose instability.  We will monitor the pattern 

for another day and probably make adjustments in his Lantus.  He may need twice 

daily dosing.


11/19/2018-glucoses are slightly better.  He does tend to increase as the day 

goes on.  His glucose this morning was in fact normal.  May need to add a low-

dose of Lantus in the morning.  We will continue on his current dose for today.


12/1/2018-the patient has been on sliding scale only for several days.  He 

still exhibits glucose readings over 200.  Low-dose Lantus trial will again be 

instituted.


December 2, 2018-as noted above a small dose of Lantus was added to the patient'

s regimen.  Subsequent most of his blood sugars have been less than 200.  

Continue to monitor and continue sliding scale.


12/3/2018-continue current regimen








(7) Hypothyroidism


Qualifiers: 


   Hypothyroidism type: unspecified   Qualified Code(s): E03.9 - Hypothyroidism

, unspecified   


Is this a current diagnosis for this admission?: Yes   


Plan: 


We will continue his current dose of levothyroxine.  He takes 25 mcg daily.  No 

clinical indication for change.











(8) Bipolar 1 disorder


Is this a current diagnosis for this admission?: Yes   





(9) Constipation


Qualifiers: 


   Constipation type: drug induced constipation   Qualified Code(s): K59.03 - 

Drug induced constipation   


Is this a current diagnosis for this admission?: Yes   





(10) Hydrocephalus


Is this a current diagnosis for this admission?: Yes   





(11) Spina bifida


Qualifiers: 


   Spinal region: unspecified   Presence of hydrocephalus: unspecified 

hydrocephalus presence   Qualified Code(s): Q05.9 - Spina bifida, unspecified   


Is this a current diagnosis for this admission?: Yes   





(12) Hyponatremia


Is this a current diagnosis for this admission?: Yes   





- Time


Time Spent with patient: 15-24 minutes


Medications reviewed and adjusted accordingly: Yes

## 2018-12-04 RX ADMIN — METOPROLOL SUCCINATE SCH MG: 25 TABLET, EXTENDED RELEASE ORAL at 10:08

## 2018-12-04 RX ADMIN — ACETAMINOPHEN PRN MG: 325 TABLET ORAL at 18:06

## 2018-12-04 RX ADMIN — OXYCODONE HYDROCHLORIDE PRN MG: 5 TABLET ORAL at 06:11

## 2018-12-04 RX ADMIN — ASPRIN AND EXTENDED-RELEASE DIPYRIDAMOLE SCH CAP.SR: 25; 200 CAPSULE ORAL at 10:10

## 2018-12-04 RX ADMIN — INSULIN GLARGINE SCH UNITS: 100 INJECTION, SOLUTION SUBCUTANEOUS at 21:00

## 2018-12-04 RX ADMIN — OXCARBAZEPINE SCH MG: 150 TABLET, FILM COATED ORAL at 21:00

## 2018-12-04 RX ADMIN — OXYCODONE HYDROCHLORIDE PRN MG: 5 TABLET ORAL at 18:06

## 2018-12-04 RX ADMIN — CLOTRIMAZOLE SCH APPLIC: 10 CREAM TOPICAL at 17:47

## 2018-12-04 RX ADMIN — TIOTROPIUM BROMIDE SCH INHALER: 18 CAPSULE ORAL; RESPIRATORY (INHALATION) at 10:09

## 2018-12-04 RX ADMIN — POTASSIUM CHLORIDE SCH MEQ: 750 TABLET, FILM COATED, EXTENDED RELEASE ORAL at 10:04

## 2018-12-04 RX ADMIN — BENZTROPINE MESYLATE SCH MG: 1 TABLET ORAL at 10:10

## 2018-12-04 RX ADMIN — BENZTROPINE MESYLATE SCH MG: 1 TABLET ORAL at 17:30

## 2018-12-04 RX ADMIN — DOCUSATE SODIUM SCH MG: 100 CAPSULE, LIQUID FILLED ORAL at 10:09

## 2018-12-04 RX ADMIN — LANSOPRAZOLE SCH MG: 30 TABLET, ORALLY DISINTEGRATING, DELAYED RELEASE ORAL at 08:50

## 2018-12-04 RX ADMIN — BUSPIRONE HYDROCHLORIDE SCH MG: 10 TABLET ORAL at 10:08

## 2018-12-04 RX ADMIN — FLUOXETINE SCH MG: 20 CAPSULE ORAL at 10:10

## 2018-12-04 RX ADMIN — OXYCODONE HYDROCHLORIDE PRN MG: 5 TABLET ORAL at 12:06

## 2018-12-04 RX ADMIN — DOCUSATE SODIUM SCH MG: 100 CAPSULE, LIQUID FILLED ORAL at 17:30

## 2018-12-04 RX ADMIN — LISINOPRIL SCH MG: 5 TABLET ORAL at 10:04

## 2018-12-04 RX ADMIN — SIMVASTATIN SCH MG: 40 TABLET, FILM COATED ORAL at 21:00

## 2018-12-04 RX ADMIN — ASPRIN AND EXTENDED-RELEASE DIPYRIDAMOLE SCH CAP.SR: 25; 200 CAPSULE ORAL at 17:30

## 2018-12-04 RX ADMIN — LEVOTHYROXINE SODIUM SCH MG: 25 TABLET ORAL at 06:11

## 2018-12-04 RX ADMIN — BUSPIRONE HYDROCHLORIDE SCH MG: 10 TABLET ORAL at 21:00

## 2018-12-04 RX ADMIN — CYCLOBENZAPRINE HYDROCHLORIDE PRN MG: 10 TABLET, FILM COATED ORAL at 19:04

## 2018-12-04 RX ADMIN — CLOTRIMAZOLE SCH APPLIC: 10 CREAM TOPICAL at 10:11

## 2018-12-04 RX ADMIN — BACLOFEN PRN MG: 10 TABLET ORAL at 21:00

## 2018-12-04 RX ADMIN — DIVALPROEX SODIUM SCH MG: 250 TABLET, FILM COATED, EXTENDED RELEASE ORAL at 10:08

## 2018-12-04 RX ADMIN — OXCARBAZEPINE SCH MG: 150 TABLET, FILM COATED ORAL at 10:12

## 2018-12-04 RX ADMIN — INSULIN LISPRO PRN UNIT: 100 INJECTION, SOLUTION INTRAVENOUS; SUBCUTANEOUS at 12:58

## 2018-12-04 RX ADMIN — ACETAMINOPHEN PRN MG: 325 TABLET ORAL at 12:05

## 2018-12-04 NOTE — PDOC PROGRESS REPORT
Subjective


Progress Note for:: 12/04/18


Subjective:: 





Still with significant pain in his right leg.  He had surgical repair of the 

fracture yesterday.


11/17- Better today. Pain Better. Now Constipated.


11/18/2018-still complaining of pain in the right leg.  He has not had a bowel 

movement and is distended.


11/19/2018-sitting up in the chair.  He is feeling better.  He still has pain 

in his right leg but it is not as bad.  He did have a good response to the 

aggressive bowel regimen.


12/1/2018-patient is resting comfortably.  Right leg swelling has resolved.  A 

new issue is the erythematous rash in the perianal area extending through the 

perineum into both groin areas.  He reports that it is uncomfortable and very 

itchy.


December 2, 2018-patient is concerned about his discharge plan.


12/3/2018-the patient is in fact resting comfortably in bed.  His wife is 

present at the bedside.


12/4/2018-the patient is sitting up in the chair.  He just finished breakfast 

and is drinking coffee.  He has no acute complaints.


Reason For Visit: 


FX OF RIGHT LEG








Physical Exam


Vital Signs: 


 











Temp Pulse Resp BP Pulse Ox


 


 98.7 F   89   20   96/53 L  100 


 


 12/04/18 09:04  12/04/18 09:04  12/04/18 09:04  12/04/18 09:04  12/04/18 09:04








 Intake & Output











 12/03/18 12/04/18 12/05/18





 06:59 06:59 06:59


 


Intake Total 716 665 


 


Output Total 137 1955 


 


Balance -660 -1290 


 


Weight 60.6 kg 61.1 kg 











General appearance: PRESENT: no acute distress, cooperative


Ear exam: PRESENT: normal external ear exam


Mouth exam: PRESENT: dry mucosa, tongue midline


Respiratory exam: PRESENT: clear to auscultation david, symmetrical, unlabored.  

ABSENT: rales, rhonchi, wheezes


Cardiovascular exam: PRESENT: RRR, +S1, +S2


GI/Abdominal exam: PRESENT: normal bowel sounds, soft.  ABSENT: guarding, 

tenderness


Musculoskeletal exam: PRESENT: other - Decreased muscle mass


Neurological exam: PRESENT: alert, awake, oriented to person, oriented to place

, oriented to situation, CN II-XII grossly intact


Psychiatric exam: PRESENT: appropriate affect, normal mood.  ABSENT: agitated, 

anxious





Results


Laboratory Results: 


 





 12/02/18 04:45 





 12/03/18 07:57 








Impressions: 


 





Chest X-Ray  11/15/18 00:00


IMPRESSION:  No acute findings


 








Fluoroscopy  11/15/18 00:00


IMPRESSION:  ORIF tibial fracture.  Refer to operative note for further 

information.


 








Pelvis X-Ray  11/15/18 00:00


IMPRESSION:


 


Postsurgical changes with heterotopic bone formation of the


proximal femurs bilaterally. No radiographic evidence for acute


fracture. Correlate with any history of inability to ambulate.


 


 


 2011 [employer]- All Rights Reserved


 








Tibia/Fibula X-Ray  11/15/18 00:00


IMPRESSION:  ORIF tibial fracture.  Refer to operative note for further 

information.


 








Ankle X-Ray  11/15/18 06:35


IMPRESSION:


 


Displaced oblique fractures of the distal fibular and tibial


diaphyses as described above.


 








Head CT  11/15/18 06:35


IMPRESSION:


 


1. There is no evidence of acute intracranial pathology.


2. Stable enlarged ventricles out of proportion to the sulci


which can be seen with normal pressure hydrocephalus.


3. 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


infarcts.


 














Assessment & Plan





- Diagnosis


(1) Displaced fracture of tibia


Qualifiers: 


   Encounter type: subsequent encounter   Tibia location: distal physis (incl. 

Salter-Prieto)   Laterality: right 


Is this a current diagnosis for this admission?: Yes   


Plan: 


The patient sustained a fracture of the right tibia and fibula after a fall 

today.  Orthopedic surgery has seen the patient and will be taking him to the 

OR for surgical repair.  From a medical standpoint he is cleared for surgery.  

He does not have a history of myocardial infarction with a negative stress test 

in February.  Dr. Frank will be seeing the patient as well.





11/16/2018-the patient had surgical repair of his right leg yesterday.  He 

still in pain.  Narcotic analgesia is available.


11/17- Leg hurts today but the leg is hanging over the side of the bed. I 

encouraged elevation on a pillow.


11/18/2018-management per orthopedic surgery





11/19/2018-still having some pain.  I will add ice packs in addition to his 

pain medications.  He will likely need rehab.


12/1/2018-patient is resting quite comfortably.  It has been 2 weeks since his 

surgery.  It is likely his staples can be removed.  I will defer to orthopedic 

surgery.  There is no further swelling in the knee or leg.


December 2, 2018-stable.  Please also see the orthopedic surgery note.


12/3/2018-awaiting placement for additional rehab.


12/4/2018-I did speak to physical therapy after they worked with the patient.  

He is toe-touch at this point but still very limited.  They recommend skilled 

therapy after discharge.








(2) Neurogenic bladder


Is this a current diagnosis for this admission?: Yes   


Plan: 


As noted above the patient straight catheterizes himself regularly.  He does 

report recurrent urinary tract infections.  He is currently on Bactrim for an 

episode of cystitis.  I will continue his Bactrim as an inpatient.  Urinalysis 

is pending.





The patient a Scott catheter placed in the OR.  We will leave it in place.  The 

urine is cloudy.  Culture is pending and preliminarily appears positive.  I 

will resume his Bactrim that he takes as an outpatient.  Once the sensitivities 

are available I can modify his antibiotic therapy appropriately.


11/17- back on Bactrim. Urine Cx still pending.


11/18/2018-cystitis with Klebsiella pneumonia.  Resistant to Bactrim.  The 

patient is on Rocephin.  The Klebsiella is sensitive to Rocephin.


11/19/2018-the patient has a chronic neurogenic bladder.  He self catheterizes.

  He is on suppressive antibiotics of Bactrim.  He developed a Klebsiella 

infection and it is resistant to Bactrim and intermediate to nitrofurantoin.  

The patient will receive a total of 5 doses of ceftriaxone.  The bacteria is 

sensitive and this should cover his infection.  His urologist will have to 

decide on a different suppressive regimen.


12/1/2018-Scott catheter in place.  We will work on removal.  With his current 

yeast dermatitis urinary incontinence would be an issue.  Patient was 

performing straight catheterization prior to this admission.  I will discuss 

with the patient and see if he wishes to return to that practice.


December 2, 2018-with regard to yesterday's note, the nurse updated me and 

reported significant bleeding last week when the patient return to self-

catheterization.  It has become increasingly harder to catheterize the patient.

  In fact a coud catheter was required.  The patient reports that his 

urologist stated that he was close to a permanent catheter before.  With this 

in mind I will leave the catheter in place.  I will also discontinue his Flomax.


12/3/2018-as above


12/4/2018-continue Scott catheter at this time.  At some point he needs follow-

up with urology to determine whether he should have a suprapubic catheter or 

not.








(3) Cystitis


Is this a current diagnosis for this admission?: Yes   


Plan: 


11/17- Was on suppresive Abx daily. Resumed bactrim ds BID. Urine culture with 

gram neg bacilli. Final ID and Sensitivities pending.


11/18/2018-discontinue Bactrim.  Continue Rocephin as ordered.  The patient 

will likely need a different suppressive antibiotic.


11/19/2018-culture positive for Klebsiella.  Resistant to Bactrim.  Currently 

on ceftriaxone.


12/1/2018-antibiotic therapy completed.  Cystitis resolved.











(4) Coronary artery disease with hx of myocardial infarct w/o hx of CABG


Is this a current diagnosis for this admission?: Yes   


Plan: 


Currently stable.  No cardiac symptoms.  Continue medications as ordered.


11/17- No change in medications


11/18/2018-no cardiac symptoms.  Continue current regimen.


11/19/2018-doing well.  Asymptomatic.  Continue current treatment plan.


12/1/2018-patient has been stable with no acute coronary symptoms.  Continue 

current treatment plan.


December 2, 2018-continue current regimen


12/3/2018-as above


12/4/2018-no changes








(5) COPD (chronic obstructive pulmonary disease)


Qualifiers: 


   COPD type: unspecified COPD   Qualified Code(s): J44.9 - Chronic obstructive 

pulmonary disease, unspecified   


Is this a current diagnosis for this admission?: Yes   


Plan: 


The patient has history of chronic obstructive pulmonary disease but is 

currently asymptomatic.  I will continue his home regimen.  Oxygen will be 

available for any hypoxia.  DuoNeb nebulizer therapy will be available if 

needed.





11/16/2018-no dyspnea or wheezing.  Continue current regimen as ordered.





11/17- breathing comfortably. No change in treatment plan.


11/18/2018-patient is stable.  Continue current regimen.


11/19/2018-breathing is comfortable.  There is no respiratory distress.  

Continue current


12/1/2018-no evidence of respiratory distress.  Continue regimen as ordered.


December 2, 2018-no changes in his current treatment plan


12/3/2018-patient stable.  No changes.


12/4/2018-continue current regimen








(6) Diabetes mellitus, insulin dependent (IDDM), uncontrolled


Qualifiers: 


   Glycemic state: with hyperglycemia   Qualified Code(s): E10.65 - Type 1 

diabetes mellitus with hyperglycemia   


Is this a current diagnosis for this admission?: Yes   


Plan: 


I will continue the patient's current insulin regimen.  For tonight he will 

receive a smaller dose of his long-acting insulin because he will be n.p.o. for 

most of the day.  He will be on a Humalog sliding scale as well.  A hemoglobin 

A1c is pending for further clarification of the patient's level of control.





His glucose readings are still high.  His hemoglobin A1c was 10.6.  I have 

increased his Levemir to 35 units daily.


11/17- Increased lantus to 40 units daily. continue sliding scale.





11/18/2018-still with some glucose instability.  We will monitor the pattern 

for another day and probably make adjustments in his Lantus.  He may need twice 

daily dosing.


11/19/2018-glucoses are slightly better.  He does tend to increase as the day 

goes on.  His glucose this morning was in fact normal.  May need to add a low-

dose of Lantus in the morning.  We will continue on his current dose for today.


12/1/2018-the patient has been on sliding scale only for several days.  He 

still exhibits glucose readings over 200.  Low-dose Lantus trial will again be 

instituted.


December 2, 2018-as noted above a small dose of Lantus was added to the patient'

s regimen.  Subsequent most of his blood sugars have been less than 200.  

Continue to monitor and continue sliding scale.


12/3/2018-continue current regimen


12/4/2018-last 5 or 6 glucose checks between 100 -150.  No changes at this time.








(7) Hypothyroidism


Qualifiers: 


   Hypothyroidism type: unspecified   Qualified Code(s): E03.9 - Hypothyroidism

, unspecified   


Is this a current diagnosis for this admission?: Yes   





(8) Bipolar 1 disorder


Is this a current diagnosis for this admission?: Yes   





(9) Constipation


Qualifiers: 


   Constipation type: drug induced constipation   Qualified Code(s): K59.03 - 

Drug induced constipation   


Is this a current diagnosis for this admission?: Yes   





(10) Hydrocephalus


Is this a current diagnosis for this admission?: Yes   





(11) Spina bifida


Qualifiers: 


   Spinal region: unspecified   Presence of hydrocephalus: unspecified 

hydrocephalus presence   Qualified Code(s): Q05.9 - Spina bifida, unspecified   


Is this a current diagnosis for this admission?: Yes   





(12) Hyponatremia


Is this a current diagnosis for this admission?: Yes   





- Time


Time Spent with patient: 15-24 minutes


Medications reviewed and adjusted accordingly: Yes


Anticipated discharge: SNF

## 2018-12-05 RX ADMIN — CYCLOBENZAPRINE HYDROCHLORIDE PRN MG: 10 TABLET, FILM COATED ORAL at 17:27

## 2018-12-05 RX ADMIN — ASPRIN AND EXTENDED-RELEASE DIPYRIDAMOLE SCH CAP.SR: 25; 200 CAPSULE ORAL at 10:31

## 2018-12-05 RX ADMIN — OXCARBAZEPINE SCH MG: 150 TABLET, FILM COATED ORAL at 23:54

## 2018-12-05 RX ADMIN — BACLOFEN PRN MG: 10 TABLET ORAL at 10:29

## 2018-12-05 RX ADMIN — DOCUSATE SODIUM SCH: 100 CAPSULE, LIQUID FILLED ORAL at 17:27

## 2018-12-05 RX ADMIN — METOPROLOL SUCCINATE SCH MG: 25 TABLET, EXTENDED RELEASE ORAL at 10:33

## 2018-12-05 RX ADMIN — BENZTROPINE MESYLATE SCH MG: 1 TABLET ORAL at 17:27

## 2018-12-05 RX ADMIN — INSULIN GLARGINE SCH UNITS: 100 INJECTION, SOLUTION SUBCUTANEOUS at 23:54

## 2018-12-05 RX ADMIN — TIOTROPIUM BROMIDE SCH INHALER: 18 CAPSULE ORAL; RESPIRATORY (INHALATION) at 10:31

## 2018-12-05 RX ADMIN — OXYCODONE HYDROCHLORIDE PRN MG: 5 TABLET ORAL at 23:54

## 2018-12-05 RX ADMIN — DIVALPROEX SODIUM SCH MG: 250 TABLET, FILM COATED, EXTENDED RELEASE ORAL at 10:30

## 2018-12-05 RX ADMIN — POTASSIUM CHLORIDE SCH MEQ: 750 TABLET, FILM COATED, EXTENDED RELEASE ORAL at 10:29

## 2018-12-05 RX ADMIN — LANSOPRAZOLE SCH MG: 30 TABLET, ORALLY DISINTEGRATING, DELAYED RELEASE ORAL at 10:30

## 2018-12-05 RX ADMIN — LISINOPRIL SCH MG: 5 TABLET ORAL at 10:29

## 2018-12-05 RX ADMIN — SIMVASTATIN SCH MG: 40 TABLET, FILM COATED ORAL at 23:55

## 2018-12-05 RX ADMIN — BUSPIRONE HYDROCHLORIDE SCH MG: 10 TABLET ORAL at 23:54

## 2018-12-05 RX ADMIN — BENZTROPINE MESYLATE SCH MG: 1 TABLET ORAL at 10:36

## 2018-12-05 RX ADMIN — BUSPIRONE HYDROCHLORIDE SCH MG: 10 TABLET ORAL at 10:29

## 2018-12-05 RX ADMIN — FLUOXETINE SCH MG: 20 CAPSULE ORAL at 10:32

## 2018-12-05 RX ADMIN — OXYCODONE HYDROCHLORIDE PRN MG: 5 TABLET ORAL at 05:29

## 2018-12-05 RX ADMIN — CLOTRIMAZOLE SCH APPLIC: 10 CREAM TOPICAL at 17:27

## 2018-12-05 RX ADMIN — ASPRIN AND EXTENDED-RELEASE DIPYRIDAMOLE SCH CAP.SR: 25; 200 CAPSULE ORAL at 17:26

## 2018-12-05 RX ADMIN — OXYCODONE HYDROCHLORIDE PRN MG: 5 TABLET ORAL at 10:28

## 2018-12-05 RX ADMIN — CLOTRIMAZOLE SCH: 10 CREAM TOPICAL at 13:34

## 2018-12-05 RX ADMIN — LEVOTHYROXINE SODIUM SCH MG: 25 TABLET ORAL at 05:29

## 2018-12-05 RX ADMIN — INSULIN LISPRO PRN UNIT: 100 INJECTION, SOLUTION INTRAVENOUS; SUBCUTANEOUS at 11:46

## 2018-12-05 RX ADMIN — DOCUSATE SODIUM SCH MG: 100 CAPSULE, LIQUID FILLED ORAL at 10:29

## 2018-12-05 RX ADMIN — OXCARBAZEPINE SCH MG: 150 TABLET, FILM COATED ORAL at 10:31

## 2018-12-05 NOTE — PDOC PROGRESS REPORT
Subjective


Progress Note for:: 12/05/18


Subjective:: 





Still with significant pain in his right leg.  He had surgical repair of the 

fracture yesterday.


11/17- Better today. Pain Better. Now Constipated.


11/18/2018-still complaining of pain in the right leg.  He has not had a bowel 

movement and is distended.


11/19/2018-sitting up in the chair.  He is feeling better.  He still has pain 

in his right leg but it is not as bad.  He did have a good response to the 

aggressive bowel regimen.


12/1/2018-patient is resting comfortably.  Right leg swelling has resolved.  A 

new issue is the erythematous rash in the perianal area extending through the 

perineum into both groin areas.  He reports that it is uncomfortable and very 

itchy.


December 2, 2018-patient is concerned about his discharge plan.


12/3/2018-the patient is in fact resting comfortably in bed.  His wife is 

present at the bedside.


12/4/2018-the patient is sitting up in the chair.  He just finished breakfast 

and is drinking coffee.  He has no acute complaints.  12/5/2018-the patient is 

sitting up at the edge of the bed having breakfast.


He appears very comfortable.  He is in excellent spirits.


Reason For Visit: 


FX OF RIGHT LEG








Physical Exam


Vital Signs: 


 











Temp Pulse Resp BP Pulse Ox


 


 98.7 F   79   17   114/57 L  100 


 


 12/05/18 11:50  12/05/18 11:50  12/05/18 11:50  12/05/18 11:50  12/05/18 11:50








 Intake & Output











 12/04/18 12/05/18 12/06/18





 06:59 06:59 06:59


 


Intake Total 665 1395 


 


Output Total 1955 1400 


 


Balance -1290 -5 


 


Weight 61.1 kg 60.7 kg 











General appearance: PRESENT: no acute distress, cooperative, thin, well-

developed


Mouth exam: PRESENT: moist, tongue midline


Respiratory exam: PRESENT: clear to auscultation david, symmetrical, unlabored.  

ABSENT: chest wall tenderness, crackles, rales, rhonchi, stridor, wheezes


Cardiovascular exam: PRESENT: RRR, +S1, +S2


GI/Abdominal exam: PRESENT: normal bowel sounds, soft.  ABSENT: distended, 

guarding, tenderness


Extremities exam: PRESENT: other - Incision right knee.  ABSENT: pedal edema


Neurological exam: PRESENT: alert, awake, oriented to person, oriented to place

, oriented to time, oriented to situation, CN II-XII grossly intact


Psychiatric exam: PRESENT: appropriate affect, normal mood





Results


Laboratory Results: 


 





 12/02/18 04:45 





 12/03/18 07:57 








Impressions: 


 





Chest X-Ray  11/15/18 00:00


IMPRESSION:  No acute findings


 








Fluoroscopy  11/15/18 00:00


IMPRESSION:  ORIF tibial fracture.  Refer to operative note for further 

information.


 








Pelvis X-Ray  11/15/18 00:00


IMPRESSION:


 


Postsurgical changes with heterotopic bone formation of the


proximal femurs bilaterally. No radiographic evidence for acute


fracture. Correlate with any history of inability to ambulate.


 


 


 2011 [employer]- All Rights Reserved


 








Tibia/Fibula X-Ray  11/15/18 00:00


IMPRESSION:  ORIF tibial fracture.  Refer to operative note for further 

information.


 








Ankle X-Ray  11/15/18 06:35


IMPRESSION:


 


Displaced oblique fractures of the distal fibular and tibial


diaphyses as described above.


 








Head CT  11/15/18 06:35


IMPRESSION:


 


1. There is no evidence of acute intracranial pathology.


2. Stable enlarged ventricles out of proportion to the sulci


which can be seen with normal pressure hydrocephalus.


3. 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


infarcts.


 














Assessment & Plan





- Diagnosis


(1) Displaced fracture of tibia


Qualifiers: 


   Encounter type: subsequent encounter   Tibia location: distal physis (incl. 

Salter-Prieto)   Laterality: right 


Is this a current diagnosis for this admission?: Yes   


Plan: 


The patient sustained a fracture of the right tibia and fibula after a fall 

today.  Orthopedic surgery has seen the patient and will be taking him to the 

OR for surgical repair.  From a medical standpoint he is cleared for surgery.  

He does not have a history of myocardial infarction with a negative stress test 

in February.  Dr. Frank will be seeing the patient as well.





11/16/2018-the patient had surgical repair of his right leg yesterday.  He 

still in pain.  Narcotic analgesia is available.


11/17- Leg hurts today but the leg is hanging over the side of the bed. I 

encouraged elevation on a pillow.


11/18/2018-management per orthopedic surgery





11/19/2018-still having some pain.  I will add ice packs in addition to his 

pain medications.  He will likely need rehab.


12/1/2018-patient is resting quite comfortably.  It has been 2 weeks since his 

surgery.  It is likely his staples can be removed.  I will defer to orthopedic 

surgery.  There is no further swelling in the knee or leg.


December 2, 2018-stable.  Please also see the orthopedic surgery note.


12/3/2018-awaiting placement for additional rehab.


12/4/2018-I did speak to physical therapy after they worked with the patient.  

He is toe-touch at this point but still very limited.  They recommend skilled 

therapy after discharge.


12/5/2018-patient continues physical and occupational therapy.  Will follow up 

with orthopedic surgery as an outpatient.








(2) Neurogenic bladder


Is this a current diagnosis for this admission?: Yes   


Plan: 


As noted above the patient straight catheterizes himself regularly.  He does 

report recurrent urinary tract infections.  He is currently on Bactrim for an 

episode of cystitis.  I will continue his Bactrim as an inpatient.  Urinalysis 

is pending.





The patient a Scott catheter placed in the OR.  We will leave it in place.  The 

urine is cloudy.  Culture is pending and preliminarily appears positive.  I 

will resume his Bactrim that he takes as an outpatient.  Once the sensitivities 

are available I can modify his antibiotic therapy appropriately.


11/17- back on Bactrim. Urine Cx still pending.


11/18/2018-cystitis with Klebsiella pneumonia.  Resistant to Bactrim.  The 

patient is on Rocephin.  The Klebsiella is sensitive to Rocephin.


11/19/2018-the patient has a chronic neurogenic bladder.  He self catheterizes.

  He is on suppressive antibiotics of Bactrim.  He developed a Klebsiella 

infection and it is resistant to Bactrim and intermediate to nitrofurantoin.  

The patient will receive a total of 5 doses of ceftriaxone.  The bacteria is 

sensitive and this should cover his infection.  His urologist will have to 

decide on a different suppressive regimen.


12/1/2018-Scott catheter in place.  We will work on removal.  With his current 

yeast dermatitis urinary incontinence would be an issue.  Patient was 

performing straight catheterization prior to this admission.  I will discuss 

with the patient and see if he wishes to return to that practice.


December 2, 2018-with regard to yesterday's note, the nurse updated me and 

reported significant bleeding last week when the patient return to self-

catheterization.  It has become increasingly harder to catheterize the patient.

  In fact a coud catheter was required.  The patient reports that his 

urologist stated that he was close to a permanent catheter before.  With this 

in mind I will leave the catheter in place.  I will also discontinue his Flomax.


12/3/2018-as above


12/4/2018-continue Scott catheter at this time.  At some point he needs follow-

up with urology to determine whether he should have a suprapubic catheter or 

not.


12/5/2018-due to likely irritation or increased size of his prostate the 

patient now has an indwelling Scott catheter.  His urologist stated that this 

was inevitable.  Follow-up with urology after discharge.








(3) Cystitis


Is this a current diagnosis for this admission?: Yes   


Plan: 


11/17- Was on suppresive Abx daily. Resumed bactrim ds BID. Urine culture with 

gram neg bacilli. Final ID and Sensitivities pending.


11/18/2018-discontinue Bactrim.  Continue Rocephin as ordered.  The patient 

will likely need a different suppressive antibiotic.


11/19/2018-culture positive for Klebsiella.  Resistant to Bactrim.  Currently 

on ceftriaxone.


12/1/2018-antibiotic therapy completed.  Cystitis resolved.











(4) Coronary artery disease with hx of myocardial infarct w/o hx of CABG


Is this a current diagnosis for this admission?: Yes   


Plan: 


Currently stable.  No cardiac symptoms.  Continue medications as ordered.


11/17- No change in medications


11/18/2018-no cardiac symptoms.  Continue current regimen.


11/19/2018-doing well.  Asymptomatic.  Continue current treatment plan.


12/1/2018-patient has been stable with no acute coronary symptoms.  Continue 

current treatment plan.


December 2, 2018-continue current regimen


12/3/2018-as above


12/4/2018-no changes


From 518-continue current regimen








(5) COPD (chronic obstructive pulmonary disease)


Qualifiers: 


   COPD type: unspecified COPD   Qualified Code(s): J44.9 - Chronic obstructive 

pulmonary disease, unspecified   


Is this a current diagnosis for this admission?: Yes   


Plan: 


The patient has history of chronic obstructive pulmonary disease but is 

currently asymptomatic.  I will continue his home regimen.  Oxygen will be 

available for any hypoxia.  DuoNeb nebulizer therapy will be available if 

needed.





11/16/2018-no dyspnea or wheezing.  Continue current regimen as ordered.





11/17- breathing comfortably. No change in treatment plan.


11/18/2018-patient is stable.  Continue current regimen.


11/19/2018-breathing is comfortable.  There is no respiratory distress.  

Continue current


12/1/2018-no evidence of respiratory distress.  Continue regimen as ordered.


December 2, 2018-no changes in his current treatment plan


12/3/2018-patient stable.  No changes.


12/4/2018-continue current regimen


12/5/2018-the patient has been comfortable and has not exhibited difficulty 

breathing.  Continue current regimen.








(6) Diabetes mellitus, insulin dependent (IDDM), uncontrolled


Qualifiers: 


   Glycemic state: with hyperglycemia   Qualified Code(s): E10.65 - Type 1 

diabetes mellitus with hyperglycemia   


Is this a current diagnosis for this admission?: Yes   


Plan: 


I will continue the patient's current insulin regimen.  For tonight he will 

receive a smaller dose of his long-acting insulin because he will be n.p.o. for 

most of the day.  He will be on a Humalog sliding scale as well.  A hemoglobin 

A1c is pending for further clarification of the patient's level of control.





His glucose readings are still high.  His hemoglobin A1c was 10.6.  I have 

increased his Levemir to 35 units daily.


11/17- Increased lantus to 40 units daily. continue sliding scale.





11/18/2018-still with some glucose instability.  We will monitor the pattern 

for another day and probably make adjustments in his Lantus.  He may need twice 

daily dosing.


11/19/2018-glucoses are slightly better.  He does tend to increase as the day 

goes on.  His glucose this morning was in fact normal.  May need to add a low-

dose of Lantus in the morning.  We will continue on his current dose for today.


12/1/2018-the patient has been on sliding scale only for several days.  He 

still exhibits glucose readings over 200.  Low-dose Lantus trial will again be 

instituted.


December 2, 2018-as noted above a small dose of Lantus was added to the patient'

s regimen.  Subsequent most of his blood sugars have been less than 200.  

Continue to monitor and continue sliding scale.


12/3/2018-continue current regimen


12/4/2018-last 5 or 6 glucose checks between 100 -150.  No changes at this time.


12/5/2018-the patient has usually exhibited good glucose control.  This morning 

he had an elevated glucose reading but this could be spurious.








(7) Hypothyroidism


Qualifiers: 


   Hypothyroidism type: unspecified   Qualified Code(s): E03.9 - Hypothyroidism

, unspecified   


Is this a current diagnosis for this admission?: Yes   


Plan: 


We will continue his current dose of levothyroxine.  He takes 25 mcg daily.  No 

clinical indication for change.











(8) Bipolar 1 disorder


Is this a current diagnosis for this admission?: Yes   


Plan: 


Patient is on multiple medications.  We will continue his current regimen.  

Currently he appears asymptomatic.  I will obtain more detailed information on 

the history of his mental illness.





11/16/2018-the patient is slightly agitated but I believe this is from his 

pain.  Continue current medications as ordered.


11/17/18- Calm today. does respond to pain appropriately. In good spirits.


11/18/2018-seems uncomfortable today.  This is likely due to constipation.  No 

change in current regimen.


12/1/2018-patient is asymptomatic today.  Continue current regimen.


December 2, 2018-as noted above the patient did have acute agitation 

approximately 2 weeks ago.  This was in the initial postoperative window and 

lasted several days.  He is much better now.  Continue current regimen.


12/5/2018-most likely suffered acute postoperative symptoms but he has been 

extremely pleasant and back at baseline for over 1 week.  Continue current 

medications.








(9) Constipation


Qualifiers: 


   Constipation type: drug induced constipation   Qualified Code(s): K59.03 - 

Drug induced constipation   


Is this a current diagnosis for this admission?: Yes   


Plan: 


11/17- Likely due to pain meds. Will give Miralax now and make available PRN. 

Start colace 100 bid as well.


11/18/2018-still with no bowel movement.  Distended and tympanitic.  I will 

administer a bottle of magnesium citrate and a more aggressive bowel regimen.


11/19/2018-he did have a good response to aggressive regimen yesterday.  We 

will continue stool softeners to try and avoid opiate-induced constipation.


12/1/2018-continue current regimen of stool softeners.


December 2, 2018-no recurrence at this time.








(10) Hydrocephalus


Is this a current diagnosis for this admission?: Yes   


Plan: 


The patient has normal pressure hydrocephalus.  He also has spina bifida.  He 

is awaiting neurosurgical consultation for shunt placement and surgical 

intervention for his spinal cord.  Unfortunately the significant gait 

abnormality has caused multiple falls.  He has worked with physical therapy.  

He does have a walker but it was difficult to use in the hotel that they are 

presently staying in.





11/16/2018-currently stable.  Awaiting neurosurgical evaluation for shunt 

placement.


11/17-stable no change


11/18/2018-stable no change.  Continue current regimen.


11/19/2018-no change in treatment plan.


12/1/2018-no change in his clinical presentation.  Continue current treatment 

plan.


December 2, 2018-continue current plan.  Patient awaiting neurosurgical 

evaluation.


12/5/[id number] with neurosurgery as outpatient








(11) Spina bifida


Qualifiers: 


   Spinal region: unspecified   Presence of hydrocephalus: unspecified 

hydrocephalus presence   Qualified Code(s): Q05.9 - Spina bifida, unspecified   


Is this a current diagnosis for this admission?: Yes   


Plan: 


The patient has a history of spina bifida with a "tethered "spinal cord.  This 

has caused issues with his gait as noted above.  He also has a neurogenic 

bladder.  He straight catheterizes himself regularly.  Postoperatively we will 

leave a Scott catheter in place but look to remove it as soon as possible.





11/16/2018-currently stable.  Awaiting neurosurgical evaluation.


11/17-stable no change continue current regimen


11/18/2018-as above


11/19/2018-continue current plan.


12/1/2018-chronic and stable.  No changes in treatment plan.


Some second 2018-patient awaiting neurosurgical consultation.  Continue current 

regimen at this time.


12/5/[id number] with neurosurgery as outpatient








(12) Hyponatremia


Is this a current diagnosis for this admission?: Yes   


Plan: 


December 2, 2018-patient serum sodium was low at the time of admission.  He 

subsequently normalized and is low again today.  Before making any changes I 

will recheck his serum sodium tomorrow.


12/5/2018-this is been an ongoing issue.  His sodium does fluctuate.  He does 

appear to respond to fluid restriction.  I will reinstitute fluid restriction.  

I am not sure of the exact etiology or natremia.  He was hyponatremic at the 

time of admission.








- Time


Time Spent with patient: 15-24 minutes


Medications reviewed and adjusted accordingly: Yes

## 2018-12-06 RX ADMIN — BENZTROPINE MESYLATE SCH MG: 1 TABLET ORAL at 17:59

## 2018-12-06 RX ADMIN — ASPRIN AND EXTENDED-RELEASE DIPYRIDAMOLE SCH CAP.SR: 25; 200 CAPSULE ORAL at 17:59

## 2018-12-06 RX ADMIN — LISINOPRIL SCH MG: 5 TABLET ORAL at 09:42

## 2018-12-06 RX ADMIN — TIOTROPIUM BROMIDE SCH INHALER: 18 CAPSULE ORAL; RESPIRATORY (INHALATION) at 09:45

## 2018-12-06 RX ADMIN — ASPRIN AND EXTENDED-RELEASE DIPYRIDAMOLE SCH CAP.SR: 25; 200 CAPSULE ORAL at 09:44

## 2018-12-06 RX ADMIN — LANSOPRAZOLE SCH: 30 TABLET, ORALLY DISINTEGRATING, DELAYED RELEASE ORAL at 09:46

## 2018-12-06 RX ADMIN — DOCUSATE SODIUM SCH MG: 100 CAPSULE, LIQUID FILLED ORAL at 09:42

## 2018-12-06 RX ADMIN — SIMVASTATIN SCH MG: 40 TABLET, FILM COATED ORAL at 21:20

## 2018-12-06 RX ADMIN — BUSPIRONE HYDROCHLORIDE SCH MG: 10 TABLET ORAL at 09:42

## 2018-12-06 RX ADMIN — DIVALPROEX SODIUM SCH MG: 250 TABLET, FILM COATED, EXTENDED RELEASE ORAL at 09:42

## 2018-12-06 RX ADMIN — CLOTRIMAZOLE SCH APPLIC: 10 CREAM TOPICAL at 17:59

## 2018-12-06 RX ADMIN — METOPROLOL SUCCINATE SCH MG: 25 TABLET, EXTENDED RELEASE ORAL at 09:42

## 2018-12-06 RX ADMIN — OXCARBAZEPINE SCH MG: 150 TABLET, FILM COATED ORAL at 21:20

## 2018-12-06 RX ADMIN — BENZTROPINE MESYLATE SCH MG: 1 TABLET ORAL at 09:44

## 2018-12-06 RX ADMIN — INSULIN GLARGINE SCH: 100 INJECTION, SOLUTION SUBCUTANEOUS at 21:21

## 2018-12-06 RX ADMIN — OXCARBAZEPINE SCH MG: 150 TABLET, FILM COATED ORAL at 09:43

## 2018-12-06 RX ADMIN — LEVOTHYROXINE SODIUM SCH MG: 25 TABLET ORAL at 05:47

## 2018-12-06 RX ADMIN — DOCUSATE SODIUM SCH MG: 100 CAPSULE, LIQUID FILLED ORAL at 17:59

## 2018-12-06 RX ADMIN — CLOTRIMAZOLE SCH APPLIC: 10 CREAM TOPICAL at 09:43

## 2018-12-06 RX ADMIN — FLUOXETINE SCH MG: 20 CAPSULE ORAL at 09:44

## 2018-12-06 RX ADMIN — CLOTRIMAZOLE SCH: 10 CREAM TOPICAL at 18:08

## 2018-12-06 RX ADMIN — BUSPIRONE HYDROCHLORIDE SCH MG: 10 TABLET ORAL at 21:20

## 2018-12-06 RX ADMIN — INSULIN LISPRO PRN UNIT: 100 INJECTION, SOLUTION INTRAVENOUS; SUBCUTANEOUS at 18:11

## 2018-12-06 RX ADMIN — POTASSIUM CHLORIDE SCH MEQ: 750 TABLET, FILM COATED, EXTENDED RELEASE ORAL at 09:42

## 2018-12-07 LAB
ALBUMIN SERPL-MCNC: 4.4 G/DL (ref 3.5–5)
ALP SERPL-CCNC: 171 U/L (ref 38–126)
ALT SERPL-CCNC: 23 U/L (ref 21–72)
ANION GAP SERPL CALC-SCNC: 17 MMOL/L (ref 5–19)
AST SERPL-CCNC: 21 U/L (ref 17–59)
BILIRUB DIRECT SERPL-MCNC: 0.4 MG/DL (ref 0–0.4)
BILIRUB SERPL-MCNC: 0.5 MG/DL (ref 0.2–1.3)
BUN SERPL-MCNC: 9 MG/DL (ref 7–20)
CALCIUM: 9.7 MG/DL (ref 8.4–10.2)
CHLORIDE SERPL-SCNC: 85 MMOL/L (ref 98–107)
CO2 SERPL-SCNC: 23 MMOL/L (ref 22–30)
GLUCOSE SERPL-MCNC: 133 MG/DL (ref 75–110)
POTASSIUM SERPL-SCNC: 5 MMOL/L (ref 3.6–5)
PROT SERPL-MCNC: 7.6 G/DL (ref 6.3–8.2)
SODIUM SERPL-SCNC: 124.8 MMOL/L (ref 137–145)

## 2018-12-07 RX ADMIN — CLOTRIMAZOLE SCH APPLIC: 10 CREAM TOPICAL at 18:03

## 2018-12-07 RX ADMIN — BUSPIRONE HYDROCHLORIDE SCH MG: 10 TABLET ORAL at 09:24

## 2018-12-07 RX ADMIN — LISINOPRIL SCH MG: 5 TABLET ORAL at 09:23

## 2018-12-07 RX ADMIN — OXYCODONE HYDROCHLORIDE PRN MG: 5 TABLET ORAL at 14:30

## 2018-12-07 RX ADMIN — BUSPIRONE HYDROCHLORIDE SCH MG: 10 TABLET ORAL at 21:41

## 2018-12-07 RX ADMIN — SIMVASTATIN SCH MG: 40 TABLET, FILM COATED ORAL at 21:41

## 2018-12-07 RX ADMIN — DOCUSATE SODIUM SCH MG: 100 CAPSULE, LIQUID FILLED ORAL at 09:24

## 2018-12-07 RX ADMIN — OXYCODONE HYDROCHLORIDE PRN MG: 5 TABLET ORAL at 21:41

## 2018-12-07 RX ADMIN — LANSOPRAZOLE SCH MG: 30 TABLET, ORALLY DISINTEGRATING, DELAYED RELEASE ORAL at 09:23

## 2018-12-07 RX ADMIN — TIOTROPIUM BROMIDE SCH INHALER: 18 CAPSULE ORAL; RESPIRATORY (INHALATION) at 09:25

## 2018-12-07 RX ADMIN — OXCARBAZEPINE SCH MG: 150 TABLET, FILM COATED ORAL at 21:41

## 2018-12-07 RX ADMIN — METOPROLOL SUCCINATE SCH MG: 25 TABLET, EXTENDED RELEASE ORAL at 09:24

## 2018-12-07 RX ADMIN — INSULIN GLARGINE SCH UNITS: 100 INJECTION, SOLUTION SUBCUTANEOUS at 21:41

## 2018-12-07 RX ADMIN — LEVOTHYROXINE SODIUM SCH MG: 25 TABLET ORAL at 05:12

## 2018-12-07 RX ADMIN — DIVALPROEX SODIUM SCH MG: 250 TABLET, FILM COATED, EXTENDED RELEASE ORAL at 09:24

## 2018-12-07 RX ADMIN — DOCUSATE SODIUM SCH MG: 100 CAPSULE, LIQUID FILLED ORAL at 18:02

## 2018-12-07 RX ADMIN — POTASSIUM CHLORIDE SCH MEQ: 750 TABLET, FILM COATED, EXTENDED RELEASE ORAL at 09:23

## 2018-12-07 RX ADMIN — FLUOXETINE SCH MG: 20 CAPSULE ORAL at 09:25

## 2018-12-07 RX ADMIN — ASPRIN AND EXTENDED-RELEASE DIPYRIDAMOLE SCH CAP.SR: 25; 200 CAPSULE ORAL at 18:02

## 2018-12-07 RX ADMIN — BENZTROPINE MESYLATE SCH MG: 1 TABLET ORAL at 18:02

## 2018-12-07 RX ADMIN — BENZTROPINE MESYLATE SCH MG: 1 TABLET ORAL at 09:25

## 2018-12-07 RX ADMIN — CLOTRIMAZOLE SCH APPLIC: 10 CREAM TOPICAL at 09:27

## 2018-12-07 RX ADMIN — OXCARBAZEPINE SCH MG: 150 TABLET, FILM COATED ORAL at 09:26

## 2018-12-07 RX ADMIN — ASPRIN AND EXTENDED-RELEASE DIPYRIDAMOLE SCH CAP.SR: 25; 200 CAPSULE ORAL at 09:25

## 2018-12-07 NOTE — PDOC PROGRESS REPORT
Subjective


Progress Note for:: 12/07/18 - seen on rounds this afternoon


Subjective:: 





states his legs hurt- states this has been going on for weeks now.  denies any 

new acute complaints


Reason For Visit: 


FX OF RIGHT LEG








Physical Exam


Vital Signs: 


 











Temp Pulse Resp BP Pulse Ox


 


 98.2 F   90   15   113/75   100 


 


 12/07/18 11:25  12/07/18 11:25  12/07/18 11:25  12/07/18 11:25  12/07/18 11:25








 Intake & Output











 12/06/18 12/07/18 12/08/18





 06:59 06:59 06:59


 


Intake Total 1516 1491 


 


Output Total 1365 1530 


 


Balance 151 -39 


 


Weight 133 lb 13.129 oz 133 lb 13.129 oz 











General appearance: PRESENT: no acute distress


Head exam: PRESENT: atraumatic, normocephalic


Eye exam: PRESENT: EOMI.  ABSENT: conjunctival injection, scleral icterus


Ear exam: PRESENT: normal external ear exam


Respiratory exam: PRESENT: clear to auscultation david, symmetrical


Cardiovascular exam: PRESENT: +S1, +S2


Pulses: PRESENT: +1 pedal pulses bilateral


GI/Abdominal exam: PRESENT: normal bowel sounds, soft.  ABSENT: tenderness


Extremities exam: ABSENT: pedal edema


Musculoskeletal exam: PRESENT: other - tenderness of right lower LE- dressing 

noted. no erythema of surrounding skin


Neurological exam: PRESENT: alert, awake, oriented to person, oriented to place

, oriented to time, oriented to situation, CN II-XII grossly intact


Skin exam: PRESENT: dry, warm





Results


Laboratory Results: 


 





 12/02/18 04:45 





 12/07/18 09:33 





 











  12/07/18





  09:33


 


Sodium  124.8 L


 


Potassium  5.0


 


Chloride  85 L


 


Carbon Dioxide  23


 


Anion Gap  17


 


BUN  9


 


Creatinine  0.80


 


Est GFR ( Amer)  > 60


 


Est GFR (Non-Af Amer)  > 60


 


Glucose  133 H


 


Calcium  9.7


 


Total Bilirubin  0.5


 


AST  21


 


ALT  23


 


Alkaline Phosphatase  171 H


 


Total Protein  7.6


 


Albumin  4.4











Impressions: 


 





Chest X-Ray  11/15/18 00:00


IMPRESSION:  No acute findings


 








Fluoroscopy  11/15/18 00:00


IMPRESSION:  ORIF tibial fracture.  Refer to operative note for further 

information.


 








Pelvis X-Ray  11/15/18 00:00


IMPRESSION:


 


Postsurgical changes with heterotopic bone formation of the


proximal femurs bilaterally. No radiographic evidence for acute


fracture. Correlate with any history of inability to ambulate.


 


 


 2011 [employer]- All Rights Reserved


 








Tibia/Fibula X-Ray  11/15/18 00:00


IMPRESSION:  ORIF tibial fracture.  Refer to operative note for further 

information.


 








Ankle X-Ray  11/15/18 06:35


IMPRESSION:


 


Displaced oblique fractures of the distal fibular and tibial


diaphyses as described above.


 








Head CT  11/15/18 06:35


IMPRESSION:


 


1. There is no evidence of acute intracranial pathology.


2. Stable enlarged ventricles out of proportion to the sulci


which can be seen with normal pressure hydrocephalus.


3. 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


infarcts.


 














Assessment & Plan





- Diagnosis


(1) Coronary artery disease with hx of myocardial infarct w/o hx of CABG


Is this a current diagnosis for this admission?: Yes   





(2) Diabetes mellitus, insulin dependent (IDDM), uncontrolled


Qualifiers: 


   Glycemic state: with hyperglycemia   Qualified Code(s): E10.65 - Type 1 

diabetes mellitus with hyperglycemia   


Is this a current diagnosis for this admission?: Yes   





(3) Fracture of distal end of tibia


Qualifiers: 


   Encounter type: initial encounter   Fracture type: closed   Fracture 

morphology: other fracture   Laterality: right   Qualified Code(s): S82.391A - 

Other fracture of lower end of right tibia, initial encounter for closed 

fracture   


Is this a current diagnosis for this admission?: Yes   





(4) Fracture of distal fibula


Qualifiers: 


   Fracture type: closed   Fracture morphology: unspecified fracture morphology

   Laterality: right 


Is this a current diagnosis for this admission?: Yes   





(5) Neurogenic bladder


Is this a current diagnosis for this admission?: Yes   





(6) COPD (chronic obstructive pulmonary disease)


Qualifiers: 


   COPD type: unspecified COPD   Qualified Code(s): J44.9 - Chronic obstructive 

pulmonary disease, unspecified   


Is this a current diagnosis for this admission?: Yes   





(7) Chronic kidney disease, stage 2 (mild)


Is this a current diagnosis for this admission?: Yes   





(8) Hypertension


Qualifiers: 


   Hypertension type: essential hypertension   Qualified Code(s): I10 - 

Essential (primary) hypertension   


Is this a current diagnosis for this admission?: Yes   





(9) Hypothyroidism


Qualifiers: 


   Hypothyroidism type: unspecified   Qualified Code(s): E03.9 - Hypothyroidism

, unspecified   


Is this a current diagnosis for this admission?: Yes   





(10) Urinary tract infection


Qualifiers: 


   Urinary tract infection type: site unspecified   Hematuria presence: without 

hematuria   Qualified Code(s): N39.0 - Urinary tract infection, site not 

specified   


Is this a current diagnosis for this admission?: Yes   





- Plan Summary


Plan Summary: 





s/p right Le tib/fib fracture repair. he has been going through with PT.   he 

has been sitting in the hospital for 3 weeks now waiting for placement. CM is 

aware. given his recent surgery- he will need less than 30 days of rehab at 

this time


cystitis- completed course. 


no new complaints.  continue all other regimen at this time.

## 2018-12-07 NOTE — PDOC PROGRESS REPORT
Subjective


Progress Note for:: 12/06/18


Subjective:: 





Still with significant pain in his right leg.  He had surgical repair of the 

fracture yesterday.


11/17- Better today. Pain Better. Now Constipated.


11/18/2018-still complaining of pain in the right leg.  He has not had a bowel 

movement and is distended.


11/19/2018-sitting up in the chair.  He is feeling better.  He still has pain 

in his right leg but it is not as bad.  He did have a good response to the 

aggressive bowel regimen.


12/1/2018-patient is resting comfortably.  Right leg swelling has resolved.  A 

new issue is the erythematous rash in the perianal area extending through the 

perineum into both groin areas.  He reports that it is uncomfortable and very 

itchy.


December 2, 2018-patient is concerned about his discharge plan.


12/3/2018-the patient is in fact resting comfortably in bed.  His wife is 

present at the bedside.


12/4/2018-the patient is sitting up in the chair.  He just finished breakfast 

and is drinking coffee.  He has no acute complaints.  


12/5/2018-the patient is sitting up at the edge of the bed having breakfast.


He appears very comfortable.  He is in excellent spirits.


12/6/2018-the patient's wife is visiting.  He appears quite comfortable in bed.

  He is in no distress.





Reason For Visit: 


FX OF RIGHT LEG








Physical Exam


Vital Signs: 


 











Temp Pulse Resp BP Pulse Ox


 


 98.9 F   81   17   130/64 H  100 


 


 12/07/18 07:42  12/07/18 07:42  12/07/18 07:42  12/07/18 07:42  12/07/18 07:42








 Intake & Output











 12/06/18 12/07/18 12/08/18





 06:59 06:59 06:59


 


Intake Total 1516 1491 


 


Output Total 1365 1530 


 


Balance 151 -39 


 


Weight 60.7 kg 60.7 kg 











General appearance: PRESENT: no acute distress, cooperative, thin, well-

developed


Respiratory exam: PRESENT: clear to auscultation david, symmetrical, unlabored.  

ABSENT: accessory muscle use, rales, rhonchi, wheezes


GI/Abdominal exam: PRESENT: normal bowel sounds, soft.  ABSENT: tenderness


Extremities exam: ABSENT: pedal edema


Neurological exam: PRESENT: alert, awake, oriented to person, oriented to place

, oriented to situation


Psychiatric exam: PRESENT: appropriate affect, normal mood.  ABSENT: agitated, 

anxious





Results


Laboratory Results: 


 





 12/02/18 04:45 





 12/07/18 09:33 





 











  12/07/18





  09:33


 


Sodium  124.8 L


 


Potassium  5.0


 


Chloride  85 L


 


Carbon Dioxide  23


 


Anion Gap  17


 


BUN  9


 


Creatinine  0.80


 


Est GFR ( Amer)  > 60


 


Est GFR (Non-Af Amer)  > 60


 


Glucose  133 H


 


Calcium  9.7


 


Total Bilirubin  0.5


 


AST  21


 


ALT  23


 


Alkaline Phosphatase  171 H


 


Total Protein  7.6


 


Albumin  4.4











Impressions: 


 





Chest X-Ray  11/15/18 00:00


IMPRESSION:  No acute findings


 








Fluoroscopy  11/15/18 00:00


IMPRESSION:  ORIF tibial fracture.  Refer to operative note for further 

information.


 








Pelvis X-Ray  11/15/18 00:00


IMPRESSION:


 


Postsurgical changes with heterotopic bone formation of the


proximal femurs bilaterally. No radiographic evidence for acute


fracture. Correlate with any history of inability to ambulate.


 


 


 2011 [employer]- All Rights Reserved


 








Tibia/Fibula X-Ray  11/15/18 00:00


IMPRESSION:  ORIF tibial fracture.  Refer to operative note for further 

information.


 








Ankle X-Ray  11/15/18 06:35


IMPRESSION:


 


Displaced oblique fractures of the distal fibular and tibial


diaphyses as described above.


 








Head CT  11/15/18 06:35


IMPRESSION:


 


1. There is no evidence of acute intracranial pathology.


2. Stable enlarged ventricles out of proportion to the sulci


which can be seen with normal pressure hydrocephalus.


3. 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


infarcts.


 














Assessment & Plan





- Diagnosis


(1) Displaced fracture of tibia


Qualifiers: 


   Encounter type: subsequent encounter   Tibia location: distal physis (incl. 

Salter-Prieto)   Laterality: right 


Is this a current diagnosis for this admission?: Yes   


Plan: 


The patient sustained a fracture of the right tibia and fibula after a fall 

today.  Orthopedic surgery has seen the patient and will be taking him to the 

OR for surgical repair.  From a medical standpoint he is cleared for surgery.  

He does not have a history of myocardial infarction with a negative stress test 

in February.  Dr. Frank will be seeing the patient as well.





11/16/2018-the patient had surgical repair of his right leg yesterday.  He 

still in pain.  Narcotic analgesia is available.


11/17- Leg hurts today but the leg is hanging over the side of the bed. I 

encouraged elevation on a pillow.


11/18/2018-management per orthopedic surgery





11/19/2018-still having some pain.  I will add ice packs in addition to his 

pain medications.  He will likely need rehab.


12/1/2018-patient is resting quite comfortably.  It has been 2 weeks since his 

surgery.  It is likely his staples can be removed.  I will defer to orthopedic 

surgery.  There is no further swelling in the knee or leg.


December 2, 2018-stable.  Please also see the orthopedic surgery note.


12/3/2018-awaiting placement for additional rehab.


12/4/2018-I did speak to physical therapy after they worked with the patient.  

He is toe-touch at this point but still very limited.  They recommend skilled 

therapy after discharge.


12/5/2018-patient continues physical and occupational therapy.  Will follow up 

with orthopedic surgery as an outpatient.


12/6/2018-still working with physical therapy.  I believe he is only toe-touch 

at this time.








(2) Neurogenic bladder


Is this a current diagnosis for this admission?: Yes   


Plan: 


As noted above the patient straight catheterizes himself regularly.  He does 

report recurrent urinary tract infections.  He is currently on Bactrim for an 

episode of cystitis.  I will continue his Bactrim as an inpatient.  Urinalysis 

is pending.





The patient a Scott catheter placed in the OR.  We will leave it in place.  The 

urine is cloudy.  Culture is pending and preliminarily appears positive.  I 

will resume his Bactrim that he takes as an outpatient.  Once the sensitivities 

are available I can modify his antibiotic therapy appropriately.


11/17- back on Bactrim. Urine Cx still pending.


11/18/2018-cystitis with Klebsiella pneumonia.  Resistant to Bactrim.  The 

patient is on Rocephin.  The Klebsiella is sensitive to Rocephin.


11/19/2018-the patient has a chronic neurogenic bladder.  He self catheterizes.

  He is on suppressive antibiotics of Bactrim.  He developed a Klebsiella 

infection and it is resistant to Bactrim and intermediate to nitrofurantoin.  

The patient will receive a total of 5 doses of ceftriaxone.  The bacteria is 

sensitive and this should cover his infection.  His urologist will have to 

decide on a different suppressive regimen.


12/1/2018-Scott catheter in place.  We will work on removal.  With his current 

yeast dermatitis urinary incontinence would be an issue.  Patient was 

performing straight catheterization prior to this admission.  I will discuss 

with the patient and see if he wishes to return to that practice.


December 2, 2018-with regard to yesterday's note, the nurse updated me and 

reported significant bleeding last week when the patient return to self-

catheterization.  It has become increasingly harder to catheterize the patient.

  In fact a coud catheter was required.  The patient reports that his 

urologist stated that he was close to a permanent catheter before.  With this 

in mind I will leave the catheter in place.  I will also discontinue his Flomax.


12/3/2018-as above


12/4/2018-continue Scott catheter at this time.  At some point he needs follow-

up with urology to determine whether he should have a suprapubic catheter or 

not.


12/5/2018-due to likely irritation or increased size of his prostate the 

patient now has an indwelling Scott catheter.  His urologist stated that this 

was inevitable.  Follow-up with urology after discharge.


12/6/2018-continue Scott catheter.  Urology will need to assess for suprapubic 

catheter.








(3) Cystitis


Is this a current diagnosis for this admission?: Yes   


Plan: 


11/17- Was on suppresive Abx daily. Resumed bactrim ds BID. Urine culture with 

gram neg bacilli. Final ID and Sensitivities pending.


11/18/2018-discontinue Bactrim.  Continue Rocephin as ordered.  The patient 

will likely need a different suppressive antibiotic.


11/19/2018-culture positive for Klebsiella.  Resistant to Bactrim.  Currently 

on ceftriaxone.


12/1/2018-antibiotic therapy completed.  Cystitis resolved.











(4) Coronary artery disease with hx of myocardial infarct w/o hx of CABG


Is this a current diagnosis for this admission?: Yes   


Plan: 


Currently stable.  No cardiac symptoms.  Continue medications as ordered.


11/17- No change in medications


11/18/2018-no cardiac symptoms.  Continue current regimen.


11/19/2018-doing well.  Asymptomatic.  Continue current treatment plan.


12/1/2018-patient has been stable with no acute coronary symptoms.  Continue 

current treatment plan.


December 2, 2018-continue current regimen


12/3/2018-as above


12/4/2018-no changes


12/05/2018-continue current regimen


12/06/2018-no new symptoms.  Continue current regimen.








(5) COPD (chronic obstructive pulmonary disease)


Qualifiers: 


   COPD type: unspecified COPD   Qualified Code(s): J44.9 - Chronic obstructive 

pulmonary disease, unspecified   


Is this a current diagnosis for this admission?: Yes   


Plan: 


The patient has history of chronic obstructive pulmonary disease but is 

currently asymptomatic.  I will continue his home regimen.  Oxygen will be 

available for any hypoxia.  DuoNeb nebulizer therapy will be available if 

needed.





11/16/2018-no dyspnea or wheezing.  Continue current regimen as ordered.





11/17- breathing comfortably. No change in treatment plan.


11/18/2018-patient is stable.  Continue current regimen.


11/19/2018-breathing is comfortable.  There is no respiratory distress.  

Continue current


12/1/2018-no evidence of respiratory distress.  Continue regimen as ordered.


December 2, 2018-no changes in his current treatment plan


12/3/2018-patient stable.  No changes.


12/4/2018-continue current regimen


12/5/2018-the patient has been comfortable and has not exhibited difficulty 

breathing.  Continue current regimen.


12/6/2018-respiratory status unchanged.  Continue current regimen








(6) Diabetes mellitus, insulin dependent (IDDM), uncontrolled


Qualifiers: 


   Glycemic state: with hyperglycemia   Qualified Code(s): E10.65 - Type 1 

diabetes mellitus with hyperglycemia   


Is this a current diagnosis for this admission?: Yes   


Plan: 


I will continue the patient's current insulin regimen.  For tonight he will 

receive a smaller dose of his long-acting insulin because he will be n.p.o. for 

most of the day.  He will be on a Humalog sliding scale as well.  A hemoglobin 

A1c is pending for further clarification of the patient's level of control.





His glucose readings are still high.  His hemoglobin A1c was 10.6.  I have 

increased his Levemir to 35 units daily.


11/17- Increased lantus to 40 units daily. continue sliding scale.





11/18/2018-still with some glucose instability.  We will monitor the pattern 

for another day and probably make adjustments in his Lantus.  He may need twice 

daily dosing.


11/19/2018-glucoses are slightly better.  He does tend to increase as the day 

goes on.  His glucose this morning was in fact normal.  May need to add a low-

dose of Lantus in the morning.  We will continue on his current dose for today.


12/1/2018-the patient has been on sliding scale only for several days.  He 

still exhibits glucose readings over 200.  Low-dose Lantus trial will again be 

instituted.


December 2, 2018-as noted above a small dose of Lantus was added to the patient'

s regimen.  Subsequent most of his blood sugars have been less than 200.  

Continue to monitor and continue sliding scale.


12/3/2018-continue current regimen


12/4/2018-last 5 or 6 glucose checks between 100 -150.  No changes at this time.


12/5/2018-the patient has usually exhibited good glucose control.  This morning 

he had an elevated glucose reading but this could be spurious.


12/6/2018-his dinnertime glucose yesterday was only 85.  This morning he has 

been greater than 150 for breakfast and lunch.  Monitor glucoses.  He may need 

a change in his regimen if his sugar gets too low.  It seems difficult to get 

consistent control with him for the whole day.  Continue to monitor Accu-Cheks 

and adjust medications accordingly.








(7) Hypothyroidism


Qualifiers: 


   Hypothyroidism type: unspecified   Qualified Code(s): E03.9 - Hypothyroidism

, unspecified   


Is this a current diagnosis for this admission?: Yes   


Plan: 


We will continue his current dose of levothyroxine.  He takes 25 mcg daily.  No 

clinical indication for change.











(8) Bipolar 1 disorder


Is this a current diagnosis for this admission?: Yes   


Plan: 


Patient is on multiple medications.  We will continue his current regimen.  

Currently he appears asymptomatic.  I will obtain more detailed information on 

the history of his mental illness.





11/16/2018-the patient is slightly agitated but I believe this is from his 

pain.  Continue current medications as ordered.


11/17/18- Calm today. does respond to pain appropriately. In good spirits.


11/18/2018-seems uncomfortable today.  This is likely due to constipation.  No 

change in current regimen.


12/1/2018-patient is asymptomatic today.  Continue current regimen.


December 2, 2018-as noted above the patient did have acute agitation 

approximately 2 weeks ago.  This was in the initial postoperative window and 

lasted several days.  He is much better now.  Continue current regimen.


12/5/2018-most likely suffered acute postoperative symptoms but he has been 

extremely pleasant and back at baseline for over 1 week.  Continue current 

medications.


12/6/2018-very comfortable.  No anxiety or agitation.  He is frustrated because 

of the current living situation.  He was in a hotel and evidently they are 

trying to move to an apartment as they were displaced from their primary home.








(9) Constipation


Qualifiers: 


   Constipation type: drug induced constipation   Qualified Code(s): K59.03 - 

Drug induced constipation   


Is this a current diagnosis for this admission?: Yes   


Plan: 


11/17- Likely due to pain meds. Will give Miralax now and make available PRN. 

Start colace 100 bid as well.


11/18/2018-still with no bowel movement.  Distended and tympanitic.  I will 

administer a bottle of magnesium citrate and a more aggressive bowel regimen.


11/19/2018-he did have a good response to aggressive regimen yesterday.  We 

will continue stool softeners to try and avoid opiate-induced constipation.


12/1/2018-continue current regimen of stool softeners.


December 2, 2018-no recurrence at this time.








(10) Hydrocephalus


Is this a current diagnosis for this admission?: Yes   


Plan: 


The patient has normal pressure hydrocephalus.  He also has spina bifida.  He 

is awaiting neurosurgical consultation for shunt placement and surgical 

intervention for his spinal cord.  Unfortunately the significant gait 

abnormality has caused multiple falls.  He has worked with physical therapy.  

He does have a walker but it was difficult to use in the hotel that they are 

presently staying in.





11/16/2018-currently stable.  Awaiting neurosurgical evaluation for shunt 

placement.


11/17-stable no change


11/18/2018-stable no change.  Continue current regimen.


11/19/2018-no change in treatment plan.


12/1/2018-no change in his clinical presentation.  Continue current treatment 

plan.


December 2, 2018-continue current plan.  Patient awaiting neurosurgical 

evaluation.


12/5/[id number] with neurosurgery as outpatient








(11) Spina bifida


Qualifiers: 


   Spinal region: unspecified   Presence of hydrocephalus: unspecified 

hydrocephalus presence   Qualified Code(s): Q05.9 - Spina bifida, unspecified   


Is this a current diagnosis for this admission?: Yes   


Plan: 


The patient has a history of spina bifida with a "tethered "spinal cord.  This 

has caused issues with his gait as noted above.  He also has a neurogenic 

bladder.  He straight catheterizes himself regularly.  Postoperatively we will 

leave a Scott catheter in place but look to remove it as soon as possible.





11/16/2018-currently stable.  Awaiting neurosurgical evaluation.


11/17-stable no change continue current regimen


11/18/2018-as above


11/19/2018-continue current plan.


12/1/2018-chronic and stable.  No changes in treatment plan.


Some second 2018-patient awaiting neurosurgical consultation.  Continue current 

regimen at this time.


12/5/[id number] with neurosurgery as outpatient


12/6/2018 as above








(12) Hyponatremia


Is this a current diagnosis for this admission?: Yes   


Plan: 


December 2, 2018-patient serum sodium was low at the time of admission.  He 

subsequently normalized and is low again today.  Before making any changes I 

will recheck his serum sodium tomorrow.


12/5/2018-this is been an ongoing issue.  His sodium does fluctuate.  He does 

appear to respond to fluid restriction.  I will reinstitute fluid restriction.  

I am not sure of the exact etiology or natremia.  He was hyponatremic at the 

time of admission.


12/6/2018-his serum sodium is drifting down again.  We may need to resume his 

fluid restriction.








- Time


Time Spent with patient: 15-24 minutes


Medications reviewed and adjusted accordingly: Yes

## 2018-12-08 LAB
ANION GAP SERPL CALC-SCNC: 16 MMOL/L (ref 5–19)
BUN SERPL-MCNC: 9 MG/DL (ref 7–20)
CALCIUM: 9.3 MG/DL (ref 8.4–10.2)
CHLORIDE SERPL-SCNC: 87 MMOL/L (ref 98–107)
CO2 SERPL-SCNC: 22 MMOL/L (ref 22–30)
ERYTHROCYTE [DISTWIDTH] IN BLOOD BY AUTOMATED COUNT: 15.4 % (ref 11.5–14)
GLUCOSE SERPL-MCNC: 106 MG/DL (ref 75–110)
HCT VFR BLD CALC: 35.4 % (ref 37.9–51)
HGB BLD-MCNC: 12.2 G/DL (ref 13.5–17)
MCH RBC QN AUTO: 28.5 PG (ref 27–33.4)
MCHC RBC AUTO-ENTMCNC: 34.5 G/DL (ref 32–36)
MCV RBC AUTO: 83 FL (ref 80–97)
PLATELET # BLD: 385 10^3/UL (ref 150–450)
POTASSIUM SERPL-SCNC: 4.4 MMOL/L (ref 3.6–5)
RBC # BLD AUTO: 4.29 10^6/UL (ref 4.35–5.55)
SODIUM SERPL-SCNC: 124.5 MMOL/L (ref 137–145)
WBC # BLD AUTO: 9 10^3/UL (ref 4–10.5)

## 2018-12-08 RX ADMIN — LEVOTHYROXINE SODIUM SCH MG: 25 TABLET ORAL at 05:11

## 2018-12-08 RX ADMIN — DIVALPROEX SODIUM SCH MG: 250 TABLET, FILM COATED, EXTENDED RELEASE ORAL at 10:19

## 2018-12-08 RX ADMIN — METOPROLOL SUCCINATE SCH MG: 25 TABLET, EXTENDED RELEASE ORAL at 10:20

## 2018-12-08 RX ADMIN — SIMVASTATIN SCH MG: 40 TABLET, FILM COATED ORAL at 21:51

## 2018-12-08 RX ADMIN — BUSPIRONE HYDROCHLORIDE SCH MG: 10 TABLET ORAL at 10:19

## 2018-12-08 RX ADMIN — OXCARBAZEPINE SCH MG: 150 TABLET, FILM COATED ORAL at 21:51

## 2018-12-08 RX ADMIN — INSULIN GLARGINE SCH UNITS: 100 INJECTION, SOLUTION SUBCUTANEOUS at 21:53

## 2018-12-08 RX ADMIN — ASPRIN AND EXTENDED-RELEASE DIPYRIDAMOLE SCH CAP.SR: 25; 200 CAPSULE ORAL at 10:19

## 2018-12-08 RX ADMIN — DOCUSATE SODIUM SCH MG: 100 CAPSULE, LIQUID FILLED ORAL at 10:19

## 2018-12-08 RX ADMIN — ASPRIN AND EXTENDED-RELEASE DIPYRIDAMOLE SCH CAP.SR: 25; 200 CAPSULE ORAL at 17:41

## 2018-12-08 RX ADMIN — BENZTROPINE MESYLATE SCH MG: 1 TABLET ORAL at 17:41

## 2018-12-08 RX ADMIN — LISINOPRIL SCH MG: 5 TABLET ORAL at 10:20

## 2018-12-08 RX ADMIN — OXCARBAZEPINE SCH MG: 150 TABLET, FILM COATED ORAL at 10:19

## 2018-12-08 RX ADMIN — BUSPIRONE HYDROCHLORIDE SCH MG: 10 TABLET ORAL at 21:51

## 2018-12-08 RX ADMIN — OXYCODONE HYDROCHLORIDE PRN MG: 5 TABLET ORAL at 22:40

## 2018-12-08 RX ADMIN — LANSOPRAZOLE SCH MG: 30 TABLET, ORALLY DISINTEGRATING, DELAYED RELEASE ORAL at 10:20

## 2018-12-08 RX ADMIN — POTASSIUM CHLORIDE SCH MEQ: 750 TABLET, FILM COATED, EXTENDED RELEASE ORAL at 10:19

## 2018-12-08 RX ADMIN — TIOTROPIUM BROMIDE SCH INHALER: 18 CAPSULE ORAL; RESPIRATORY (INHALATION) at 10:21

## 2018-12-08 RX ADMIN — OXYCODONE HYDROCHLORIDE PRN MG: 5 TABLET ORAL at 16:19

## 2018-12-08 RX ADMIN — CLOTRIMAZOLE SCH APPLIC: 10 CREAM TOPICAL at 10:29

## 2018-12-08 RX ADMIN — BENZTROPINE MESYLATE SCH MG: 1 TABLET ORAL at 10:19

## 2018-12-08 RX ADMIN — FLUOXETINE SCH MG: 20 CAPSULE ORAL at 10:19

## 2018-12-08 RX ADMIN — SODIUM CHLORIDE PRN MLS/HR: 9 INJECTION, SOLUTION INTRAVENOUS at 17:27

## 2018-12-08 RX ADMIN — DOCUSATE SODIUM SCH MG: 100 CAPSULE, LIQUID FILLED ORAL at 17:41

## 2018-12-08 RX ADMIN — SODIUM CHLORIDE PRN MLS/HR: 9 INJECTION, SOLUTION INTRAVENOUS at 22:21

## 2018-12-08 RX ADMIN — CLOTRIMAZOLE SCH APPLIC: 10 CREAM TOPICAL at 17:43

## 2018-12-08 NOTE — PDOC PROGRESS REPORT
Subjective


Progress Note for:: 12/08/18


Subjective:: 





Patient lying in bed.  Complains of pain in his right leg.  That has not 

significantly improved.  Denies fever chills or sweats.


Reason For Visit: 


FX OF RIGHT LEG








Physical Exam


Vital Signs: 


 











Temp Pulse Resp BP Pulse Ox


 


 98.3 F   138 H  20   107/73   100 


 


 12/08/18 15:14  12/08/18 15:14  12/08/18 15:14  12/08/18 15:14  12/08/18 15:14








 Intake & Output











 12/07/18 12/08/18 12/09/18





 06:59 06:59 06:59


 


Intake Total 1491 1079 472


 


Output Total 1530 1350 500


 


Balance -39 -271 -28


 


Weight 60.7 kg 60.9 kg 











Musculoskeletal exam: PRESENT: other - Right lower extremity: Proximal 

incisions healed no erythema or drainage.  Distal incision small amount of 

oozing on the anterior aspect with serosanguineous drainage no erythema.  Mild 

swelling.  Palpable firm area along the anterior aspect of the tibia with 

tenderness to palpation.  Compartments soft and compressible no sign of 

compartment syndrome





Results


Laboratory Results: 


 





 12/08/18 08:17 





 12/08/18 08:17 





 











  12/08/18 12/08/18 12/08/18





  08:17 08:17 08:17


 


WBC  9.0  


 


RBC  4.29 L  


 


Hgb  12.2 L  


 


Hct  35.4 L  


 


MCV  83  


 


MCH  28.5  


 


MCHC  34.5  


 


RDW  15.4 H  


 


Plt Count  385  


 


Sodium    124.5 L


 


Potassium    4.4


 


Chloride    87 L


 


Carbon Dioxide    22


 


Anion Gap    16


 


BUN    9


 


Creatinine    0.73


 


Est GFR ( Amer)    > 60


 


Est GFR (Non-Af Amer)    > 60


 


Glucose    106


 


Calcium    9.3


 


Magnesium   1.6 











Impressions: 


 





Chest X-Ray  11/15/18 00:00


IMPRESSION:  No acute findings


 








Fluoroscopy  11/15/18 00:00


IMPRESSION:  ORIF tibial fracture.  Refer to operative note for further 

information.


 








Pelvis X-Ray  11/15/18 00:00


IMPRESSION:


 


Postsurgical changes with heterotopic bone formation of the


proximal femurs bilaterally. No radiographic evidence for acute


fracture. Correlate with any history of inability to ambulate.


 


 


 2011 [employer]- All Rights Reserved


 








Tibia/Fibula X-Ray  11/15/18 00:00


IMPRESSION:  ORIF tibial fracture.  Refer to operative note for further 

information.


 








Ankle X-Ray  11/15/18 06:35


IMPRESSION:


 


Displaced oblique fractures of the distal fibular and tibial


diaphyses as described above.


 








Head CT  11/15/18 06:35


IMPRESSION:


 


1. There is no evidence of acute intracranial pathology.


2. Stable enlarged ventricles out of proportion to the sulci


which can be seen with normal pressure hydrocephalus.


3. 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


infarcts.


 














Assessment & Plan





- Diagnosis


(1) Displaced fracture of tibia


Qualifiers: 


   Encounter type: subsequent encounter   Tibia location: distal physis (incl. 

Salter-Pre[place])   Laterality: right 


Is this a current diagnosis for this admission?: Yes   


Plan: 


Status post IM nail right tibia





1.  Pain control 


2.  Aspirin for DVT prophylaxis


3.  Physical therapy touchdown weightbearing


4.  Discharge to skilled nursing facility when bed available


We will obtain a repeat radiograph since patient continues to have discomfort.

## 2018-12-08 NOTE — RADIOLOGY REPORT (SQ)
EXAM DESCRIPTION:  TIBIA FIBULA RIGHT



COMPLETED DATE/TIME:  12/8/2018 6:55 pm



REASON FOR STUDY:  post op



COMPARISON:  Operative study 11/15/2018



NUMBER OF VIEWS:  Two views.



TECHNIQUE:  Two radiographic images acquired of the right tibia and fibula to include the knee and an
kle in at least one projection.



LIMITATIONS:  None.



FINDINGS:  MINERALIZATION: Normal.

BONES: IM davi in the tibia.  Traversing PN Bina.  Badly comminuted distal tibia and fibular fractures
.  There appears to be some early callus formation.  Persistent anterior displacement of the proximal
 end of the tibial fracture.

SOFT TISSUES: No obvious swelling or foreign body.

OTHER: No other significant finding.



IMPRESSION:  Healing fractures of the distal tibia and fibular with internal fixation of the tibia.



TECHNICAL DOCUMENTATION:  JOB ID:  [id number]

 2011 [employer]- All Rights Reserved



Reading location - IP/workstation name: [email or web address]

## 2018-12-08 NOTE — PDOC PROGRESS REPORT
Subjective


Progress Note for:: 12/08/18 - seen on rounds this morning


Subjective:: 





I went to see patient when he had just had a bowel movement and was being 

cleaned up.  He was in a lot of pain because he was being turned to the sides 

and he gets a lot of pain with movement of his lower extremity.  He is upset 

and he wants to go home but unfortunately he does not have a place to go to.  

He is aware that case management is working on disposition


Reason For Visit: 


FX OF RIGHT LEG








Physical Exam


Vital Signs: 


 











Temp Pulse Resp BP Pulse Ox


 


 98.3 F   138 H  20   107/73   100 


 


 12/08/18 15:14  12/08/18 15:14  12/08/18 15:14  12/08/18 15:14  12/08/18 15:14








 Intake & Output











 12/07/18 12/08/18 12/09/18





 06:59 06:59 06:59


 


Intake Total 1491 1079 


 


Output Total 1530 1350 


 


Balance -39 -271 


 


Weight 133 lb 13.129 oz 134 lb 4.184 oz 











General appearance: PRESENT: no acute distress


Head exam: PRESENT: atraumatic, normocephalic


Eye exam: PRESENT: EOMI.  ABSENT: conjunctival injection, scleral icterus


Ear exam: PRESENT: normal external ear exam


Mouth exam: PRESENT: tongue midline


Neck exam: ABSENT: tracheal deviation


Respiratory exam: PRESENT: clear to auscultation david, symmetrical


Cardiovascular exam: PRESENT: +S1, +S2


Pulses: PRESENT: +1 pedal pulses bilateral


GI/Abdominal exam: PRESENT: normal bowel sounds, soft.  ABSENT: tenderness


Extremities exam: PRESENT: tenderness - Lower extremity tender to palpation 

mostly of the right shin


Neurological exam: PRESENT: alert, awake, oriented to person, oriented to place

, oriented to time, oriented to situation, CN II-XII grossly intact


Skin exam: PRESENT: dry, warm





Results


Laboratory Results: 


 





 12/08/18 08:17 





 12/08/18 08:17 





 











  12/08/18 12/08/18 12/08/18





  08:17 08:17 08:17


 


WBC  9.0  


 


RBC  4.29 L  


 


Hgb  12.2 L  


 


Hct  35.4 L  


 


MCV  83  


 


MCH  28.5  


 


MCHC  34.5  


 


RDW  15.4 H  


 


Plt Count  385  


 


Sodium    124.5 L


 


Potassium    4.4


 


Chloride    87 L


 


Carbon Dioxide    22


 


Anion Gap    16


 


BUN    9


 


Creatinine    0.73


 


Est GFR ( Amer)    > 60


 


Est GFR (Non-Af Amer)    > 60


 


Glucose    106


 


Calcium    9.3


 


Magnesium   1.6 











Impressions: 


 





Chest X-Ray  11/15/18 00:00


IMPRESSION:  No acute findings


 








Fluoroscopy  11/15/18 00:00


IMPRESSION:  ORIF tibial fracture.  Refer to operative note for further 

information.


 








Pelvis X-Ray  11/15/18 00:00


IMPRESSION:


 


Postsurgical changes with heterotopic bone formation of the


proximal femurs bilaterally. No radiographic evidence for acute


fracture. Correlate with any history of inability to ambulate.


 


 


 2011 [employer]- All Rights Reserved


 








Tibia/Fibula X-Ray  11/15/18 00:00


IMPRESSION:  ORIF tibial fracture.  Refer to operative note for further 

information.


 








Ankle X-Ray  11/15/18 06:35


IMPRESSION:


 


Displaced oblique fractures of the distal fibular and tibial


diaphyses as described above.


 








Head CT  11/15/18 06:35


IMPRESSION:


 


1. There is no evidence of acute intracranial pathology.


2. Stable enlarged ventricles out of proportion to the sulci


which can be seen with normal pressure hydrocephalus.


3. 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


infarcts.


 














Assessment & Plan





- Diagnosis


(1) Coronary artery disease with hx of myocardial infarct w/o hx of CABG


Is this a current diagnosis for this admission?: Yes   





(2) Diabetes mellitus, insulin dependent (IDDM), uncontrolled


Qualifiers: 


   Glycemic state: with hyperglycemia   Qualified Code(s): E10.65 - Type 1 

diabetes mellitus with hyperglycemia   


Is this a current diagnosis for this admission?: Yes   





(3) Fracture of distal end of tibia


Qualifiers: 


   Encounter type: initial encounter   Fracture type: closed   Fracture 

morphology: other fracture   Laterality: right   Qualified Code(s): S82.391A - 

Other fracture of lower end of right tibia, initial encounter for closed 

fracture   


Is this a current diagnosis for this admission?: Yes   





(4) Fracture of distal fibula


Qualifiers: 


   Fracture type: closed   Fracture morphology: unspecified fracture morphology

   Laterality: right 


Is this a current diagnosis for this admission?: Yes   





(5) Neurogenic bladder


Is this a current diagnosis for this admission?: Yes   





(6) COPD (chronic obstructive pulmonary disease)


Qualifiers: 


   COPD type: unspecified COPD   Qualified Code(s): J44.9 - Chronic obstructive 

pulmonary disease, unspecified   


Is this a current diagnosis for this admission?: Yes   





(7) Chronic kidney disease, stage 2 (mild)


Is this a current diagnosis for this admission?: Yes   





(8) Hypertension


Qualifiers: 


   Hypertension type: essential hypertension   Qualified Code(s): I10 - 

Essential (primary) hypertension   


Is this a current diagnosis for this admission?: Yes   





(9) Hypothyroidism


Qualifiers: 


   Hypothyroidism type: unspecified   Qualified Code(s): E03.9 - Hypothyroidism

, unspecified   


Is this a current diagnosis for this admission?: Yes   





(10) Urinary tract infection


Qualifiers: 


   Urinary tract infection type: site unspecified   Hematuria presence: without 

hematuria   Qualified Code(s): N39.0 - Urinary tract infection, site not 

specified   


Is this a current diagnosis for this admission?: Yes   





(11) Hyponatremia


Is this a current diagnosis for this admission?: Yes   





- Plan Summary


Plan Summary: 





Fracture of the right tibia/fibula- s/p right Le tib/fib fracture repair. he 

has been going through with PT.   he has been sitting in the hospital for 3 

weeks now waiting for placement. CM is aware. given his recent surgery- he will 

need less than 30 days of rehab at this time.  Continue with pain control as 

needed


Hyponatremia-sodium noted to be in 124 for the last 2 days.  He does not look 

to be volume overloaded and so I will try him on IV fluids-gentle hydration of 

normal saline at 100 cc/h for the next 15 hours.  We will repeat his BMP in the 

morning


cystitis- completed course. 


COPD-continue with Spiriva and DuoNeb


Diabetes mellitus type 2-continue with Lantus and sliding scale insulin


GERD-continue with acid suppression


Hypothyroidism-continue with Synthroid


Constipation-continue with bowel regimen





Today noticed that he still has his Scott since his surgery in November.  I 

told patient that I need to discontinue it and he became very upset with me.  I 

explained to him the risk of keeping a Scott in and developing a catheter 

related infection.  States that he is having care he wants the Scott.  I told 

him that unfortunately the 40 next to come out and he needs to start voiding on 

his own again.  He tells me that at home he self caths every day and that the 

Scott if necessary.  Since he told me this that he self caths every day then I 

will leave the Scott in for him at this time

## 2018-12-09 LAB
ANION GAP SERPL CALC-SCNC: 16 MMOL/L (ref 5–19)
BUN SERPL-MCNC: 7 MG/DL (ref 7–20)
CALCIUM: 9.3 MG/DL (ref 8.4–10.2)
CHLORIDE SERPL-SCNC: 88 MMOL/L (ref 98–107)
CO2 SERPL-SCNC: 22 MMOL/L (ref 22–30)
GLUCOSE SERPL-MCNC: 153 MG/DL (ref 75–110)
OSMOLALITY,URINE: 266 MOSM/KG (ref 300–900)
POTASSIUM SERPL-SCNC: 4.8 MMOL/L (ref 3.6–5)
SODIUM SERPL-SCNC: 125.6 MMOL/L (ref 137–145)
URINE SODIUM: 55 MMOL/L (ref 30–90)

## 2018-12-09 RX ADMIN — BUSPIRONE HYDROCHLORIDE SCH MG: 10 TABLET ORAL at 21:38

## 2018-12-09 RX ADMIN — LANSOPRAZOLE SCH MG: 30 TABLET, ORALLY DISINTEGRATING, DELAYED RELEASE ORAL at 10:59

## 2018-12-09 RX ADMIN — SIMVASTATIN SCH MG: 40 TABLET, FILM COATED ORAL at 21:38

## 2018-12-09 RX ADMIN — BUSPIRONE HYDROCHLORIDE SCH MG: 10 TABLET ORAL at 10:59

## 2018-12-09 RX ADMIN — INSULIN GLARGINE SCH UNITS: 100 INJECTION, SOLUTION SUBCUTANEOUS at 21:41

## 2018-12-09 RX ADMIN — CYCLOBENZAPRINE HYDROCHLORIDE PRN MG: 10 TABLET, FILM COATED ORAL at 10:59

## 2018-12-09 RX ADMIN — POTASSIUM CHLORIDE SCH MEQ: 750 TABLET, FILM COATED, EXTENDED RELEASE ORAL at 11:00

## 2018-12-09 RX ADMIN — CLOTRIMAZOLE SCH: 10 CREAM TOPICAL at 11:03

## 2018-12-09 RX ADMIN — BENZTROPINE MESYLATE SCH MG: 1 TABLET ORAL at 11:00

## 2018-12-09 RX ADMIN — ASPRIN AND EXTENDED-RELEASE DIPYRIDAMOLE SCH CAP.SR: 25; 200 CAPSULE ORAL at 17:44

## 2018-12-09 RX ADMIN — OXCARBAZEPINE SCH MG: 150 TABLET, FILM COATED ORAL at 21:38

## 2018-12-09 RX ADMIN — DOCUSATE SODIUM SCH: 100 CAPSULE, LIQUID FILLED ORAL at 17:40

## 2018-12-09 RX ADMIN — DOCUSATE SODIUM SCH: 100 CAPSULE, LIQUID FILLED ORAL at 11:03

## 2018-12-09 RX ADMIN — OXYCODONE HYDROCHLORIDE PRN MG: 5 TABLET ORAL at 11:50

## 2018-12-09 RX ADMIN — INSULIN LISPRO PRN UNIT: 100 INJECTION, SOLUTION INTRAVENOUS; SUBCUTANEOUS at 13:37

## 2018-12-09 RX ADMIN — DIVALPROEX SODIUM SCH MG: 250 TABLET, FILM COATED, EXTENDED RELEASE ORAL at 11:00

## 2018-12-09 RX ADMIN — LISINOPRIL SCH MG: 5 TABLET ORAL at 10:59

## 2018-12-09 RX ADMIN — BENZTROPINE MESYLATE SCH MG: 1 TABLET ORAL at 17:44

## 2018-12-09 RX ADMIN — ASPRIN AND EXTENDED-RELEASE DIPYRIDAMOLE SCH CAP.SR: 25; 200 CAPSULE ORAL at 10:59

## 2018-12-09 RX ADMIN — TIOTROPIUM BROMIDE SCH: 18 CAPSULE ORAL; RESPIRATORY (INHALATION) at 11:03

## 2018-12-09 RX ADMIN — METOPROLOL SUCCINATE SCH MG: 25 TABLET, EXTENDED RELEASE ORAL at 10:59

## 2018-12-09 RX ADMIN — OXCARBAZEPINE SCH MG: 150 TABLET, FILM COATED ORAL at 10:59

## 2018-12-09 RX ADMIN — FLUOXETINE SCH MG: 20 CAPSULE ORAL at 10:59

## 2018-12-09 RX ADMIN — OXYCODONE HYDROCHLORIDE PRN MG: 5 TABLET ORAL at 21:38

## 2018-12-09 RX ADMIN — LEVOTHYROXINE SODIUM SCH MG: 25 TABLET ORAL at 06:21

## 2018-12-09 NOTE — PDOC PROGRESS REPORT
Subjective


Progress Note for:: 12/09/18


Subjective:: 





Patient lying in bed.  Complains of pain in his right leg.  That has not 

significantly improved.  Denies fever chills or sweats.  Patient denies change 

in his past 24 hours.  After further questioning also discussed possibility 

that he sustained a fall to lower extremity which he has denied.


Reason For Visit: 


FX OF RIGHT LEG








Physical Exam


Vital Signs: 


 











Temp Pulse Resp BP Pulse Ox


 


 98.3 F   80   16   129/66 H  100 


 


 12/09/18 07:37  12/09/18 07:37  12/09/18 07:37  12/09/18 07:37  12/09/18 07:37








 Intake & Output











 12/08/18 12/09/18 12/10/18





 06:59 06:59 06:59


 


Intake Total 1079 1922 1000


 


Output Total 1350 1100 


 


Balance -


 


Weight 60.9 kg 61.2 kg 











Musculoskeletal exam: PRESENT: other - Right lower extremity: Proximal 

incisions healed no erythema or drainage.  Mild serosanguineous drainage along 

the distal aspect.  Bony protuberance anteriorly without evidence of skin 

compromise.  Intact flexion extension of the toes.





Results


Laboratory Results: 


 





 12/08/18 08:17 





 12/08/18 08:17 








Impressions: 


 





Chest X-Ray  11/15/18 00:00


IMPRESSION:  No acute findings


 








Fluoroscopy  11/15/18 00:00


IMPRESSION:  ORIF tibial fracture.  Refer to operative note for further 

information.


 








Pelvis X-Ray  11/15/18 00:00


IMPRESSION:


 


Postsurgical changes with heterotopic bone formation of the


proximal femurs bilaterally. No radiographic evidence for acute


fracture. Correlate with any history of inability to ambulate.


 


 


 2011 [employer]- All Rights Reserved


 








Ankle X-Ray  11/15/18 06:35


IMPRESSION:


 


Displaced oblique fractures of the distal fibular and tibial


diaphyses as described above.


 








Head CT  11/15/18 06:35


IMPRESSION:


 


1. There is no evidence of acute intracranial pathology.


2. Stable enlarged ventricles out of proportion to the sulci


which can be seen with normal pressure hydrocephalus.


3. 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


infarcts.


 








Tibia/Fibula X-Ray  12/08/18 00:00


IMPRESSION:  Healing fractures of the distal tibia and fibular with internal 

fixation of the tibia.


 














Assessment & Plan





- Diagnosis


(1) Displaced fracture of tibia


Qualifiers: 


   Encounter type: subsequent encounter   Tibia location: distal physis (incl. 

Salter-Prieto)   Laterality: right 


Is this a current diagnosis for this admission?: Yes   


Plan: 


I have reviewed patient's radiographs from yesterday which demonstrate evidence 

of healing however there increased dorsal displacement compared to 

intraoperative radiographs.  No evidence of intra-articular screw penetration 

or loosening.  We will discuss findings of radiographs with Dr. Sanchez 

determine appropriate treatment plan.

## 2018-12-09 NOTE — PDOC PROGRESS REPORT
Subjective


Progress Note for:: 12/09/18 - Seen on rounds this morning


Subjective:: 





Came to see patient but he was very sleepy.  Spoke to patient and he says he 

feels fine.  He tells me that his leg still hurts.  He had no other acute 

complaints to me-denies chest pain, shortness of breath, abdominal pain, nausea 

or vomiting.  I told him to try asking the nurse for his as needed pain meds 

and is agreeable.


Reason For Visit: 


FX OF RIGHT LEG








Physical Exam


Vital Signs: 


 











Temp Pulse Resp BP Pulse Ox


 


 98.2 F   82   16   109/61   100 


 


 12/09/18 11:39  12/09/18 11:39  12/09/18 11:39  12/09/18 11:39  12/09/18 11:39








 Intake & Output











 12/08/18 12/09/18 12/10/18





 06:59 06:59 06:59


 


Intake Total 1079 1922 1000


 


Output Total 1350 1100 


 


Balance -


 


Weight 134 lb 4.184 oz 134 lb 14.766 oz 











General appearance: PRESENT: no acute distress


Head exam: PRESENT: atraumatic, normocephalic


Eye exam: PRESENT: EOMI.  ABSENT: conjunctival injection, scleral icterus


Ear exam: PRESENT: normal external ear exam


Mouth exam: PRESENT: tongue midline


Neck exam: ABSENT: tracheal deviation


Respiratory exam: PRESENT: clear to auscultation david, symmetrical


Cardiovascular exam: PRESENT: +S1, +S2


Pulses: PRESENT: +1 pedal pulses bilateral


GI/Abdominal exam: PRESENT: normal bowel sounds, soft.  ABSENT: tenderness


Extremities exam: ABSENT: pedal edema


Musculoskeletal exam: PRESENT: tenderness - Right lower extremity-tender to 

palpation but as he was resting he did not want me to examine him completely.


Neurological exam: PRESENT: alert, awake, oriented to person, oriented to place

, oriented to time, oriented to situation, CN II-XII grossly intact


Skin exam: PRESENT: dry, warm





Results


Laboratory Results: 


 





 12/08/18 08:17 





 12/08/18 08:17 








Impressions: 


 





Chest X-Ray  11/15/18 00:00


IMPRESSION:  No acute findings


 








Fluoroscopy  11/15/18 00:00


IMPRESSION:  ORIF tibial fracture.  Refer to operative note for further 

information.


 








Pelvis X-Ray  11/15/18 00:00


IMPRESSION:


 


Postsurgical changes with heterotopic bone formation of the


proximal femurs bilaterally. No radiographic evidence for acute


fracture. Correlate with any history of inability to ambulate.


 


 


 2011 [employer]- All Rights Reserved


 








Ankle X-Ray  11/15/18 06:35


IMPRESSION:


 


Displaced oblique fractures of the distal fibular and tibial


diaphyses as described above.


 








Head CT  11/15/18 06:35


IMPRESSION:


 


1. There is no evidence of acute intracranial pathology.


2. Stable enlarged ventricles out of proportion to the sulci


which can be seen with normal pressure hydrocephalus.


3. 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


infarcts.


 








Tibia/Fibula X-Ray  12/08/18 00:00


IMPRESSION:  Healing fractures of the distal tibia and fibular with internal 

fixation of the tibia.


 














Assessment & Plan





- Diagnosis


(1) Coronary artery disease with hx of myocardial infarct w/o hx of CABG


Is this a current diagnosis for this admission?: Yes   





(2) Diabetes mellitus, insulin dependent (IDDM), uncontrolled


Qualifiers: 


   Glycemic state: with hyperglycemia   Qualified Code(s): E10.65 - Type 1 

diabetes mellitus with hyperglycemia   


Is this a current diagnosis for this admission?: Yes   





(3) Fracture of distal end of tibia


Qualifiers: 


   Encounter type: initial encounter   Fracture type: closed   Fracture 

morphology: other fracture   Laterality: right   Qualified Code(s): S82.391A - 

Other fracture of lower end of right tibia, initial encounter for closed 

fracture   


Is this a current diagnosis for this admission?: Yes   





(4) Fracture of distal fibula


Qualifiers: 


   Fracture type: closed   Fracture morphology: unspecified fracture morphology

   Laterality: right 


Is this a current diagnosis for this admission?: Yes   





(5) Neurogenic bladder


Is this a current diagnosis for this admission?: Yes   





(6) COPD (chronic obstructive pulmonary disease)


Qualifiers: 


   COPD type: unspecified COPD   Qualified Code(s): J44.9 - Chronic obstructive 

pulmonary disease, unspecified   


Is this a current diagnosis for this admission?: Yes   





(7) Chronic kidney disease, stage 2 (mild)


Is this a current diagnosis for this admission?: Yes   





(8) Hypertension


Qualifiers: 


   Hypertension type: essential hypertension   Qualified Code(s): I10 - 

Essential (primary) hypertension   


Is this a current diagnosis for this admission?: Yes   





(9) Hypothyroidism


Qualifiers: 


   Hypothyroidism type: unspecified   Qualified Code(s): E03.9 - Hypothyroidism

, unspecified   


Is this a current diagnosis for this admission?: Yes   





(10) Urinary tract infection


Qualifiers: 


   Urinary tract infection type: site unspecified   Hematuria presence: without 

hematuria   Qualified Code(s): N39.0 - Urinary tract infection, site not 

specified   


Is this a current diagnosis for this admission?: Yes   





(11) Hyponatremia


Is this a current diagnosis for this admission?: Yes   





- Plan Summary


Plan Summary: 





Fracture of the right tibia/fibula-followed by orthopedics-I appreciate their 

assistance.  I see that orthopedics is doing x-rays of his right lower 

extremity again and is concerned about the healing.  Management per 

orthopedics.  S/p right Le tib/fib fracture repair. he has been going through 

with PT.   he has been sitting in the hospital for 3 weeks now waiting for 

placement. CM is aware. given his recent surgery- he will need less than 30 

days of rehab at this time.  Continue with pain control as needed.  He 

continues to complain of pain although he is on multiple pain medications and 

does not realize it.





Hyponatremia-sodium noted to be in 124 for the last 2 days.  Awaiting BMP this 

morning.  I will await starting IV fluids again.  If his sodium drops then may 

have SIADH status post fracture.  And we will have to fluid restrict him.





cystitis- completed course. 


COPD-continue with Spiriva and DuoNeb


Diabetes mellitus type 2-continue with Lantus and sliding scale insulin


GERD-continue with acid suppression


Hypothyroidism-continue with Synthroid


Constipation-continue with bowel regimen


Neurogenic bladder-continue with Scott.

## 2018-12-10 RX ADMIN — BUSPIRONE HYDROCHLORIDE SCH MG: 10 TABLET ORAL at 10:19

## 2018-12-10 RX ADMIN — ASPRIN AND EXTENDED-RELEASE DIPYRIDAMOLE SCH CAP.SR: 25; 200 CAPSULE ORAL at 10:24

## 2018-12-10 RX ADMIN — BENZTROPINE MESYLATE SCH MG: 1 TABLET ORAL at 10:17

## 2018-12-10 RX ADMIN — LEVOTHYROXINE SODIUM SCH MG: 25 TABLET ORAL at 10:16

## 2018-12-10 RX ADMIN — LANSOPRAZOLE SCH MG: 30 TABLET, ORALLY DISINTEGRATING, DELAYED RELEASE ORAL at 10:18

## 2018-12-10 RX ADMIN — TIOTROPIUM BROMIDE SCH INHALER: 18 CAPSULE ORAL; RESPIRATORY (INHALATION) at 10:46

## 2018-12-10 RX ADMIN — SODIUM CHLORIDE TAB 1 GM SCH GM: 1 TAB at 10:17

## 2018-12-10 RX ADMIN — POTASSIUM CHLORIDE SCH MEQ: 750 TABLET, FILM COATED, EXTENDED RELEASE ORAL at 10:19

## 2018-12-10 RX ADMIN — BUSPIRONE HYDROCHLORIDE SCH MG: 10 TABLET ORAL at 21:50

## 2018-12-10 RX ADMIN — FLUOXETINE SCH MG: 20 CAPSULE ORAL at 10:16

## 2018-12-10 RX ADMIN — BENZTROPINE MESYLATE SCH MG: 1 TABLET ORAL at 18:35

## 2018-12-10 RX ADMIN — DIVALPROEX SODIUM SCH MG: 250 TABLET, FILM COATED, EXTENDED RELEASE ORAL at 10:16

## 2018-12-10 RX ADMIN — DOCUSATE SODIUM SCH MG: 100 CAPSULE, LIQUID FILLED ORAL at 10:17

## 2018-12-10 RX ADMIN — METOPROLOL SUCCINATE SCH MG: 25 TABLET, EXTENDED RELEASE ORAL at 10:17

## 2018-12-10 RX ADMIN — DOCUSATE SODIUM SCH MG: 100 CAPSULE, LIQUID FILLED ORAL at 18:35

## 2018-12-10 RX ADMIN — LISINOPRIL SCH MG: 5 TABLET ORAL at 10:18

## 2018-12-10 RX ADMIN — INSULIN GLARGINE SCH: 100 INJECTION, SOLUTION SUBCUTANEOUS at 21:49

## 2018-12-10 RX ADMIN — OXYCODONE HYDROCHLORIDE PRN MG: 5 TABLET ORAL at 21:50

## 2018-12-10 RX ADMIN — SIMVASTATIN SCH MG: 40 TABLET, FILM COATED ORAL at 21:50

## 2018-12-10 RX ADMIN — OXCARBAZEPINE SCH MG: 150 TABLET, FILM COATED ORAL at 10:16

## 2018-12-10 RX ADMIN — INSULIN LISPRO PRN UNIT: 100 INJECTION, SOLUTION INTRAVENOUS; SUBCUTANEOUS at 14:26

## 2018-12-10 RX ADMIN — ASPRIN AND EXTENDED-RELEASE DIPYRIDAMOLE SCH CAP.SR: 25; 200 CAPSULE ORAL at 18:35

## 2018-12-10 RX ADMIN — OXCARBAZEPINE SCH MG: 150 TABLET, FILM COATED ORAL at 21:50

## 2018-12-10 NOTE — PDOC PROGRESS REPORT
Subjective


Progress Note for:: 12/10/18


Subjective:: 





No adverse events overnight.  No new complaints.  He says his energy level is 

low.  His appetite is not been very good the last couple of days.  Vital signs 

been stable.


Reason For Visit: 


FX OF RIGHT LEG








Physical Exam


Vital Signs: 


 











Temp Pulse Resp BP Pulse Ox


 


 98.8 F   88   18   117/76   100 


 


 12/10/18 11:30  12/10/18 11:30  12/10/18 11:30  12/10/18 11:30  12/10/18 11:30








 Intake & Output











 12/09/18 12/10/18 12/11/18





 06:59 06:59 06:59


 


Intake Total 1922 3575 620


 


Output Total 1100 2125 


 


Balance 822 1450 620


 


Weight 61.2 kg 60.7 kg 








General appearance: PRESENT: no acute distress


Respiratory exam: PRESENT: clear to auscultation david, symmetrical


Cardiovascular exam: PRESENT: +S1, +S2


Pulses: PRESENT: +1 pedal pulses bilateral


GI/Abdominal exam: PRESENT: normal bowel sounds, soft.  ABSENT: tenderness


Extremities exam: ABSENT: pedal edema


Musculoskeletal exam: PRESENT: tenderness - Right lower extremity-tender to 

palpation but it did not seem to bother him when he was distracted.


Neurological exam: PRESENT: alert, awake, oriented to person, oriented to place

, oriented to time


Skin exam: PRESENT: dry, warm








Results


Laboratory Results: 


 





 12/08/18 08:17 





 12/09/18 13:46 





 











  12/09/18 12/09/18





  13:46 17:33


 


Serum Osmolality  257 L 


 


Urine Osmolality   266 L











Impressions: 


 





Chest X-Ray  11/15/18 00:00


IMPRESSION:  No acute findings


 








Fluoroscopy  11/15/18 00:00


IMPRESSION:  ORIF tibial fracture.  Refer to operative note for further 

information.


 








Pelvis X-Ray  11/15/18 00:00


IMPRESSION:


 


Postsurgical changes with heterotopic bone formation of the


proximal femurs bilaterally. No radiographic evidence for acute


fracture. Correlate with any history of inability to ambulate.


 


 


 2011 [employer]- All Rights Reserved


 








Ankle X-Ray  11/15/18 06:35


IMPRESSION:


 


Displaced oblique fractures of the distal fibular and tibial


diaphyses as described above.


 








Head CT  11/15/18 06:35


IMPRESSION:


 


1. There is no evidence of acute intracranial pathology.


2. Stable enlarged ventricles out of proportion to the sulci


which can be seen with normal pressure hydrocephalus.


3. 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


infarcts.


 








Tibia/Fibula X-Ray  12/08/18 00:00


IMPRESSION:  Healing fractures of the distal tibia and fibular with internal 

fixation of the tibia.


 














Assessment & Plan





- Diagnosis


(1) Displaced fracture of tibia


Qualifiers: 


   Encounter type: subsequent encounter   Tibia location: distal physis (incl. 

Salter-Prieto)   Laterality: right 


Is this a current diagnosis for this admission?: Yes   


Plan: 


Status post operative repair, orthopedics following.  We are trying to get him 

into some sort of rehab setting.








(2) Bipolar 1 disorder


Is this a current diagnosis for this admission?: Yes   


Plan: 


Stable on his usual medications.








(3) Hyponatremia


Is this a current diagnosis for this admission?: Yes   


Plan: 


Will monitoring his fluid intake and he is getting some salt tablets now.  The 

decline in his sodium seems to have stabilized.  Etiology uncertain, it is 

noted that he is on multiple psychiatric medications.  We will monitor his 

response to salt tablets.  If he does not improve he will need further 

investigation.








- Time


Time Spent with patient: 25-34 minutes

## 2018-12-11 LAB
ANION GAP SERPL CALC-SCNC: 14 MMOL/L (ref 5–19)
BUN SERPL-MCNC: 6 MG/DL (ref 7–20)
CALCIUM: 9.8 MG/DL (ref 8.4–10.2)
CHLORIDE SERPL-SCNC: 91 MMOL/L (ref 98–107)
CO2 SERPL-SCNC: 25 MMOL/L (ref 22–30)
GLUCOSE SERPL-MCNC: 137 MG/DL (ref 75–110)
POTASSIUM SERPL-SCNC: 5.2 MMOL/L (ref 3.6–5)
SODIUM SERPL-SCNC: 129.6 MMOL/L (ref 137–145)

## 2018-12-11 RX ADMIN — BENZTROPINE MESYLATE SCH MG: 1 TABLET ORAL at 17:00

## 2018-12-11 RX ADMIN — METOPROLOL SUCCINATE SCH MG: 25 TABLET, EXTENDED RELEASE ORAL at 11:49

## 2018-12-11 RX ADMIN — DIVALPROEX SODIUM SCH MG: 250 TABLET, FILM COATED, EXTENDED RELEASE ORAL at 11:48

## 2018-12-11 RX ADMIN — BENZTROPINE MESYLATE SCH MG: 1 TABLET ORAL at 11:48

## 2018-12-11 RX ADMIN — BUSPIRONE HYDROCHLORIDE SCH MG: 10 TABLET ORAL at 21:10

## 2018-12-11 RX ADMIN — OXCARBAZEPINE SCH MG: 150 TABLET, FILM COATED ORAL at 21:11

## 2018-12-11 RX ADMIN — OXCARBAZEPINE SCH MG: 150 TABLET, FILM COATED ORAL at 11:50

## 2018-12-11 RX ADMIN — ASPRIN AND EXTENDED-RELEASE DIPYRIDAMOLE SCH CAP.SR: 25; 200 CAPSULE ORAL at 11:49

## 2018-12-11 RX ADMIN — OXYCODONE HYDROCHLORIDE PRN MG: 5 TABLET ORAL at 11:48

## 2018-12-11 RX ADMIN — DOCUSATE SODIUM SCH: 100 CAPSULE, LIQUID FILLED ORAL at 17:00

## 2018-12-11 RX ADMIN — SIMVASTATIN SCH MG: 40 TABLET, FILM COATED ORAL at 21:11

## 2018-12-11 RX ADMIN — INSULIN LISPRO PRN UNIT: 100 INJECTION, SOLUTION INTRAVENOUS; SUBCUTANEOUS at 17:00

## 2018-12-11 RX ADMIN — OXYCODONE HYDROCHLORIDE PRN MG: 5 TABLET ORAL at 19:55

## 2018-12-11 RX ADMIN — TIOTROPIUM BROMIDE SCH INHALER: 18 CAPSULE ORAL; RESPIRATORY (INHALATION) at 11:55

## 2018-12-11 RX ADMIN — FLUOXETINE SCH MG: 20 CAPSULE ORAL at 11:48

## 2018-12-11 RX ADMIN — DOCUSATE SODIUM SCH: 100 CAPSULE, LIQUID FILLED ORAL at 11:50

## 2018-12-11 RX ADMIN — LEVOTHYROXINE SODIUM SCH MG: 25 TABLET ORAL at 05:48

## 2018-12-11 RX ADMIN — INSULIN GLARGINE SCH UNITS: 100 INJECTION, SOLUTION SUBCUTANEOUS at 21:11

## 2018-12-11 RX ADMIN — ASPRIN AND EXTENDED-RELEASE DIPYRIDAMOLE SCH CAP.SR: 25; 200 CAPSULE ORAL at 17:00

## 2018-12-11 RX ADMIN — POTASSIUM CHLORIDE SCH: 750 TABLET, FILM COATED, EXTENDED RELEASE ORAL at 15:25

## 2018-12-11 RX ADMIN — SODIUM CHLORIDE TAB 1 GM SCH GM: 1 TAB at 11:48

## 2018-12-11 RX ADMIN — BUSPIRONE HYDROCHLORIDE SCH MG: 10 TABLET ORAL at 11:48

## 2018-12-11 RX ADMIN — LANSOPRAZOLE SCH MG: 30 TABLET, ORALLY DISINTEGRATING, DELAYED RELEASE ORAL at 11:50

## 2018-12-11 RX ADMIN — LISINOPRIL SCH MG: 5 TABLET ORAL at 11:48

## 2018-12-11 NOTE — PDOC PROGRESS REPORT
Subjective


Progress Note for:: 12/11/18


Subjective:: 





No adverse events overnight.  No new complaints.  Vital signs are stable.  He 

was sitting up on the edge of the bed eating lunch with his girlfriend in the 

room and they were having an argument because he does not want to go to rehab 

and she was telling him that she cannot take care of him at home.


Reason For Visit: 


FX OF RIGHT LEG








Physical Exam


Vital Signs: 


 











Temp Pulse Resp BP Pulse Ox


 


 98.2 F   78   18   129/66 H  100 


 


 12/11/18 16:19  12/11/18 16:19  12/11/18 16:19  12/11/18 16:19  12/11/18 16:19








 Intake & Output











 12/10/18 12/11/18 12/12/18





 06:59 06:59 06:59


 


Intake Total 3575 1056 1058


 


Output Total 2125 700 


 


Balance 3654 252 3295


 


Weight 60.7 kg 58.2 kg 








General appearance: PRESENT: no acute distress


Respiratory exam: PRESENT: clear to auscultation david, symmetrical


Cardiovascular exam: PRESENT: +S1, +S2


Pulses: PRESENT: +1 pedal pulses bilateral


GI/Abdominal exam: PRESENT: normal bowel sounds, soft.  ABSENT: tenderness


Extremities exam: ABSENT: pedal edema


Musculoskeletal exam: PRESENT: tenderness - Right lower extremity-tender to 

palpation but it did not seem to bother him when he was distracted.


Neurological exam: PRESENT: alert, awake, oriented to person, oriented to place

, oriented to time


Skin exam: PRESENT: dry, warm





Results


Laboratory Results: 


 





 12/08/18 08:17 





 12/11/18 10:01 





 











  12/11/18





  10:01


 


Sodium  129.6 L


 


Potassium  5.2 H


 


Chloride  91 L


 


Carbon Dioxide  25


 


Anion Gap  14


 


BUN  6 L


 


Creatinine  0.77


 


Est GFR ( Amer)  > 60


 


Est GFR (Non-Af Amer)  > 60


 


Glucose  137 H


 


Calcium  9.8











Impressions: 


 





Chest X-Ray  11/15/18 00:00


IMPRESSION:  No acute findings


 








Fluoroscopy  11/15/18 00:00


IMPRESSION:  ORIF tibial fracture.  Refer to operative note for further 

information.


 








Pelvis X-Ray  11/15/18 00:00


IMPRESSION:


 


Postsurgical changes with heterotopic bone formation of the


proximal femurs bilaterally. No radiographic evidence for acute


fracture. Correlate with any history of inability to ambulate.


 


 


 2011 [employer]- All Rights Reserved


 








Ankle X-Ray  11/15/18 06:35


IMPRESSION:


 


Displaced oblique fractures of the distal fibular and tibial


diaphyses as described above.


 








Head CT  11/15/18 06:35


IMPRESSION:


 


1. There is no evidence of acute intracranial pathology.


2. Stable enlarged ventricles out of proportion to the sulci


which can be seen with normal pressure hydrocephalus.


3. 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


infarcts.


 








Tibia/Fibula X-Ray  12/08/18 00:00


IMPRESSION:  Healing fractures of the distal tibia and fibular with internal 

fixation of the tibia.


 














Assessment & Plan





- Diagnosis


(1) Displaced fracture of tibia


Qualifiers: 


   Encounter type: subsequent encounter   Tibia location: distal physis (incl. 

Salter-Prieto)   Laterality: right 


Is this a current diagnosis for this admission?: Yes   


Plan: 


Status post operative repair, orthopedics following.  We are trying to get him 

into some sort of rehab setting.








(2) Bipolar 1 disorder


Is this a current diagnosis for this admission?: Yes   


Plan: 


Stable on his usual medications.








(3) Hyponatremia


Is this a current diagnosis for this admission?: Yes   


Plan: 


Improved with salt tablets.  We will check it again tomorrow if it continues to 

go up we will probably stop the salt tablets at that time.








- Time


Time Spent with patient: 15-24 minutes

## 2018-12-12 LAB
ANION GAP SERPL CALC-SCNC: 17 MMOL/L (ref 5–19)
BUN SERPL-MCNC: 7 MG/DL (ref 7–20)
CALCIUM: 9.2 MG/DL (ref 8.4–10.2)
CHLORIDE SERPL-SCNC: 88 MMOL/L (ref 98–107)
CO2 SERPL-SCNC: 23 MMOL/L (ref 22–30)
GLUCOSE SERPL-MCNC: 166 MG/DL (ref 75–110)
POTASSIUM SERPL-SCNC: 4.5 MMOL/L (ref 3.6–5)
SODIUM SERPL-SCNC: 127.5 MMOL/L (ref 137–145)

## 2018-12-12 RX ADMIN — OXCARBAZEPINE SCH MG: 150 TABLET, FILM COATED ORAL at 21:18

## 2018-12-12 RX ADMIN — BUSPIRONE HYDROCHLORIDE SCH MG: 10 TABLET ORAL at 21:18

## 2018-12-12 RX ADMIN — INSULIN GLARGINE SCH UNITS: 100 INJECTION, SOLUTION SUBCUTANEOUS at 21:18

## 2018-12-12 RX ADMIN — ASPRIN AND EXTENDED-RELEASE DIPYRIDAMOLE SCH CAP.SR: 25; 200 CAPSULE ORAL at 18:05

## 2018-12-12 RX ADMIN — SIMVASTATIN SCH MG: 40 TABLET, FILM COATED ORAL at 21:18

## 2018-12-12 RX ADMIN — TIOTROPIUM BROMIDE SCH INHALER: 18 CAPSULE ORAL; RESPIRATORY (INHALATION) at 09:10

## 2018-12-12 RX ADMIN — BUSPIRONE HYDROCHLORIDE SCH MG: 10 TABLET ORAL at 09:13

## 2018-12-12 RX ADMIN — OXYCODONE HYDROCHLORIDE PRN MG: 5 TABLET ORAL at 14:36

## 2018-12-12 RX ADMIN — LEVOTHYROXINE SODIUM SCH: 25 TABLET ORAL at 05:47

## 2018-12-12 RX ADMIN — FLUOXETINE SCH MG: 20 CAPSULE ORAL at 09:11

## 2018-12-12 RX ADMIN — METOPROLOL SUCCINATE SCH MG: 25 TABLET, EXTENDED RELEASE ORAL at 09:11

## 2018-12-12 RX ADMIN — BENZTROPINE MESYLATE SCH MG: 1 TABLET ORAL at 09:11

## 2018-12-12 RX ADMIN — DOCUSATE SODIUM SCH MG: 100 CAPSULE, LIQUID FILLED ORAL at 18:05

## 2018-12-12 RX ADMIN — OXCARBAZEPINE SCH MG: 150 TABLET, FILM COATED ORAL at 09:11

## 2018-12-12 RX ADMIN — LANSOPRAZOLE SCH MG: 30 TABLET, ORALLY DISINTEGRATING, DELAYED RELEASE ORAL at 09:05

## 2018-12-12 RX ADMIN — DIVALPROEX SODIUM SCH MG: 250 TABLET, FILM COATED, EXTENDED RELEASE ORAL at 09:12

## 2018-12-12 RX ADMIN — SODIUM CHLORIDE TAB 1 GM SCH GM: 1 TAB at 09:11

## 2018-12-12 RX ADMIN — LISINOPRIL SCH MG: 5 TABLET ORAL at 09:13

## 2018-12-12 RX ADMIN — DOCUSATE SODIUM SCH MG: 100 CAPSULE, LIQUID FILLED ORAL at 09:13

## 2018-12-12 RX ADMIN — BENZTROPINE MESYLATE SCH MG: 1 TABLET ORAL at 18:05

## 2018-12-12 RX ADMIN — INSULIN LISPRO PRN UNIT: 100 INJECTION, SOLUTION INTRAVENOUS; SUBCUTANEOUS at 11:51

## 2018-12-12 RX ADMIN — ASPRIN AND EXTENDED-RELEASE DIPYRIDAMOLE SCH CAP.SR: 25; 200 CAPSULE ORAL at 09:12

## 2018-12-12 NOTE — PDOC PROGRESS REPORT
Subjective


Progress Note for:: 12/12/18


Subjective:: 





No adverse events overnight.  No new complaints.  He says he is feeling pretty 

good today.  No nausea.  No complaints of any pain.


Reason For Visit: 


FX OF RIGHT LEG








Physical Exam


Vital Signs: 


 











Temp Pulse Resp BP Pulse Ox


 


 97.5 F   78   18   118/90 H  100 


 


 12/12/18 11:29  12/12/18 11:29  12/12/18 11:29  12/12/18 11:29  12/12/18 11:29








 Intake & Output











 12/11/18 12/12/18 12/13/18





 06:59 06:59 06:59


 


Intake Total 1056 2152 577


 


Output Total 700 1250 900


 


Balance 356 902 -323


 


Weight 58.2 kg 59.9 kg 








General appearance: PRESENT: no acute distress


Respiratory exam: PRESENT: clear to auscultation david, symmetrical


Cardiovascular exam: PRESENT: +S1, +S2


Pulses: PRESENT: +1 pedal pulses bilateral


GI/Abdominal exam: PRESENT: normal bowel sounds, soft.  ABSENT: tenderness


Extremities exam: ABSENT: pedal edema


Musculoskeletal exam: PRESENT: tenderness - Right lower extremity-tender to 

palpation but it did not seem to bother him when he was distracted.


Neurological exam: PRESENT: alert, awake, oriented to person, oriented to place

, oriented to time


Skin exam: PRESENT: dry, warm





Results


Laboratory Results: 


 





 12/08/18 08:17 





 12/12/18 10:25 





 











  12/12/18





  10:25


 


Sodium  127.5 L


 


Potassium  4.5


 


Chloride  88 L


 


Carbon Dioxide  23


 


Anion Gap  17


 


BUN  7


 


Creatinine  0.76


 


Est GFR ( Amer)  > 60


 


Est GFR (Non-Af Amer)  > 60


 


Glucose  166 H


 


Calcium  9.2











Impressions: 


 





Chest X-Ray  11/15/18 00:00


IMPRESSION:  No acute findings


 








Fluoroscopy  11/15/18 00:00


IMPRESSION:  ORIF tibial fracture.  Refer to operative note for further 

information.


 








Pelvis X-Ray  11/15/18 00:00


IMPRESSION:


 


Postsurgical changes with heterotopic bone formation of the


proximal femurs bilaterally. No radiographic evidence for acute


fracture. Correlate with any history of inability to ambulate.


 


 


 2011 [employer]- All Rights Reserved


 








Ankle X-Ray  11/15/18 06:35


IMPRESSION:


 


Displaced oblique fractures of the distal fibular and tibial


diaphyses as described above.


 








Head CT  11/15/18 06:35


IMPRESSION:


 


1. There is no evidence of acute intracranial pathology.


2. Stable enlarged ventricles out of proportion to the sulci


which can be seen with normal pressure hydrocephalus.


3. 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


infarcts.


 








Tibia/Fibula X-Ray  12/08/18 00:00


IMPRESSION:  Healing fractures of the distal tibia and fibular with internal 

fixation of the tibia.


 














Assessment & Plan





- Diagnosis


(1) Displaced fracture of tibia


Qualifiers: 


   Encounter type: subsequent encounter   Tibia location: distal physis (incl. 

Salter-Prieto)   Laterality: right 


Is this a current diagnosis for this admission?: Yes   


Plan: 


Status post operative repair, orthopedics following.  We are trying to get him 

into some sort of rehab setting.








(2) Bipolar 1 disorder


Is this a current diagnosis for this admission?: Yes   


Plan: 


Stable on his usual medications.








(3) Hyponatremia


Is this a current diagnosis for this admission?: Yes   


Plan: 


Sodium down just a little bit from yesterday but still improved from a couple 

days ago overall.  We will continue being mindful of his fluid intake as well 

as continuing with salt tablets.








- Time


Time Spent with patient: 15-24 minutes

## 2018-12-13 LAB
ANION GAP SERPL CALC-SCNC: 16 MMOL/L (ref 5–19)
BUN SERPL-MCNC: 7 MG/DL (ref 7–20)
CALCIUM: 9 MG/DL (ref 8.4–10.2)
CHLORIDE SERPL-SCNC: 87 MMOL/L (ref 98–107)
CO2 SERPL-SCNC: 21 MMOL/L (ref 22–30)
FREE T4 (FREE THYROXINE): 1.31 NG/DL (ref 0.78–2.19)
GLUCOSE SERPL-MCNC: 123 MG/DL (ref 75–110)
POTASSIUM SERPL-SCNC: 4.4 MMOL/L (ref 3.6–5)
SODIUM SERPL-SCNC: 123.8 MMOL/L (ref 137–145)
TSH SERPL-ACNC: 2.98 UIU/ML (ref 0.47–4.68)

## 2018-12-13 RX ADMIN — FLUOXETINE SCH MG: 20 CAPSULE ORAL at 10:30

## 2018-12-13 RX ADMIN — INSULIN GLARGINE SCH: 100 INJECTION, SOLUTION SUBCUTANEOUS at 23:00

## 2018-12-13 RX ADMIN — LANSOPRAZOLE SCH MG: 30 TABLET, ORALLY DISINTEGRATING, DELAYED RELEASE ORAL at 07:59

## 2018-12-13 RX ADMIN — LEVOTHYROXINE SODIUM SCH MG: 25 TABLET ORAL at 06:13

## 2018-12-13 RX ADMIN — ASPRIN AND EXTENDED-RELEASE DIPYRIDAMOLE SCH CAP.SR: 25; 200 CAPSULE ORAL at 17:13

## 2018-12-13 RX ADMIN — OXCARBAZEPINE SCH MG: 150 TABLET, FILM COATED ORAL at 10:32

## 2018-12-13 RX ADMIN — METOPROLOL SUCCINATE SCH MG: 25 TABLET, EXTENDED RELEASE ORAL at 10:31

## 2018-12-13 RX ADMIN — SODIUM CHLORIDE TAB 1 GM SCH GM: 1 TAB at 10:29

## 2018-12-13 RX ADMIN — BUSPIRONE HYDROCHLORIDE SCH MG: 10 TABLET ORAL at 10:30

## 2018-12-13 RX ADMIN — BENZTROPINE MESYLATE SCH MG: 1 TABLET ORAL at 17:13

## 2018-12-13 RX ADMIN — DOCUSATE SODIUM SCH MG: 100 CAPSULE, LIQUID FILLED ORAL at 17:13

## 2018-12-13 RX ADMIN — OXYCODONE HYDROCHLORIDE PRN MG: 5 TABLET ORAL at 13:49

## 2018-12-13 RX ADMIN — SIMVASTATIN SCH MG: 40 TABLET, FILM COATED ORAL at 22:36

## 2018-12-13 RX ADMIN — DIVALPROEX SODIUM SCH MG: 250 TABLET, FILM COATED, EXTENDED RELEASE ORAL at 10:31

## 2018-12-13 RX ADMIN — BUSPIRONE HYDROCHLORIDE SCH MG: 10 TABLET ORAL at 22:36

## 2018-12-13 RX ADMIN — ASPRIN AND EXTENDED-RELEASE DIPYRIDAMOLE SCH CAP.SR: 25; 200 CAPSULE ORAL at 10:30

## 2018-12-13 RX ADMIN — LISINOPRIL SCH MG: 5 TABLET ORAL at 10:30

## 2018-12-13 RX ADMIN — BENZTROPINE MESYLATE SCH MG: 1 TABLET ORAL at 10:32

## 2018-12-13 RX ADMIN — DOCUSATE SODIUM SCH MG: 100 CAPSULE, LIQUID FILLED ORAL at 10:30

## 2018-12-13 RX ADMIN — INSULIN LISPRO PRN UNIT: 100 INJECTION, SOLUTION INTRAVENOUS; SUBCUTANEOUS at 17:14

## 2018-12-13 RX ADMIN — OXCARBAZEPINE SCH MG: 150 TABLET, FILM COATED ORAL at 22:36

## 2018-12-13 RX ADMIN — TIOTROPIUM BROMIDE SCH INHALER: 18 CAPSULE ORAL; RESPIRATORY (INHALATION) at 10:36
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
## 2018-12-13 NOTE — PDOC PROGRESS REPORT
Subjective


Progress Note for:: 12/13/18


Subjective:: 





Patient states he is feeling fine today.  He had a hard time waking up this 

morning but he states this is normal.  No confusion.  No chest pain or 

difficulty breathing.  States that his appetite is pretty good and he has been 

drinking a little bit of water.  His nurse has not noticed an excessive amount 

of fluid intake.  Last night Scott catheter was removed and he had urinary 

retention and so had an in out catheter.  He has not urinated since.  We are 

monitoring his urine accumulation with bladder scan.


Reason For Visit: 


FX OF RIGHT LEG








Physical Exam


Vital Signs: 


 











Temp Pulse Resp BP Pulse Ox


 


 97.3 F   74   19   120/80   99 


 


 12/13/18 16:00  12/13/18 16:00  12/13/18 16:00  12/13/18 16:00  12/13/18 16:00








 Intake & Output











 12/12/18 12/13/18 12/14/18





 06:59 06:59 06:59


 


Intake Total 2152 1331 


 


Output Total 1250 1500 425


 


Balance 902 -169 -425


 


Weight 59.9 kg 61.6 kg 











General appearance: PRESENT: no acute distress, well-developed, well-nourished.

  ABSENT: cooperative


Head exam: PRESENT: atraumatic, normocephalic


Eye exam: ABSENT: conjunctival injection, scleral icterus


Ear exam: PRESENT: normal external ear exam.  ABSENT: bleeding


Mouth exam: PRESENT: dry mucosa


Respiratory exam: PRESENT: clear to auscultation david, unlabored.  ABSENT: rales

, rhonchi, wheezes


Cardiovascular exam: PRESENT: RRR.  ABSENT: systolic murmur


Pulses: PRESENT: normal radial pulses


GI/Abdominal exam: PRESENT: normal bowel sounds, soft.  ABSENT: distended, 

tenderness


Rectal exam: PRESENT: deferred


Gentrourinary exam: ABSENT: indwelling catheter


Neurological exam: PRESENT: alert, awake, oriented to person, oriented to place


Psychiatric exam: PRESENT: appropriate affect.  ABSENT: anxious


Skin exam: PRESENT: dry, intact, warm, other - Right knee with healing surgical 

incision site





Results


Laboratory Results: 


 





 12/08/18 08:17 





 12/13/18 09:21 





 











  12/13/18 12/13/18





  09:21 09:21


 


Sodium  123.8 L 


 


Potassium  4.4 


 


Chloride  87 L 


 


Carbon Dioxide  21 L 


 


Anion Gap  16 


 


BUN  7 


 


Creatinine  0.66 


 


Est GFR ( Amer)  > 60 


 


Est GFR (Non-Af Amer)  > 60 


 


Glucose  123 H 


 


Calcium  9.0 


 


TSH   2.98


 


Free T4   1.31











Impressions: 


 





Chest X-Ray  11/15/18 00:00


IMPRESSION:  No acute findings


 








Fluoroscopy  11/15/18 00:00


IMPRESSION:  ORIF tibial fracture.  Refer to operative note for further 

information.


 








Pelvis X-Ray  11/15/18 00:00


IMPRESSION:


 


Postsurgical changes with heterotopic bone formation of the


proximal femurs bilaterally. No radiographic evidence for acute


fracture. Correlate with any history of inability to ambulate.


 


 


 2011 [employer]- All Rights Reserved


 








Ankle X-Ray  11/15/18 06:35


IMPRESSION:


 


Displaced oblique fractures of the distal fibular and tibial


diaphyses as described above.


 








Head CT  11/15/18 06:35


IMPRESSION:


 


1. There is no evidence of acute intracranial pathology.


2. Stable enlarged ventricles out of proportion to the sulci


which can be seen with normal pressure hydrocephalus.


3. 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


infarcts.


 








Tibia/Fibula X-Ray  12/08/18 00:00


IMPRESSION:  Healing fractures of the distal tibia and fibular with internal 

fixation of the tibia.


 














Assessment & Plan





- Diagnosis


(1) Displaced fracture of tibia


Qualifiers: 


   Encounter type: subsequent encounter   Tibia location: distal physis (incl. 

Salter-Prieto)   Laterality: right 


Is this a current diagnosis for this admission?: Yes   


Plan: 


Patient is improving from this perspective.  He has been awaiting discharge to 

rehab and permanent placement and no longer needs acute care for his tibia 

fracture.  He will need orthopedic follow-up as an outpatient.  He now has 

placement arranged but is not being discharged secondary to workup of 

hyponatremia.








(2) Bipolar 1 disorder


Is this a current diagnosis for this admission?: Yes   


Plan: 


Patient's bipolar disorder is stable, he is followed by the psychologist and he 

is on his home medications.








(3) Hyponatremia


Is this a current diagnosis for this admission?: Yes   


Plan: 


Patient has been hyponatremic for most of the hospitalization.  His edema has 

dropped over the last few days.  He appears to be euvolemic.  He originally had 

improvement in the sodium with the addition of salt tablets though diagnosis at 

this time is not entirely clear.  He probably has SIADH which is chronic.  I 

have consulted nephrology to assist with hyponatremia diagnosis and treatment.








(4) Dyslipidemia


Is this a current diagnosis for this admission?: Yes   





(5) Hypertension


Qualifiers: 


   Hypertension type: essential hypertension   Qualified Code(s): I10 - 

Essential (primary) hypertension   


Is this a current diagnosis for this admission?: Yes   


Plan: 


Controlled, continue current medications








(6) Spina bifida


Qualifiers: 


   Spinal region: unspecified   Presence of hydrocephalus: unspecified 

hydrocephalus presence   Qualified Code(s): Q05.9 - Spina bifida, unspecified   


Is this a current diagnosis for this admission?: Yes   





(7) Type 2 diabetes mellitus


Is this a current diagnosis for this admission?: Yes   


Plan: 


Relatively well controlled, continue long and short acting insulin








(8) Hypothyroidism


Is this a current diagnosis for this admission?: Yes   


Plan: 


On his thyroid supplementation, TSH and free T4 normal








(9) Hydrocephalus


Is this a current diagnosis for this admission?: Yes   





- Time


Time Spent with patient: 35 or more minutes


Medications reviewed and adjusted accordingly: Yes





- Inpatient Certification


Based on my medical assessment, after consideration of the patient's 

comorbidities, presenting symptoms, or acuity I expect that the services needed 

warrant INPATIENT care.: Yes


I certify that my determination is in accordance with my understanding of 

Medicare's requirements for reasonable and necessary INPATIENT services [42 CFR 

412.3e].: Yes


Medical Necessity: Risk of Complication if Not Cared For in Hospital

## 2018-12-14 LAB
ANION GAP SERPL CALC-SCNC: 13 MMOL/L (ref 5–19)
ANION GAP SERPL CALC-SCNC: 14 MMOL/L (ref 5–19)
ANION GAP SERPL CALC-SCNC: 18 MMOL/L (ref 5–19)
APPEARANCE UR: (no result)
APTT PPP: YELLOW S
BILIRUB UR QL STRIP: NEGATIVE
BUN SERPL-MCNC: 11 MG/DL (ref 7–20)
BUN SERPL-MCNC: 9 MG/DL (ref 7–20)
BUN SERPL-MCNC: 9 MG/DL (ref 7–20)
CALCIUM: 9.4 MG/DL (ref 8.4–10.2)
CALCIUM: 9.4 MG/DL (ref 8.4–10.2)
CALCIUM: 9.7 MG/DL (ref 8.4–10.2)
CHLORIDE SERPL-SCNC: 90 MMOL/L (ref 98–107)
CHLORIDE SERPL-SCNC: 92 MMOL/L (ref 98–107)
CHLORIDE SERPL-SCNC: 96 MMOL/L (ref 98–107)
CO2 SERPL-SCNC: 20 MMOL/L (ref 22–30)
CO2 SERPL-SCNC: 21 MMOL/L (ref 22–30)
CO2 SERPL-SCNC: 24 MMOL/L (ref 22–30)
GLUCOSE SERPL-MCNC: 135 MG/DL (ref 75–110)
GLUCOSE SERPL-MCNC: 138 MG/DL (ref 75–110)
GLUCOSE SERPL-MCNC: 160 MG/DL (ref 75–110)
GLUCOSE UR STRIP-MCNC: NEGATIVE MG/DL
KETONES UR STRIP-MCNC: NEGATIVE MG/DL
NITRITE UR QL STRIP: NEGATIVE
PH UR STRIP: 7 [PH] (ref 5–9)
POTASSIUM SERPL-SCNC: 4.3 MMOL/L (ref 3.6–5)
POTASSIUM SERPL-SCNC: 4.6 MMOL/L (ref 3.6–5)
POTASSIUM SERPL-SCNC: 4.6 MMOL/L (ref 3.6–5)
PROT UR STRIP-MCNC: NEGATIVE MG/DL
SODIUM SERPL-SCNC: 127.2 MMOL/L (ref 137–145)
SODIUM SERPL-SCNC: 130.1 MMOL/L (ref 137–145)
SODIUM SERPL-SCNC: 131.2 MMOL/L (ref 137–145)
SP GR UR STRIP: 1
UROBILINOGEN UR-MCNC: NEGATIVE MG/DL (ref ?–2)

## 2018-12-14 RX ADMIN — DOCUSATE SODIUM SCH MG: 100 CAPSULE, LIQUID FILLED ORAL at 11:08

## 2018-12-14 RX ADMIN — BUSPIRONE HYDROCHLORIDE SCH MG: 10 TABLET ORAL at 21:34

## 2018-12-14 RX ADMIN — BENZTROPINE MESYLATE SCH MG: 1 TABLET ORAL at 18:24

## 2018-12-14 RX ADMIN — OXCARBAZEPINE SCH MG: 150 TABLET, FILM COATED ORAL at 11:10

## 2018-12-14 RX ADMIN — ASPRIN AND EXTENDED-RELEASE DIPYRIDAMOLE SCH CAP.SR: 25; 200 CAPSULE ORAL at 18:24

## 2018-12-14 RX ADMIN — INSULIN LISPRO PRN UNIT: 100 INJECTION, SOLUTION INTRAVENOUS; SUBCUTANEOUS at 11:16

## 2018-12-14 RX ADMIN — BUSPIRONE HYDROCHLORIDE SCH MG: 10 TABLET ORAL at 11:09

## 2018-12-14 RX ADMIN — CYCLOBENZAPRINE HYDROCHLORIDE PRN MG: 10 TABLET, FILM COATED ORAL at 21:35

## 2018-12-14 RX ADMIN — METOPROLOL SUCCINATE SCH MG: 25 TABLET, EXTENDED RELEASE ORAL at 11:09

## 2018-12-14 RX ADMIN — BACLOFEN PRN MG: 10 TABLET ORAL at 18:24

## 2018-12-14 RX ADMIN — TIOTROPIUM BROMIDE SCH CAP: 18 CAPSULE ORAL; RESPIRATORY (INHALATION) at 11:10

## 2018-12-14 RX ADMIN — BENZTROPINE MESYLATE SCH MG: 1 TABLET ORAL at 11:09

## 2018-12-14 RX ADMIN — DIVALPROEX SODIUM SCH MG: 250 TABLET, FILM COATED, EXTENDED RELEASE ORAL at 11:09

## 2018-12-14 RX ADMIN — ASPRIN AND EXTENDED-RELEASE DIPYRIDAMOLE SCH CAP.SR: 25; 200 CAPSULE ORAL at 11:09

## 2018-12-14 RX ADMIN — OXCARBAZEPINE SCH MG: 150 TABLET, FILM COATED ORAL at 21:35

## 2018-12-14 RX ADMIN — SIMVASTATIN SCH MG: 40 TABLET, FILM COATED ORAL at 21:35

## 2018-12-14 RX ADMIN — LISINOPRIL SCH MG: 5 TABLET ORAL at 11:10

## 2018-12-14 RX ADMIN — FLUOXETINE SCH MG: 20 CAPSULE ORAL at 11:09

## 2018-12-14 RX ADMIN — LEVOTHYROXINE SODIUM SCH MG: 25 TABLET ORAL at 06:29

## 2018-12-14 RX ADMIN — OXYCODONE HYDROCHLORIDE PRN MG: 5 TABLET ORAL at 21:35

## 2018-12-14 RX ADMIN — LANSOPRAZOLE SCH MG: 30 TABLET, ORALLY DISINTEGRATING, DELAYED RELEASE ORAL at 11:08

## 2018-12-14 RX ADMIN — DOCUSATE SODIUM SCH MG: 100 CAPSULE, LIQUID FILLED ORAL at 18:24

## 2018-12-14 RX ADMIN — INSULIN GLARGINE SCH UNITS: 100 INJECTION, SOLUTION SUBCUTANEOUS at 21:36

## 2018-12-14 RX ADMIN — SODIUM CHLORIDE TAB 1 GM SCH GM: 1 TAB at 11:09

## 2018-12-14 NOTE — PDOC PROGRESS REPORT
Subjective


Progress Note for:: 12/14/18


Subjective:: 





Feeling okay today, angry about still being in the hospital but also angry that 

he does not have a home to go to.  No chest pain or difficulty breathing.  

Appetite fine.  No confusion.  Really no medical complaints today.


Reason For Visit: 


FX OF RIGHT LEG








Physical Exam


Vital Signs: 


 











Temp Pulse Resp BP Pulse Ox


 


 97.7 F   84   20   113/67   100 


 


 12/14/18 11:09  12/14/18 11:09  12/14/18 11:09  12/14/18 07:57  12/14/18 11:09








 Intake & Output











 12/13/18 12/14/18 12/15/18





 06:59 06:59 06:59


 


Intake Total 0334 318 7528


 


Output Total 1500 1825 2000


 


Balance -532 -2273 -193


 


Weight 61.6 kg 62.1 kg 











General appearance: PRESENT: no acute distress, thin


Head exam: PRESENT: atraumatic, normocephalic


Eye exam: ABSENT: conjunctival injection, scleral icterus


Mouth exam: PRESENT: moist


Respiratory exam: PRESENT: clear to auscultation david, unlabored.  ABSENT: rales

, rhonchi, wheezes


Cardiovascular exam: PRESENT: RRR.  ABSENT: systolic murmur


Pulses: PRESENT: normal radial pulses


GI/Abdominal exam: PRESENT: normal bowel sounds, soft.  ABSENT: distended, 

tenderness


Rectal exam: PRESENT: deferred


Neurological exam: PRESENT: alert, awake, oriented to person, oriented to place

, oriented to situation


Psychiatric exam: PRESENT: agitated


Skin exam: PRESENT: dry, warm, other - Healing right knee postop incision site





Results


Laboratory Results: 


 





 12/08/18 08:17 





 12/14/18 14:38 





 











  12/14/18 12/14/18 12/14/18





  02:13 07:40 08:20


 


Sodium  127.2 L   130.1 L


 


Potassium  4.6   4.6


 


Chloride  90 L   96 L


 


Carbon Dioxide  24   20 L


 


Anion Gap  13   14


 


BUN  9   9


 


Creatinine  0.78   0.88


 


Est GFR ( Amer)  > 60   > 60


 


Est GFR (Non-Af Amer)  > 60   > 60


 


Glucose  135 H   138 H


 


Calcium  9.4   9.4


 


Urine Color   YELLOW 


 


Urine Appearance   CLOUDY 


 


Urine pH   7.0 


 


Ur Specific Gravity   1.002 


 


Urine Protein   NEGATIVE 


 


Urine Glucose (UA)   NEGATIVE 


 


Urine Ketones   NEGATIVE 


 


Urine Blood   LARGE H 


 


Urine Nitrite   NEGATIVE 


 


Ur Leukocyte Esterase   LARGE H 


 


Urine WBC (Auto)   51 


 


Urine RBC (Auto)   2 














  12/14/18





  14:38


 


Sodium  131.2 L


 


Potassium  4.3


 


Chloride  92 L


 


Carbon Dioxide  21 L


 


Anion Gap  18


 


BUN  11


 


Creatinine  0.90


 


Est GFR ( Amer)  > 60


 


Est GFR (Non-Af Amer)  > 60


 


Glucose  160 H


 


Calcium  9.7


 


Urine Color 


 


Urine Appearance 


 


Urine pH 


 


Ur Specific Gravity 


 


Urine Protein 


 


Urine Glucose (UA) 


 


Urine Ketones 


 


Urine Blood 


 


Urine Nitrite 


 


Ur Leukocyte Esterase 


 


Urine WBC (Auto) 


 


Urine RBC (Auto) 











Impressions: 


 





Chest X-Ray  11/15/18 00:00


IMPRESSION:  No acute findings


 








Fluoroscopy  11/15/18 00:00


IMPRESSION:  ORIF tibial fracture.  Refer to operative note for further 

information.


 








Pelvis X-Ray  11/15/18 00:00


IMPRESSION:


 


Postsurgical changes with heterotopic bone formation of the


proximal femurs bilaterally. No radiographic evidence for acute


fracture. Correlate with any history of inability to ambulate.


 


 


 2011 [employer]- All Rights Reserved


 








Ankle X-Ray  11/15/18 06:35


IMPRESSION:


 


Displaced oblique fractures of the distal fibular and tibial


diaphyses as described above.


 








Head CT  11/15/18 06:35


IMPRESSION:


 


1. There is no evidence of acute intracranial pathology.


2. Stable enlarged ventricles out of proportion to the sulci


which can be seen with normal pressure hydrocephalus.


3. 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


infarcts.


 








Tibia/Fibula X-Ray  12/08/18 00:00


IMPRESSION:  Healing fractures of the distal tibia and fibular with internal 

fixation of the tibia.


 














Assessment & Plan





- Diagnosis


(1) Displaced fracture of tibia


Qualifiers: 


   Encounter type: subsequent encounter   Tibia location: distal physis (incl. 

Salter-Prieto)   Laterality: right 


Is this a current diagnosis for this admission?: Yes   


Plan: 


Continue PT, awaiting discharge to skilled nursing, continue with pain control, 

will need outpatient Orth O follow-up








(2) Bipolar 1 disorder


Is this a current diagnosis for this admission?: Yes   


Plan: 


Stable from this perspective, continue his current meds








(3) Hyponatremia


Is this a current diagnosis for this admission?: Yes   


Plan: 


Sodium has come up with tolvaptan 15 mg x2.  Appreciate nephrology consult.  

Patient has SI ADH.  This is exacerbated by psychiatric meds.








(4) Dyslipidemia


Is this a current diagnosis for this admission?: Yes   


Plan: 


Continue statin








(5) Hypertension


Qualifiers: 


   Hypertension type: essential hypertension   Qualified Code(s): I10 - 

Essential (primary) hypertension   


Is this a current diagnosis for this admission?: Yes   


Plan: 


Stable, continue lisinopril and metoprolol








(6) Spina bifida


Qualifiers: 


   Spinal region: unspecified   Presence of hydrocephalus: unspecified 

hydrocephalus presence   Qualified Code(s): Q05.9 - Spina bifida, unspecified   


Is this a current diagnosis for this admission?: Yes   


Plan: 


Continue Flexeril and baclofen








(7) Type 2 diabetes mellitus


Is this a current diagnosis for this admission?: Yes   


Plan: 


Blood glucose well controlled, continue his glargine and sliding scale short 

acting insulin at current doses.








(8) Hypothyroidism


Is this a current diagnosis for this admission?: Yes   


Plan: 


TSH and free T4 normal.  Continue his current dose of levothyroxine








(9) Hydrocephalus


Is this a current diagnosis for this admission?: Yes   


Plan: 


Patient needs to have an outpatient appointment set up for  shunt evaluation, 

we will work on this for discharge.








(10) Neurogenic bladder


Is this a current diagnosis for this admission?: Yes   


Plan: 


Patient has Scott catheter removed now.  We will perform bladder scan every 6 

hours and allow him to perform an I&O cath if urine is greater than 400 mL.








- Time


Time Spent with patient: 35 or more minutes





- Inpatient Certification


Based on my medical assessment, after consideration of the patient's 

comorbidities, presenting symptoms, or acuity I expect that the services needed 

warrant INPATIENT care.: Yes


I certify that my determination is in accordance with my understanding of 

Medicare's requirements for reasonable and necessary INPATIENT services [42 CFR 

412.3e].: Yes


Medical Necessity: Need Close Monitoring Due to Risk of Patient Decompensation

## 2018-12-14 NOTE — PDOC DISCHARGE SUMMARY
General





- Admit/Disc Date/PCP


Admission Date/Primary Care Provider: 


  11/15/18 07:46





  





Discharge Date: 12/15/18





- Discharge Diagnosis


(1) Displaced fracture of tibia


Is this a current diagnosis for this admission?: Yes   


Summary: 


Pt had orif right leg fracture, was followed by ortho in patient, worked with 

PT and had pain management plan in place. On last ortho eval of the patient 

during this hospitalization the note reads: "reviewed patient's radiographs 

from yesterday which demonstrate evidence of healing however there increased 

dorsal displacement compared to intraoperative radiographs.  No evidence of 

intra-articular screw penetration or loosening.  We will discuss findings of 

radiographs with Dr. Sanchez determine appropriate treatment plan."  We will 

arrange for ortho follow up on discharge. 








(2) Bipolar 1 disorder


Is this a current diagnosis for this admission?: Yes   


Summary: 


 Pt was seen by psych consult service a few times during the hospitalization 

and there were some med changes made, he is curretnly stable on trileptal, 

prozac, cogentin, buspar and depakote.  The below is the last note from the 

psych service.  He will need psych follow up as an out pt. 





"Patient is cleared from acute psychiatric services.  Patient does not meet IVC 

criteria per [place] GS 122C.  Patient is not demonstrating any behaviours 

indicating he is responding to internal stimuli (ie organized and linear 

conversation, good eye contact). HE denies thoughts of wanting to harm himself 

or others.  Patient is recommended to follow up with neurology as multiple head 

CT's over the past few years suggest enlarged ventricles, and scattered white 

matter. Prescribing physicians are asked to consider avoiding benzodiazepines 

and antipsychotics as these can increase symptoms of aggression, confusion, and 

psychosis with patients that have neurodegenerative diagnoses such as dementia 

and TBI. Dr. Serrato was consulted on the care and management of this patient; 

attending physician is in agreement with recommendations and disposition."











(3) Hyponatremia


Is this a current diagnosis for this admission?: Yes   


Summary: 


Pt was seen by nephrologist for hyponatremia.  He was diagnosed with SIADH, 

found to be euvolemic.  This is exacerbated by his psych meds also.  He was 

given 2 doses of tolvaptan 15 mg and sodium has come up to low 130s.  Will be 

stable for DC tomorrow with close out patient followup. 








(4) Dyslipidemia


Is this a current diagnosis for this admission?: Yes   


Summary: 


continue his home dosing of simvastatin 40 mg HS








(5) Hypertension


Is this a current diagnosis for this admission?: Yes   


Summary: 


Will DC on his home meds, lisinopril 2.5 mg and metoprolol 12.5 mg.  He is 

normotensive.  








(6) Spina bifida


Is this a current diagnosis for this admission?: Yes   


Summary: 


cont baclofen and flexeril at current doses, will need close out pt followup








(7) Type 2 diabetes mellitus


Is this a current diagnosis for this admission?: Yes   


Summary: 


stable on current medications, glargine 10 units HS and sliding scale short 

acting insulin. will DC on these meds. 








(8) Hypothyroidism


Is this a current diagnosis for this admission?: Yes   


Summary: 


TSH and free T4 normal, cont thyroid replacement at levothyroxine 0.025 mg for 

discharge








(9) Hydrocephalus


Is this a current diagnosis for this admission?: Yes   


Summary: 


out pt appt pending for eval for  shunt, we will try to make this appt prior 

to DC








- Additional Information


Resuscitation Status: Full Code


Home Medications: 








Acetaminophen [Tylenol Extra Strength 500 mg Tablet] 1,000 mg PO Q4HP PRN 11/15/

18 


Aspirin/Dipyridamole [Aggrenox 25 mg/200 mg Capsule SA] 1 cap PO BID 11/15/18 


Baclofen [Baclofen 10 mg Tablet] 10 mg PO BIDP PRN 11/15/18 


Benztropine Mesylate [Cogentin 1 mg Tablet] 1 mg PO BID 11/15/18 


Buspirone HCl [Buspar 10 mg Tablet] 5 mg PO Q12 11/15/18 


Docusate Sodium [Colace 100 mg Capsule] 100 mg PO DAILYP PRN 11/15/18 


Duloxetine HCl [Cymbalta 20 mg Capsule.dr] 20 mg PO DAILY 11/15/18 


Ergocalciferol (Vitamin D2) [Drisdol] 50,000 unit PO SA 11/15/18 


Esomeprazole Mag Trihydrate [Nexium] 40 mg PO Q6AM 11/15/18 


Fluoxetine HCl [Prozac] 40 mg PO BID 11/15/18 


Insulin Aspart [Novolog Flexpen] 6 units SQ MEALS 11/15/18 


Insulin Glargine,Hum.rec.anlog [Lantus Insulin 100 Unit/mL] 30 units SQ QHS 11/

15/18 


Levothyroxine Sodium [Synthroid 0.025 mg Tablet] 0.025 mcg PO Q6AM 11/15/18 


Lisinopril [Zestril] 2.5 mg PO DAILY 11/15/18 


Metoprolol Succinate [Toprol Xl 25 mg Tab.sr] 12.5 mg PO DAILY 11/15/18 


Olanzapine [Zyprexa 5 mg Tablet] 5 mg PO QAM 11/15/18 


Olanzapine [Zyprexa] 15 mg PO QHS 11/15/18 


Ondansetron [Zofran Odt 4 mg Tablet] 4 mg PO Q4HP PRN 11/15/18 


Oxcarbazepine [Trileptal 150 mg Tablet] 150 mg PO BID 11/15/18 


Polyethylene Glycol 3350 [Miralax Powder 17 gm/Packet] 17 gm PO DAILYP PRN 11/15

/18 


Potassium Chloride [Klor-Con 10 Meq Capsule ER] 10 meq PO DAILY 11/15/18 


Simvastatin [Zocor 40 mg Tablet] 40 mg PO QHS 11/15/18 


Sulfamethoxazole/Trimethoprim [Bactrim Ds Tablet] 1 tab PO BID 11/15/18 


Tamsulosin HCl [Flomax 0.4 mg Cap.sr] 0.4 mg PO QPM 11/15/18 


Tiotropium Bromide [Spiriva Handihaler 5 Cap/Kit (18 Mcg/Cap)] 18 mcg IH DAILY 

11/15/18 











History of Present Illness


History of Present Illness: 





TARA SCOTT is a 48 year old male with a complex medical history.  He has 

hydrocephalus and spina bifida.  He is awaiting spine surgery and placement of 

a  shunt by neurosurgery.  He has an unsteady gait.  It has been recommended 

that he uses a walker.  Currently they are staying in a hotel in a very small 

room.  Because the distance from the bed to the bathroom is limited it makes 

use of a walker quite difficult.  This morning he was going to the bathroom to 

perform straight catheterization.  He had a "episode "and fell to the floor.  

As noted above he has a very unsteady gait due to his underlying illnesses.  

His significant other reports that he has fallen at least 3 times this week 

alone.


Upon falling he had acute onset right leg pain with external rotation of his 

foot.  He was unable to transfer to the bed.  EMS was called and he was 

transferred to the hospital.





He was found to have sustained a right lower extremity injury characterized 

with an inability to bear weight and a clear deformity and topical anatomy.  He 

was brought to the emergency room where a distal third tib-fib fracture was 

identified.  Orthopedics is consulted for fracture management.  he was admitted 

to [facility] with ortho consulting. 





Hospital Course


Hospital Course: 





Please see hospital course by problem list.





Physical Exam


Vital Signs: 


 











Temp Pulse Resp BP Pulse Ox


 


 97.7 F   84   20   113/67   100 


 


 12/14/18 11:09  12/14/18 11:09  12/14/18 11:09  12/14/18 07:57  12/14/18 11:09








 Intake & Output











 12/13/18 12/14/18 12/15/18





 06:59 06:59 06:59


 


Intake Total 0379 787 1359


 


Output Total 1500 1825 2000


 


Balance -169 -1105 -963


 


Weight 61.6 kg 62.1 kg 











General appearance: PRESENT: no acute distress, thin, other - pt is usually 

lying on his left side, appears kyphotic and stiff.  ABSENT: cooperative


Head exam: PRESENT: atraumatic, normocephalic


Eye exam: ABSENT: conjunctival injection, scleral icterus


Mouth exam: PRESENT: moist


Respiratory exam: PRESENT: clear to auscultation david, unlabored.  ABSENT: rales

, rhonchi, wheezes


Cardiovascular exam: PRESENT: RRR.  ABSENT: systolic murmur


Pulses: PRESENT: normal radial pulses


GI/Abdominal exam: PRESENT: normal bowel sounds, soft.  ABSENT: distended, 

tenderness


Rectal exam: PRESENT: deferred


Gentrourinary exam: ABSENT: indwelling catheter


Extremities exam: PRESENT: other - right knee with healing surgical incision.  

ABSENT: pedal edema


Neurological exam: PRESENT: alert, awake, oriented to person, oriented to place

, oriented to situation


Psychiatric exam: PRESENT: agitated


Skin exam: PRESENT: dry, warm





Results


Laboratory Results: 


 





 12/08/18 08:17 





 12/14/18 14:38 





 











  12/14/18 12/14/18 12/14/18





  02:13 07:40 08:20


 


Sodium  127.2 L   130.1 L


 


Potassium  4.6   4.6


 


Chloride  90 L   96 L


 


Carbon Dioxide  24   20 L


 


Anion Gap  13   14


 


BUN  9   9


 


Creatinine  0.78   0.88


 


Est GFR ( Amer)  > 60   > 60


 


Est GFR (Non-Af Amer)  > 60   > 60


 


Glucose  135 H   138 H


 


Calcium  9.4   9.4


 


Urine Color   YELLOW 


 


Urine Appearance   CLOUDY 


 


Urine pH   7.0 


 


Ur Specific Gravity   1.002 


 


Urine Protein   NEGATIVE 


 


Urine Glucose (UA)   NEGATIVE 


 


Urine Ketones   NEGATIVE 


 


Urine Blood   LARGE H 


 


Urine Nitrite   NEGATIVE 


 


Ur Leukocyte Esterase   LARGE H 


 


Urine WBC (Auto)   51 


 


Urine RBC (Auto)   2 














  12/14/18





  14:38


 


Sodium  131.2 L


 


Potassium  4.3


 


Chloride  92 L


 


Carbon Dioxide  21 L


 


Anion Gap  18


 


BUN  11


 


Creatinine  0.90


 


Est GFR ( Amer)  > 60


 


Est GFR (Non-Af Amer)  > 60


 


Glucose  160 H


 


Calcium  9.7


 


Urine Color 


 


Urine Appearance 


 


Urine pH 


 


Ur Specific Gravity 


 


Urine Protein 


 


Urine Glucose (UA) 


 


Urine Ketones 


 


Urine Blood 


 


Urine Nitrite 


 


Ur Leukocyte Esterase 


 


Urine WBC (Auto) 


 


Urine RBC (Auto) 











Impressions: 


 





Chest X-Ray  11/15/18 00:00


IMPRESSION:  No acute findings


 








Fluoroscopy  11/15/18 00:00


IMPRESSION:  ORIF tibial fracture.  Refer to operative note for further 

information.


 








Pelvis X-Ray  11/15/18 00:00


IMPRESSION:


 


Postsurgical changes with heterotopic bone formation of the


proximal femurs bilaterally. No radiographic evidence for acute


fracture. Correlate with any history of inability to ambulate.


 


 


 2011 [employer]- All Rights Reserved


 








Ankle X-Ray  11/15/18 06:35


IMPRESSION:


 


Displaced oblique fractures of the distal fibular and tibial


diaphyses as described above.


 








Head CT  11/15/18 06:35


IMPRESSION:


 


1. There is no evidence of acute intracranial pathology.


2. Stable enlarged ventricles out of proportion to the sulci


which can be seen with normal pressure hydrocephalus.


3. 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


infarcts.


 








Tibia/Fibula X-Ray  12/08/18 00:00


IMPRESSION:  Healing fractures of the distal tibia and fibular with internal 

fixation of the tibia.


 














Qualifiers





- *


PATIENT BEING DISCHARGED WITH ANY OF THE FOLLOWING DIAGNOSIS: No





Plan


Discharge Plan: 





Pt will be discharged to a skilled facility for chronic care. 


Time Spent: Greater than 30 Minutes

## 2018-12-14 NOTE — PDOC PROGRESS REPORT
Subjective


Progress Note for:: 12/14/18


Subjective:: 





Patient is stable but continues to indicate that he wants to go home.  Does not 

really have any other complaints.


Reason For Visit: 


FX OF RIGHT LEG








Physical Exam


Vital Signs: 


 











Temp Pulse Resp BP Pulse Ox


 


 97.7 F   84   20   113/67   100 


 


 12/14/18 11:09  12/14/18 11:09  12/14/18 11:09  12/14/18 07:57  12/14/18 11:09








 Intake & Output











 12/13/18 12/14/18 12/15/18





 06:59 06:59 06:59


 


Intake Total 4581 644 1906


 


Output Total 1500 1825 2000


 


Balance -538 -1105 -963


 


Weight 61.6 kg 62.1 kg 











Exam: 





General appearance: PRESENT: no acute distress, cooperative, well-developed, 

well-nourished


Head exam: PRESENT: atraumatic, normocephalic


Eye exam: PRESENT: conjunctiva pink, PERRLA.  ABSENT: scleral icterus


Neck exam: ABSENT: JVD


Respiratory exam: PRESENT: Diminished breath sounds.  ABSENT: crackles, rales, 

rhonchi, unlabored, wheezes


Cardiovascular exam: PRESENT: Regular rate rhythm -+S1, +S2.  ABSENT: diastolic 

murmur, systolic murmur


GI/Abdominal exam: PRESENT: normal bowel sounds, soft.  ABSENT: guarding, mass, 

tenderness


Extremities exam: ABSENT: No edema


Neurological exam: PRESENT: alert, awake, oriented to person, place and time.


Skin exam: PRESENT: dry, warm,





Results


Laboratory Results: 


 





 12/08/18 08:17 





 12/14/18 14:38 





 











  12/14/18 12/14/18 12/14/18





  02:13 07:40 08:20


 


Sodium  127.2 L   130.1 L


 


Potassium  4.6   4.6


 


Chloride  90 L   96 L


 


Carbon Dioxide  24   20 L


 


Anion Gap  13   14


 


BUN  9   9


 


Creatinine  0.78   0.88


 


Est GFR ( Amer)  > 60   > 60


 


Est GFR (Non-Af Amer)  > 60   > 60


 


Glucose  135 H   138 H


 


Calcium  9.4   9.4


 


Urine Color   YELLOW 


 


Urine Appearance   CLOUDY 


 


Urine pH   7.0 


 


Ur Specific Gravity   1.002 


 


Urine Protein   NEGATIVE 


 


Urine Glucose (UA)   NEGATIVE 


 


Urine Ketones   NEGATIVE 


 


Urine Blood   LARGE H 


 


Urine Nitrite   NEGATIVE 


 


Ur Leukocyte Esterase   LARGE H 


 


Urine WBC (Auto)   51 


 


Urine RBC (Auto)   2 














  12/14/18





  14:38


 


Sodium  131.2 L


 


Potassium  4.3


 


Chloride  92 L


 


Carbon Dioxide  21 L


 


Anion Gap  18


 


BUN  11


 


Creatinine  0.90


 


Est GFR ( Amer)  > 60


 


Est GFR (Non-Af Amer)  > 60


 


Glucose  160 H


 


Calcium  9.7


 


Urine Color 


 


Urine Appearance 


 


Urine pH 


 


Ur Specific Gravity 


 


Urine Protein 


 


Urine Glucose (UA) 


 


Urine Ketones 


 


Urine Blood 


 


Urine Nitrite 


 


Ur Leukocyte Esterase 


 


Urine WBC (Auto) 


 


Urine RBC (Auto) 











Impressions: 


 





Chest X-Ray  11/15/18 00:00


IMPRESSION:  No acute findings


 








Fluoroscopy  11/15/18 00:00


IMPRESSION:  ORIF tibial fracture.  Refer to operative note for further 

information.


 








Pelvis X-Ray  11/15/18 00:00


IMPRESSION:


 


Postsurgical changes with heterotopic bone formation of the


proximal femurs bilaterally. No radiographic evidence for acute


fracture. Correlate with any history of inability to ambulate.


 


 


 2011 "[employer]"- All Rights Reserved


 








Ankle X-Ray  11/15/18 06:35


IMPRESSION:


 


Displaced oblique fractures of the distal fibular and tibial


diaphyses as described above.


 








Head CT  11/15/18 06:35


IMPRESSION:


 


1. There is no evidence of acute intracranial pathology.


2. Stable enlarged ventricles out of proportion to the sulci


which can be seen with normal pressure hydrocephalus.


3. 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


infarcts.


 








Tibia/Fibula X-Ray  12/08/18 00:00


IMPRESSION:  Healing fractures of the distal tibia and fibular with internal 

fixation of the tibia.


 














Assessment & Plan





- Diagnosis


(1) Hyponatremia


Is this a current diagnosis for this admission?: Yes   


Plan: 


Euvolemic.  Due to SIADH and exacerbated by his antipsychotic medications.  He 

showed some response with tolvaptan yesterday.  His sodium level is improved.  

I will give another dose of tolvaptan 50 mg x1 dose today.  If the sodium level 

continues to improve, I do not see any reason why the patient cannot be 

discharged home by tomorrow.








(2) Hydrocephalus


Is this a current diagnosis for this admission?: Yes   





(3) Neurogenic bladder


Is this a current diagnosis for this admission?: Yes   





(4) Bipolar 1 disorder


Is this a current diagnosis for this admission?: Yes   





(5) Hypothyroid


Is this a current diagnosis for this admission?: Yes   





- Time


Time with patient: 15-25 minutes

## 2018-12-15 RX ADMIN — FLUOXETINE SCH MG: 20 CAPSULE ORAL at 11:00

## 2018-12-15 RX ADMIN — ASPRIN AND EXTENDED-RELEASE DIPYRIDAMOLE SCH CAP.SR: 25; 200 CAPSULE ORAL at 17:18

## 2018-12-15 RX ADMIN — SODIUM CHLORIDE TAB 1 GM SCH GM: 1 TAB at 11:00

## 2018-12-15 RX ADMIN — BUSPIRONE HYDROCHLORIDE SCH MG: 10 TABLET ORAL at 11:00

## 2018-12-15 RX ADMIN — CYCLOBENZAPRINE HYDROCHLORIDE PRN MG: 10 TABLET, FILM COATED ORAL at 21:27

## 2018-12-15 RX ADMIN — INSULIN GLARGINE SCH UNITS: 100 INJECTION, SOLUTION SUBCUTANEOUS at 21:32

## 2018-12-15 RX ADMIN — ASPRIN AND EXTENDED-RELEASE DIPYRIDAMOLE SCH CAP.SR: 25; 200 CAPSULE ORAL at 11:00

## 2018-12-15 RX ADMIN — OXCARBAZEPINE SCH MG: 150 TABLET, FILM COATED ORAL at 11:00

## 2018-12-15 RX ADMIN — DOCUSATE SODIUM SCH MG: 100 CAPSULE, LIQUID FILLED ORAL at 17:18

## 2018-12-15 RX ADMIN — OXCARBAZEPINE SCH MG: 150 TABLET, FILM COATED ORAL at 21:26

## 2018-12-15 RX ADMIN — DIVALPROEX SODIUM SCH MG: 250 TABLET, FILM COATED, EXTENDED RELEASE ORAL at 11:01

## 2018-12-15 RX ADMIN — OXYCODONE HYDROCHLORIDE PRN MG: 5 TABLET ORAL at 21:27

## 2018-12-15 RX ADMIN — LEVOTHYROXINE SODIUM SCH MG: 25 TABLET ORAL at 06:51

## 2018-12-15 RX ADMIN — DOCUSATE SODIUM SCH MG: 100 CAPSULE, LIQUID FILLED ORAL at 11:00

## 2018-12-15 RX ADMIN — TIOTROPIUM BROMIDE SCH CAP: 18 CAPSULE ORAL; RESPIRATORY (INHALATION) at 10:58

## 2018-12-15 RX ADMIN — LANSOPRAZOLE SCH MG: 30 TABLET, ORALLY DISINTEGRATING, DELAYED RELEASE ORAL at 09:01

## 2018-12-15 RX ADMIN — BUSPIRONE HYDROCHLORIDE SCH MG: 10 TABLET ORAL at 21:26

## 2018-12-15 NOTE — PDOC PROGRESS REPORT
Subjective


Progress Note for:: 12/15/18


Subjective:: 





Patient was a little bit difficult to arouse this morning but then I think he 

was more awake than he was prepared to admit.  He said he had no acute pain but 

that his postoperative knee is still hurting quite a bit.  His appetite is 

fair.  No chest pain or difficulty breathing.  No fevers or chills.  He is 

satisfied with his in and out catheterization planned though sometimes wished 

that he can have a Scott catheter in place at all times because that would be 

easier he states.  He is anxious to get discharged.


Reason For Visit: 


FX OF RIGHT LEG








Physical Exam


Vital Signs: 


 











Temp Pulse Resp BP Pulse Ox


 


 97.8 F   89   16   128/70 H  100 


 


 12/15/18 16:00  12/15/18 16:00  12/15/18 16:00  12/15/18 16:00  12/15/18 16:00








 Intake & Output











 12/14/18 12/15/18 12/16/18





 06:59 06:59 06:59


 


Intake Total 720 2492 


 


Output Total 1829 4573 


 


Balance -1105 -2033 


 


Weight 62.1 kg 63.3 kg 











General appearance: PRESENT: no acute distress.  ABSENT: cooperative


Head exam: PRESENT: atraumatic, normocephalic


Eye exam: ABSENT: conjunctival injection, scleral icterus


Mouth exam: PRESENT: moist


Respiratory exam: PRESENT: clear to auscultation david, unlabored.  ABSENT: rales

, rhonchi, wheezes


Cardiovascular exam: PRESENT: RRR, systolic murmur


Pulses: PRESENT: normal radial pulses


GI/Abdominal exam: PRESENT: normal bowel sounds, soft.  ABSENT: tenderness


Musculoskeletal exam: PRESENT: other - Unchanged from yesterday


Neurological exam: PRESENT: oriented to person, oriented to place, oriented to 

situation


Psychiatric exam: PRESENT: agitated


Skin exam: PRESENT: dry, warm





Results


Laboratory Results: 


 





 12/08/18 08:17 





 12/14/18 14:38 








Impressions: 


 





Chest X-Ray  11/15/18 00:00


IMPRESSION:  No acute findings


 








Fluoroscopy  11/15/18 00:00


IMPRESSION:  ORIF tibial fracture.  Refer to operative note for further 

information.


 








Pelvis X-Ray  11/15/18 00:00


IMPRESSION:


 


Postsurgical changes with heterotopic bone formation of the


proximal femurs bilaterally. No radiographic evidence for acute


fracture. Correlate with any history of inability to ambulate.


 


 


 2011 [employer]- All Rights Reserved


 








Ankle X-Ray  11/15/18 06:35


IMPRESSION:


 


Displaced oblique fractures of the distal fibular and tibial


diaphyses as described above.


 








Head CT  11/15/18 06:35


IMPRESSION:


 


1. There is no evidence of acute intracranial pathology.


2. Stable enlarged ventricles out of proportion to the sulci


which can be seen with normal pressure hydrocephalus.


3. 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


infarcts.


 








Tibia/Fibula X-Ray  12/08/18 00:00


IMPRESSION:  Healing fractures of the distal tibia and fibular with internal 

fixation of the tibia.


 














Assessment & Plan





- Diagnosis


(1) Displaced fracture of tibia


Qualifiers: 


   Encounter type: subsequent encounter   Tibia location: distal physis (incl. 

Salter-Prieto)   Laterality: right 


Is this a current diagnosis for this admission?: Yes   


Plan: 


I spoke with Dr. Sanchez today.  The plan is to follow-up with this patient in 

clinic in 1 month and his office will arrange for that.he is aware of the 

results of the most recent plain films.








(2) Bipolar 1 disorder


Is this a current diagnosis for this admission?: Yes   


Plan: 


Stable, continue current medications








(3) Hyponatremia


Is this a current diagnosis for this admission?: Yes   


Plan: 


SIADH, nephrology has consulted, stable, ready for discharge with close follow-

up outpatient.








(4) Dyslipidemia


Is this a current diagnosis for this admission?: Yes   


Plan: 


Continue statin








(5) Hypertension


Qualifiers: 


   Hypertension type: essential hypertension   Qualified Code(s): I10 - 

Essential (primary) hypertension   


Is this a current diagnosis for this admission?: Yes   


Plan: 


Well-controlled, continue current meds








(6) Spina bifida


Qualifiers: 


   Spinal region: unspecified   Presence of hydrocephalus: unspecified 

hydrocephalus presence   Qualified Code(s): Q05.9 - Spina bifida, unspecified   


Is this a current diagnosis for this admission?: Yes   


Plan: 


Continue muscle relaxers








(7) Type 2 diabetes mellitus


Is this a current diagnosis for this admission?: Yes   


Plan: 


Continue current care








(8) Hypothyroidism


Is this a current diagnosis for this admission?: Yes   


Plan: 


Continue thyroid replacement








(9) Hydrocephalus


Is this a current diagnosis for this admission?: Yes   


Plan: 


he will need outpatient follow-up to be evaluated for  shunt placement, this 

should be arranged prior to discharge if at all possible.








(10) Neurogenic bladder


Is this a current diagnosis for this admission?: Yes   


Plan: 


We will continue with in and out catheterization








- Time


Time Spent with patient: 15-24 minutes


Anticipated discharge: SNF





- Inpatient Certification


Based on my medical assessment, after consideration of the patient's 

comorbidities, presenting symptoms, or acuity I expect that the services needed 

warrant INPATIENT care.: Yes


I certify that my determination is in accordance with my understanding of 

Medicare's requirements for reasonable and necessary INPATIENT services [42 CFR 

412.3e].: Yes


Medical Necessity: Significant Comorbidiites Make Outpatient Treatment Too Risky

## 2018-12-16 RX ADMIN — INSULIN GLARGINE SCH UNITS: 100 INJECTION, SOLUTION SUBCUTANEOUS at 22:35

## 2018-12-16 RX ADMIN — DIVALPROEX SODIUM SCH MG: 250 TABLET, FILM COATED, EXTENDED RELEASE ORAL at 11:44

## 2018-12-16 RX ADMIN — DOCUSATE SODIUM SCH MG: 100 CAPSULE, LIQUID FILLED ORAL at 19:00

## 2018-12-16 RX ADMIN — OXCARBAZEPINE SCH MG: 150 TABLET, FILM COATED ORAL at 11:44

## 2018-12-16 RX ADMIN — BUSPIRONE HYDROCHLORIDE SCH MG: 10 TABLET ORAL at 22:30

## 2018-12-16 RX ADMIN — SODIUM CHLORIDE TAB 1 GM SCH GM: 1 TAB at 11:45

## 2018-12-16 RX ADMIN — CYCLOBENZAPRINE HYDROCHLORIDE PRN MG: 10 TABLET, FILM COATED ORAL at 22:30

## 2018-12-16 RX ADMIN — DOCUSATE SODIUM SCH MG: 100 CAPSULE, LIQUID FILLED ORAL at 11:45

## 2018-12-16 RX ADMIN — FLUOXETINE SCH MG: 20 CAPSULE ORAL at 11:45

## 2018-12-16 RX ADMIN — OXCARBAZEPINE SCH MG: 150 TABLET, FILM COATED ORAL at 22:30

## 2018-12-16 RX ADMIN — BUSPIRONE HYDROCHLORIDE SCH MG: 10 TABLET ORAL at 11:44

## 2018-12-16 NOTE — PDOC PROGRESS REPORT
Subjective


Progress Note for:: 12/16/18


Subjective:: 


No new complaint, still with intermittent.  With his knee.  His appetite is 

fair.  No chest pain or difficulty breathing.  No fevers or chills.  No chest 

pain or shortness of breath or palpitations.





Reason For Visit: 


FX OF RIGHT LEG








Physical Exam


Vital Signs: 


 











Temp Pulse Resp BP Pulse Ox


 


 98.4 F   96   17   117/70   100 


 


 12/16/18 11:22  12/16/18 11:22  12/15/18 23:37  12/16/18 11:22  12/16/18 11:22








 Intake & Output











 12/15/18 12/16/18 12/17/18





 06:59 06:59 06:59


 


Intake Total 2492 2122 225


 


Output Total 4525 320 


 


Balance -2033 1802 225


 


Weight 63.3 kg 63.4 kg 














General appearance: PRESENT: no acute distress.  ABSENT: cooperative


Head exam: PRESENT: atraumatic, normocephalic


Eye exam: ABSENT: conjunctival injection, scleral icterus


Mouth exam: PRESENT: moist


Respiratory exam: PRESENT: clear to auscultation david, unlabored.  ABSENT: rales

, rhonchi, wheezes


Cardiovascular exam: PRESENT: RRR, systolic murmur


Pulses: PRESENT: normal radial pulses


GI/Abdominal exam: PRESENT: normal bowel sounds, soft.  ABSENT: tenderness


Musculoskeletal exam: PRESENT: other - Healing surgery site right lower 

extremity


Neurological exam: PRESENT: oriented to person, oriented to place, oriented to 

situation


Psychiatric exam: PRESENT: agitated


Skin exam: PRESENT: dry, warm








Results


Laboratory Results: 


 





 12/08/18 08:17 





 12/14/18 14:38 








Impressions: 


 





Chest X-Ray  11/15/18 00:00


IMPRESSION:  No acute findings


 








Fluoroscopy  11/15/18 00:00


IMPRESSION:  ORIF tibial fracture.  Refer to operative note for further 

information.


 








Pelvis X-Ray  11/15/18 00:00


IMPRESSION:


 


Postsurgical changes with heterotopic bone formation of the


proximal femurs bilaterally. No radiographic evidence for acute


fracture. Correlate with any history of inability to ambulate.


 


 


 2011 [employer]- All Rights Reserved


 








Ankle X-Ray  11/15/18 06:35


IMPRESSION:


 


Displaced oblique fractures of the distal fibular and tibial


diaphyses as described above.


 








Head CT  11/15/18 06:35


IMPRESSION:


 


1. There is no evidence of acute intracranial pathology.


2. Stable enlarged ventricles out of proportion to the sulci


which can be seen with normal pressure hydrocephalus.


3. 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


infarcts.


 








Tibia/Fibula X-Ray  12/08/18 00:00


IMPRESSION:  Healing fractures of the distal tibia and fibular with internal 

fixation of the tibia.


 














Assessment & Plan





- Plan Summary


Plan Summary: 





1) Displaced fracture of tibia


Qualifiers: 


   Encounter type: subsequent encounter   Tibia location: distal physis (incl. 

Salter-Prieto)   Laterality: right 


Is this a current diagnosis for this admission?: Yes   


Plan: 


Dr. Sanchez to follow-up with this patient in clinic in 1 month and his office 

will arrange for that.  By Dr. Carrillo, he is aware of the results of the most 

recent plain films.








(2) Bipolar 1 disorder


Is this a current diagnosis for this admission?: Yes   


Plan: 


Stable, continue current medications








(3) Hyponatremia


Is this a current diagnosis for this admission?: Yes   


Plan: 


SIADH, nephrology has consulted, stable, ready for discharge with close follow-

up outpatient.








(4) Dyslipidemia


Is this a current diagnosis for this admission?: Yes   


Plan: 


Continue statin








(5) Hypertension


Qualifiers: 


   Hypertension type: essential hypertension   Qualified Code(s): I10 - 

Essential (primary) hypertension   


Is this a current diagnosis for this admission?: Yes   


Plan: 


Well-controlled, continue current meds








(6) Spina bifida


Qualifiers: 


   Spinal region: unspecified   Presence of hydrocephalus: unspecified 

hydrocephalus presence   Qualified Code(s): Q05.9 - Spina bifida, unspecified   


Is this a current diagnosis for this admission?: Yes   


Plan: 


Continue muscle relaxers








(7) Type 2 diabetes mellitus


Is this a current diagnosis for this admission?: Yes   


Plan: 


Continue current care








(8) Hypothyroidism


Is this a current diagnosis for this admission?: Yes   


Plan: 


Continue thyroid replacement








(9) Hydrocephalus


Is this a current diagnosis for this admission?: Yes   


Plan: 


he will need outpatient follow-up to be evaluated for  shunt placement, this 

should be arranged prior to discharge if at all possible.








(10) Neurogenic bladder


Is this a current diagnosis for this admission?: Yes   


Plan: 


We will continue with in and out catheterization

## 2018-12-17 VITALS — DIASTOLIC BLOOD PRESSURE: 94 MMHG | SYSTOLIC BLOOD PRESSURE: 132 MMHG

## 2018-12-17 RX ADMIN — DOCUSATE SODIUM SCH MG: 100 CAPSULE, LIQUID FILLED ORAL at 11:13

## 2018-12-17 RX ADMIN — FLUOXETINE SCH MG: 20 CAPSULE ORAL at 11:13

## 2018-12-17 RX ADMIN — BUSPIRONE HYDROCHLORIDE SCH MG: 10 TABLET ORAL at 11:13

## 2018-12-17 RX ADMIN — PHENAZOPYRIDINE HYDROCHLORIDE SCH MG: 100 TABLET ORAL at 11:13

## 2018-12-17 RX ADMIN — CYCLOBENZAPRINE HYDROCHLORIDE PRN MG: 10 TABLET, FILM COATED ORAL at 11:13

## 2018-12-17 RX ADMIN — OXCARBAZEPINE SCH MG: 150 TABLET, FILM COATED ORAL at 11:12

## 2018-12-17 RX ADMIN — SODIUM CHLORIDE TAB 1 GM SCH GM: 1 TAB at 11:12

## 2018-12-17 RX ADMIN — PHENAZOPYRIDINE HYDROCHLORIDE SCH MG: 100 TABLET ORAL at 08:28

## 2018-12-17 RX ADMIN — DIVALPROEX SODIUM SCH MG: 250 TABLET, FILM COATED, EXTENDED RELEASE ORAL at 11:12

## 2024-12-24 NOTE — EKG REPORT
Code sepsis time 0 called at this time.    SEVERITY:- NORMAL ECG -

SINUS RHYTHM

:

Confirmed by: Meeta Connolly MD 27-Sep-2018 14:39:33